# Patient Record
Sex: MALE | Race: WHITE | Employment: UNEMPLOYED | URBAN - METROPOLITAN AREA
[De-identification: names, ages, dates, MRNs, and addresses within clinical notes are randomized per-mention and may not be internally consistent; named-entity substitution may affect disease eponyms.]

---

## 2017-01-10 RX ORDER — LOSARTAN POTASSIUM 100 MG/1
TABLET ORAL
Qty: 30 TAB | Refills: 11 | Status: SHIPPED | OUTPATIENT
Start: 2017-01-10 | End: 2018-01-02 | Stop reason: SDUPTHER

## 2017-01-18 ENCOUNTER — OFFICE VISIT (OUTPATIENT)
Dept: FAMILY MEDICINE CLINIC | Age: 62
End: 2017-01-18

## 2017-01-18 VITALS
OXYGEN SATURATION: 98 % | HEART RATE: 60 BPM | BODY MASS INDEX: 22.5 KG/M2 | HEIGHT: 66 IN | SYSTOLIC BLOOD PRESSURE: 147 MMHG | WEIGHT: 140 LBS | DIASTOLIC BLOOD PRESSURE: 74 MMHG | TEMPERATURE: 47.1 F

## 2017-01-18 DIAGNOSIS — G44.219 EPISODIC TENSION-TYPE HEADACHE, NOT INTRACTABLE: ICD-10-CM

## 2017-01-18 DIAGNOSIS — M17.0 OSTEOARTHRITIS OF BOTH KNEES, UNSPECIFIED OSTEOARTHRITIS TYPE: Primary | ICD-10-CM

## 2017-01-18 RX ORDER — IBUPROFEN 800 MG/1
800 TABLET ORAL
Qty: 120 TAB | Refills: 0 | Status: SHIPPED | OUTPATIENT
Start: 2017-01-18 | End: 2017-06-28 | Stop reason: SDUPTHER

## 2017-01-18 NOTE — PROGRESS NOTES
I saw and evaluated the patient, performing the key elements of the service. I discussed the findings, assessment and plan with the resident and agree with the resident's findings and plan as documented in the resident's note.

## 2017-01-18 NOTE — PROGRESS NOTES
Yuri Hernandez is an 62 y.o. male  presents for the headache and osteoarthritis. Headache  The pain started 2 weeks ago, comes and goes, mostly on the right side of the head. No photosensitivity, no sensitivity to loud noises, nausea, vomiting, fever. Tried OTC ibuprofen 200mg  with very slight relief. The patient thinks that the pain can be attributed to the loud noises from the neighbors. The young neighbor is lestening to the music very loudly. Chronic osteoarthritis of both knees  Has episodes of the pain in the knees bilaterally, mostly during the cold weather period. This is a chronic problem. He tried Ibuprofen 800 mg a long time ago which significantly helped him. Wants to try it again. Allergies - reviewed: Allergies   Allergen Reactions    Bupropion Other (comments)     Urinary frequency and flatulence.  Chantix [Varenicline] Other (comments)     Trouble sleeping, soreness of mouth    Codeine Itching    Fluticasone Shortness of Breath and Swelling     possibly due to    Nasonex [Mometasone] Other (comments)     swelling of throat and hands possibly due to         Medications - reviewed:   Current Outpatient Prescriptions   Medication Sig    ibuprofen (MOTRIN) 800 mg tablet Take 1 Tab by mouth every six (6) hours as needed for Pain. Indications: HEADACHE DISORDER, PAIN    levothyroxine (SYNTHROID) 100 mcg tablet Take 1 Tab by mouth Daily (before breakfast).  losartan (COZAAR) 100 mg tablet TAKE ONE TABLET BY MOUTH DAILY    PULMICORT FLEXHALER 180 mcg/actuation aepb inhaler INHALE TWO PUFFS BY MOUTH TWICE A DAY    carvedilol (COREG) 3.125 mg tablet TAKE ONE TABLET BY MOUTH TWO TIMES A DAY WITH MEALS    oxyCODONE-acetaminophen (PERCOCET) 5-325 mg per tablet Take 1 Tab by mouth every four (4) hours as needed for Pain. Max Daily Amount: 6 Tabs.     PROAIR HFA 90 mcg/actuation inhaler INHALE TWO PUFFS BY MOUTH EVERY 4 HOURS AS NEEDED FOR WHEEZING    TUDORZA PRESSAIR 400 mcg/actuation inhaler INHALE ONE DOSE BY MOUTH TWICE A DAY    traZODone (DESYREL) 50 mg tablet TAKE ONE TABLET BY MOUTH EVERY NIGHT AT BEDTIME AS NEEDED FOR SLEEP    pravastatin (PRAVACHOL) 40 mg tablet TAKE ONE TABLET BY MOUTH DAILY    cimetidine (TAGAMET) 400 mg tablet Take 200 mg by mouth two (2) times a day.  loratadine (CLARITIN) 10 mg tablet Take 1 tablet by mouth daily.  aspirin delayed-release 81 mg tablet Take 1 Tab by mouth daily.  nitroglycerin (NITROSTAT) 0.4 mg SL tablet 1 Tab by SubLINGual route every five (5) minutes as needed for Chest Pain. No current facility-administered medications for this visit. Past Medical History - reviewed:  Past Medical History   Diagnosis Date    Acute myocardial infarction, unspecified site, episode of care unspecified 2/22/2013    CAD (coronary artery disease)     Chest pain 10/15/2014    COPD (chronic obstructive pulmonary disease) (HCC)     Coronary atherosclerosis of native coronary artery 2/22/2013    Exercise induced bronchospasm 2/22/2013    Hypertension     Hyperthyroidism     Old myocardial infarction 2/22/2013    Tobacco use 5/5/2012         Past Surgical History - reviewed:   Past Surgical History   Procedure Laterality Date    Pr cardiac surg procedure unlist       stents may 2012     Hx cataract removal Left 02/02/2016         Social History - reviewed:  Social History     Social History    Marital status:      Spouse name: N/A    Number of children: N/A    Years of education: N/A     Occupational History    Not on file. Social History Main Topics    Smoking status: Current Every Day Smoker     Packs/day: 1.00     Years: 38.00     Types: Cigarettes    Smokeless tobacco: Never Used      Comment: Tried Chantix and had side effects    Alcohol use No    Drug use: Yes     Special: Marijuana      Comment: OCC.     Sexual activity: Not Currently     Other Topics Concern    Not on file     Social History Narrative         Family History - reviewed:  Family History   Problem Relation Age of Onset    Cancer Mother      lung cancer    Heart Disease Father     Thyroid Disease Brother     Thyroid Disease Maternal Aunt          Immunizations - reviewed:   Immunization History   Administered Date(s) Administered    Influenza Vaccine (Quad) PF 11/16/2015, 10/19/2016    Influenza Vaccine PF 10/28/2014    Pneumococcal Polysaccharide (PPSV-23) 10/12/2015         ROS  Review of Systems : negative unless highlighted  CONSTITUTIONAL: Headaches. fevers. Chills. Anorexia. Weight loss. Weight gain. EYES: diplopia. Blurry vision. Visual changes  ENT: sinus congestion. Rhinorrhea. CARDIOVASCULAR: chest pain. palpitations  RESPIRATORY: dyspnea. Cough. Wheeze. NEURO: numbness. Tingling. Weakness. MUSCULOSKELETAL: arthralgias. Myalgias. Edema. SKIN: rash. Itching. Dryness. Flushing. Physical Exam  Visit Vitals    /74 (BP 1 Location: Left arm, BP Patient Position: Sitting)    Pulse 60    Temp (!) 47.1 °F (8.4 °C) (Oral)    Ht 5' 5.75\" (1.67 m)    Wt 140 lb (63.5 kg)    SpO2 98%    BMI 22.77 kg/m2       General appearance - NAD. Appropriately conversational  Neurological - No focal deficits. Speech normal.   Musculoskeletal - Decreased ROM in both knees, Gait normal.    Skin - normal coloration and normal turgor. No cyanosis, no rash. Assessment/Plan  Headache. Most likely tension like. -Will give the prescription for Ibuprofen 800 mg. If this one is not working will try another NSAID    Osteoarthritis  -Ibuprofen as above        Follow-up Disposition:  Return if symptoms worsen or fail to improve, for follow up . I discussed the aforementioned diagnoses with the patient as well as the plan of care.      Senait Thakkar MD  Family Medicine Resident  PGY 1

## 2017-01-18 NOTE — PATIENT INSTRUCTIONS
Headache: Care Instructions  Your Care Instructions    Headaches have many possible causes. Most headaches aren't a sign of a more serious problem, and they will get better on their own. Home treatment may help you feel better faster. The doctor has checked you carefully, but problems can develop later. If you notice any problems or new symptoms, get medical treatment right away. Follow-up care is a key part of your treatment and safety. Be sure to make and go to all appointments, and call your doctor if you are having problems. It's also a good idea to know your test results and keep a list of the medicines you take. How can you care for yourself at home? · Do not drive if you have taken a prescription pain medicine. · Rest in a quiet, dark room until your headache is gone. Close your eyes and try to relax or go to sleep. Don't watch TV or read. · Put a cold, moist cloth or cold pack on the painful area for 10 to 20 minutes at a time. Put a thin cloth between the cold pack and your skin. · Use a warm, moist towel or a heating pad set on low to relax tight shoulder and neck muscles. · Have someone gently massage your neck and shoulders. · Take pain medicines exactly as directed. ¨ If the doctor gave you a prescription medicine for pain, take it as prescribed. ¨ If you are not taking a prescription pain medicine, ask your doctor if you can take an over-the-counter medicine. · Be careful not to take pain medicine more often than the instructions allow, because you may get worse or more frequent headaches when the medicine wears off. · Do not ignore new symptoms that occur with a headache, such as a fever, weakness or numbness, vision changes, or confusion. These may be signs of a more serious problem. To prevent headaches  · Keep a headache diary so you can figure out what triggers your headaches. Avoiding triggers may help you prevent headaches.  Record when each headache began, how long it lasted, and what the pain was like (throbbing, aching, stabbing, or dull). Write down any other symptoms you had with the headache, such as nausea, flashing lights or dark spots, or sensitivity to bright light or loud noise. Note if the headache occurred near your period. List anything that might have triggered the headache, such as certain foods (chocolate, cheese, wine) or odors, smoke, bright light, stress, or lack of sleep. · Find healthy ways to deal with stress. Headaches are most common during or right after stressful times. Take time to relax before and after you do something that has caused a headache in the past.  · Try to keep your muscles relaxed by keeping good posture. Check your jaw, face, neck, and shoulder muscles for tension, and try relaxing them. When sitting at a desk, change positions often, and stretch for 30 seconds each hour. · Get plenty of sleep and exercise. · Eat regularly and well. Long periods without food can trigger a headache. · Treat yourself to a massage. Some people find that regular massages are very helpful in relieving tension. · Limit caffeine by not drinking too much coffee, tea, or soda. But don't quit caffeine suddenly, because that can also give you headaches. · Reduce eyestrain from computers by blinking frequently and looking away from the computer screen every so often. Make sure you have proper eyewear and that your monitor is set up properly, about an arm's length away. · Seek help if you have depression or anxiety. Your headaches may be linked to these conditions. Treatment can both prevent headaches and help with symptoms of anxiety or depression. When should you call for help? Call 911 anytime you think you may need emergency care. For example, call if:  · You have signs of a stroke. These may include:  ¨ Sudden numbness, paralysis, or weakness in your face, arm, or leg, especially on only one side of your body. ¨ Sudden vision changes.   ¨ Sudden trouble speaking. ¨ Sudden confusion or trouble understanding simple statements. ¨ Sudden problems with walking or balance. ¨ A sudden, severe headache that is different from past headaches. Call your doctor now or seek immediate medical care if:  · You have a new or worse headache. · Your headache gets much worse. Where can you learn more? Go to http://benjamin-irma.info/. Enter M271 in the search box to learn more about \"Headache: Care Instructions. \"  Current as of: February 19, 2016  Content Version: 11.1  © 1537-0535 [employer]. Care instructions adapted under license by [employer] (which disclaims liability or warranty for this information). If you have questions about a medical condition or this instruction, always ask your healthcare professional. [address] any warranty or liability for your use of this information. Knee Arthritis: Care Instructions  Your Care Instructions  Knee arthritis is a breakdown of the cartilage that cushions your knee joint. When the cartilage wears down, your bones rub against each other. This causes pain and stiffness. Knee arthritis tends to get worse with time. Treatment for knee arthritis involves reducing pain, making the leg muscles stronger, and staying at a healthy body weight. The treatment usually does not improve the health of the cartilage, but it can reduce pain and improve how well your knee works. You can take simple measures to protect your knee joints, ease your pain, and help you stay active. Follow-up care is a key part of your treatment and safety. Be sure to make and go to all appointments, and call your doctor if you are having problems. It's also a good idea to know your test results and keep a list of the medicines you take. How can you care for yourself at home? · Know that knee arthritis will cause more pain on some days than on others. · Stay at a healthy weight.  Lose weight if you are overweight. When you stand up, the pressure on your knees from every pound of body weight is multiplied four times. So if you lose 10 pounds, you will reduce the pressure on your knees by 40 pounds. · Talk to your doctor or physical therapist about exercises that will help ease joint pain. ¨ Stretch to help prevent stiffness and to prevent injury before you exercise. You may enjoy gentle forms of yoga to help keep your knee joints and muscles flexible. ¨ Walk instead of jog. ¨ Ride a bike. This makes your thigh muscles stronger and takes pressure off your knee. ¨ Wear well-fitting and comfortable shoes. ¨ Exercise in chest-deep water. This can help you exercise longer with less pain. ¨ Avoid exercises that include squatting or kneeling. They can put a lot of strain on your knees. ¨ Talk to your doctor to make sure that the exercise you do is not making the arthritis worse. · Do not sit for long periods of time. Try to walk once in a while to keep your knee from getting stiff. · Ask your doctor or physical therapist whether shoe inserts may reduce your arthritis pain. · If you can afford it, get new athletic shoes at least every year. This can help reduce the strain on your knees. · Use a device to help you do everyday activities. ¨ A cane or walking stick can help you keep your balance when you walk. Hold the cane or walking stick in the hand opposite the painful knee. ¨ If you feel like you may fall when you walk, try using crutches or a front-wheeled walker. These can prevent falls that could cause more damage to your knee. ¨ A knee brace may help keep your knee stable and prevent pain. ¨ You also can use other things to make life easier, such as a higher toilet seat and handrails in the bathtub or shower. · Take pain medicines exactly as directed. ¨ Do not wait until you are in severe pain. You will get better results if you take it sooner.   ¨ If you are not taking a prescription pain medicine, take an over-the-counter medicine such as acetaminophen (Tylenol), ibuprofen (Advil, Motrin), or naproxen (Aleve). Read and follow all instructions on the label. ¨ Do not take two or more pain medicines at the same time unless the doctor told you to. Many pain medicines have acetaminophen, which is Tylenol. Too much acetaminophen (Tylenol) can be harmful. ¨ Tell your doctor if you take a blood thinner, have diabetes, or have allergies to shellfish. · Ask your doctor if you might benefit from a shot of steroid medicine into your knee. This may provide pain relief for several months. · Many people take the supplements glucosamine and chondroitin for osteoarthritis. Some people feel they help, but the medical research does not show that they work. Talk to your doctor before you take these supplements. When should you call for help? Call your doctor now or seek immediate medical care if:  · You have sudden swelling, warmth, or pain in your knee. · You have knee pain and a fever or rash. · You have such bad pain that you cannot use your knee. Watch closely for changes in your health, and be sure to contact your doctor if you have any problems. Where can you learn more? Go to http://benjamin-irma.info/. Enter H619 in the search box to learn more about \"Knee Arthritis: Care Instructions. \"  Current as of: February 24, 2016  Content Version: 11.1  © 6941-6238 [employer]. Care instructions adapted under license by [employer] (which disclaims liability or warranty for this information). If you have questions about a medical condition or this instruction, always ask your healthcare professional. [place] [address] any warranty or liability for your use of this information.

## 2017-01-18 NOTE — MR AVS SNAPSHOT
Visit Information Date & Time Provider Department Dept. Phone Encounter #  
 1/18/2017 10:10 AM Kyle Bartholomew MD [facility] [phone] [id number] Follow-up Instructions Return if symptoms worsen or fail to improve, for follow up . Upcoming Health Maintenance Date Due Hepatitis C Screening 1955 DTaP/Tdap/Td series (1 - Tdap) 9/16/1977 FOBT Q 1 YEAR AGE 50-75 9/16/2006 ZOSTER VACCINE AGE 60> 9/16/2016 Allergies as of 1/18/2017  Review Complete On: 1/18/2017 By: Leita Fleischer, MD  
  
 Severity Noted Reaction Type Reactions Bupropion  12/28/2015    Other (comments) Urinary frequency and flatulence. Chantix [Varenicline]  03/03/2014    Other (comments) Trouble sleeping, soreness of mouth Codeine  05/05/2012    Itching Fluticasone  10/28/2014    Shortness of Breath, Swelling  
 possibly due to Nasonex [Mometasone]  12/29/2014   Side Effect Other (comments)  
 swelling of throat and hands possibly due to Current Immunizations  Reviewed on 1/18/2017 Name Date Influenza Vaccine (Quad) PF 10/19/2016, 11/16/2015 Influenza Vaccine PF 10/28/2014 Influenza Vaccine Split  Deferred (Patient Refused) Pneumococcal Polysaccharide (PPSV-23) 10/12/2015 Reviewed by Leita Fleischer, MD on 1/18/2017 at 10:14 AM  
You Were Diagnosed With   
  
 Codes Comments Osteoarthritis of both knees, unspecified osteoarthritis type    -  Primary ICD-10-CM: M17.0 ICD-9-CM: 715.96 Episodic tension-type headache, not intractable     ICD-10-CM: E65.935 ICD-9-CM: 339.11 Vitals BP Pulse Temp Height(growth percentile) Weight(growth percentile) SpO2  
 147/74 (BP 1 Location: Left arm, BP Patient Position: Sitting) 60 (!) 47.1 °F (8.4 °C) (Oral) 5' 5.75\" (1.67 m) 140 lb (63.5 kg) 98% BMI Smoking Status 22.77 kg/m2 Current Every Day Smoker Vitals History BMI and BSA Data Body Mass Index Body Surface Area  
 22.77 kg/m 2 1.72 m 2 Preferred Pharmacy Pharmacy Name Phone [facility] [address], [address] [facility] [phone] Your Updated Medication List  
  
   
This list is accurate as of: 1/18/17 10:47 AM.  Always use your most recent med list.  
  
  
  
  
 aspirin delayed-release 81 mg tablet Take 1 Tab by mouth daily. carvedilol 3.125 mg tablet Commonly known as:  COREG  
TAKE ONE TABLET BY MOUTH TWO TIMES A DAY WITH MEALS  
  
 cimetidine 400 mg tablet Commonly known as:  TAGAMET Take 200 mg by mouth two (2) times a day. ibuprofen 800 mg tablet Commonly known as:  MOTRIN Take 1 Tab by mouth every six (6) hours as needed for Pain. Indications: HEADACHE DISORDER, PAIN  
  
 levothyroxine 100 mcg tablet Commonly known as:  SYNTHROID Take 1 Tab by mouth Daily (before breakfast). loratadine 10 mg tablet Commonly known as:  Nav Amy Take 1 tablet by mouth daily. losartan 100 mg tablet Commonly known as:  COZAAR  
TAKE ONE TABLET BY MOUTH DAILY  
  
 nitroglycerin 0.4 mg SL tablet Commonly known as:  NITROSTAT  
1 Tab by SubLINGual route every five (5) minutes as needed for Chest Pain. oxyCODONE-acetaminophen 5-325 mg per tablet Commonly known as:  PERCOCET Take 1 Tab by mouth every four (4) hours as needed for Pain. Max Daily Amount: 6 Tabs. pravastatin 40 mg tablet Commonly known as:  PRAVACHOL  
TAKE ONE TABLET BY MOUTH DAILY PROAIR HFA 90 mcg/actuation inhaler Generic drug:  albuterol INHALE TWO PUFFS BY MOUTH EVERY 4 HOURS AS NEEDED FOR WHEEZING PULMICORT FLEXHALER 180 mcg/actuation Aepb inhaler Generic drug:  budesonide INHALE TWO PUFFS BY MOUTH TWICE A DAY  
  
 traZODone 50 mg tablet Commonly known as:  DESYREL  
TAKE ONE TABLET BY MOUTH EVERY NIGHT AT BEDTIME AS NEEDED FOR SLEEP  
  
 TUDORZA PRESSAIR 400 mcg/actuation inhaler Generic drug:  aclidinium bromide INHALE ONE DOSE BY MOUTH TWICE A DAY Prescriptions Sent to Pharmacy Refills  
 ibuprofen (MOTRIN) 800 mg tablet 0 Sig: Take 1 Tab by mouth every six (6) hours as needed for Pain. Indications: HEADACHE DISORDER, PAIN Class: Normal  
 Pharmacy: [address] [facility] #: [phone] Route: Oral  
  
Follow-up Instructions Return if symptoms worsen or fail to improve, for follow up . Patient Instructions Headache: Care Instructions Your Care Instructions Headaches have many possible causes. Most headaches aren't a sign of a more serious problem, and they will get better on their own. Home treatment may help you feel better faster. The doctor has checked you carefully, but problems can develop later. If you notice any problems or new symptoms, get medical treatment right away. Follow-up care is a key part of your treatment and safety. Be sure to make and go to all appointments, and call your doctor if you are having problems. It's also a good idea to know your test results and keep a list of the medicines you take. How can you care for yourself at home? · Do not drive if you have taken a prescription pain medicine. · Rest in a quiet, dark room until your headache is gone. Close your eyes and try to relax or go to sleep. Don't watch TV or read. · Put a cold, moist cloth or cold pack on the painful area for 10 to 20 minutes at a time. Put a thin cloth between the cold pack and your skin. · Use a warm, moist towel or a heating pad set on low to relax tight shoulder and neck muscles. · Have someone gently massage your neck and shoulders. · Take pain medicines exactly as directed. ¨ If the doctor gave you a prescription medicine for pain, take it as prescribed. ¨ If you are not taking a prescription pain medicine, ask your doctor if you can take an over-the-counter medicine. · Be careful not to take pain medicine more often than the instructions allow, because you may get worse or more frequent headaches when the medicine wears off. · Do not ignore new symptoms that occur with a headache, such as a fever, weakness or numbness, vision changes, or confusion. These may be signs of a more serious problem. To prevent headaches · Keep a headache diary so you can figure out what triggers your headaches. Avoiding triggers may help you prevent headaches. Record when each headache began, how long it lasted, and what the pain was like (throbbing, aching, stabbing, or dull). Write down any other symptoms you had with the headache, such as nausea, flashing lights or dark spots, or sensitivity to bright light or loud noise. Note if the headache occurred near your period. List anything that might have triggered the headache, such as certain foods (chocolate, cheese, wine) or odors, smoke, bright light, stress, or lack of sleep. · Find healthy ways to deal with stress. Headaches are most common during or right after stressful times. Take time to relax before and after you do something that has caused a headache in the past. 
· Try to keep your muscles relaxed by keeping good posture. Check your jaw, face, neck, and shoulder muscles for tension, and try relaxing them. When sitting at a desk, change positions often, and stretch for 30 seconds each hour. · Get plenty of sleep and exercise. · Eat regularly and well. Long periods without food can trigger a headache. · Treat yourself to a massage. Some people find that regular massages are very helpful in relieving tension. · Limit caffeine by not drinking too much coffee, tea, or soda. But don't quit caffeine suddenly, because that can also give you headaches. · Reduce eyestrain from computers by blinking frequently and looking away from the computer screen every so often.  Make sure you have proper eyewear and that your monitor is set up properly, about an arm's length away. · Seek help if you have depression or anxiety. Your headaches may be linked to these conditions. Treatment can both prevent headaches and help with symptoms of anxiety or depression. When should you call for help? Call 911 anytime you think you may need emergency care. For example, call if: 
· You have signs of a stroke. These may include: 
¨ Sudden numbness, paralysis, or weakness in your face, arm, or leg, especially on only one side of your body. ¨ Sudden vision changes. ¨ Sudden trouble speaking. ¨ Sudden confusion or trouble understanding simple statements. ¨ Sudden problems with walking or balance. ¨ A sudden, severe headache that is different from past headaches. Call your doctor now or seek immediate medical care if: 
· You have a new or worse headache. · Your headache gets much worse. Where can you learn more? Go to http://benjaminClauseMatchirma.info/. Enter M271 in the search box to learn more about \"Headache: Care Instructions. \" Current as of: February 19, 2016 Content Version: 11.1 © 7002-5276 [employer]. Care instructions adapted under license by [employer] (which disclaims liability or warranty for this information). If you have questions about a medical condition or this instruction, always ask your healthcare professional. [place] [address] any warranty or liability for your use of this information. Knee Arthritis: Care Instructions Your Care Instructions Knee arthritis is a breakdown of the cartilage that cushions your knee joint. When the cartilage wears down, your bones rub against each other. This causes pain and stiffness. Knee arthritis tends to get worse with time. Treatment for knee arthritis involves reducing pain, making the leg muscles stronger, and staying at a healthy body weight.  The treatment usually does not improve the health of the cartilage, but it can reduce pain and improve how well your knee works. You can take simple measures to protect your knee joints, ease your pain, and help you stay active. Follow-up care is a key part of your treatment and safety. Be sure to make and go to all appointments, and call your doctor if you are having problems. It's also a good idea to know your test results and keep a list of the medicines you take. How can you care for yourself at home? · Know that knee arthritis will cause more pain on some days than on others. · Stay at a healthy weight. Lose weight if you are overweight. When you stand up, the pressure on your knees from every pound of body weight is multiplied four times. So if you lose 10 pounds, you will reduce the pressure on your knees by 40 pounds. · Talk to your doctor or physical therapist about exercises that will help ease joint pain. ¨ Stretch to help prevent stiffness and to prevent injury before you exercise. You may enjoy gentle forms of yoga to help keep your knee joints and muscles flexible. ¨ Walk instead of jog. ¨ Ride a bike. This makes your thigh muscles stronger and takes pressure off your knee. ¨ Wear well-fitting and comfortable shoes. ¨ Exercise in chest-deep water. This can help you exercise longer with less pain. ¨ Avoid exercises that include squatting or kneeling. They can put a lot of strain on your knees. ¨ Talk to your doctor to make sure that the exercise you do is not making the arthritis worse. · Do not sit for long periods of time. Try to walk once in a while to keep your knee from getting stiff. · Ask your doctor or physical therapist whether shoe inserts may reduce your arthritis pain. · If you can afford it, get new athletic shoes at least every year. This can help reduce the strain on your knees. · Use a device to help you do everyday activities. ¨ A cane or walking stick can help you keep your balance when you walk. Hold the cane or walking stick in the hand opposite the painful knee. ¨ If you feel like you may fall when you walk, try using crutches or a front-wheeled walker. These can prevent falls that could cause more damage to your knee. ¨ A knee brace may help keep your knee stable and prevent pain. ¨ You also can use other things to make life easier, such as a higher toilet seat and handrails in the bathtub or shower. · Take pain medicines exactly as directed. ¨ Do not wait until you are in severe pain. You will get better results if you take it sooner. ¨ If you are not taking a prescription pain medicine, take an over-the-counter medicine such as acetaminophen (Tylenol), ibuprofen (Advil, Motrin), or naproxen (Aleve). Read and follow all instructions on the label. ¨ Do not take two or more pain medicines at the same time unless the doctor told you to. Many pain medicines have acetaminophen, which is Tylenol. Too much acetaminophen (Tylenol) can be harmful. ¨ Tell your doctor if you take a blood thinner, have diabetes, or have allergies to shellfish. · Ask your doctor if you might benefit from a shot of steroid medicine into your knee. This may provide pain relief for several months. · Many people take the supplements glucosamine and chondroitin for osteoarthritis. Some people feel they help, but the medical research does not show that they work. Talk to your doctor before you take these supplements. When should you call for help? Call your doctor now or seek immediate medical care if: 
· You have sudden swelling, warmth, or pain in your knee. · You have knee pain and a fever or rash. · You have such bad pain that you cannot use your knee. Watch closely for changes in your health, and be sure to contact your doctor if you have any problems. Where can you learn more? Go to http://benjamin-irma.info/. Enter E168 in the search box to learn more about \"Knee Arthritis: Care Instructions. \" Current as of: February 24, 2016 Content Version: 11.1 © 7026-8580 [employer]. Care instructions adapted under license by [employer] (which disclaims liability or warranty for this information). If you have questions about a medical condition or this instruction, always ask your healthcare professional. [address] any warranty or liability for your use of this information. Introducing [facility]! [employer] introduces Gotcha Ninjas patient portal. Now you can access parts of your medical record, email your doctor's office, and request medication refills online. 1. In your internet browser, go to [email or web address] 2. Click on the First Time User? Click Here link in the Sign In box. You will see the New Member Sign Up page. 3. Enter your Gotcha Ninjas Access Code exactly as it appears below. You will not need to use this code after youve completed the sign-up process. If you do not sign up before the expiration date, you must request a new code. · Got[employer] Ninjas Access Code: [email or web address] Expires: 4/18/2017 10:43 AM 
 
4. Enter the last four digits of your Social Security Number (xxxx) and Date of Birth (mm/dd/yyyy) as indicated and click Submit. You will be taken to the next sign-up page. 5. Create a Gotcha Ninjas ID. This will be your Gotcha Ninjas login ID and cannot be changed, so think of one that is secure and easy to remember. 6. Create a Gotcha Ninjas password. You can change your password at any time. 7. Enter your Password Reset Question and Answer. This can be used at a later time if you forget your password. 8. Enter your e-mail address. You will receive e-mail notification when new information is available in [address] 9. Click Sign Up. You can now view and download portions of your medical record. 10. Click the Download Summary menu link to download a portable copy of your medical information. If you have questions, please visit the Frequently Asked Questions section of the [employer] website. Remember, [employer] is NOT to be used for urgent needs. For medical emergencies, dial 911. Now available from your iPhone and Android! Please provide this summary of care documentation to your next provider. Your primary care clinician is listed as Soledad Brenner. If you have any questions after today's visit, please call [phone].

## 2017-01-18 NOTE — PROGRESS NOTES
Coordination of Care  1. Have you been to the ER, urgent care clinic since your last visit? Hospitalized since your last visit? No    2. Have you seen or consulted any other health care providers outside of the [address] [place] [address] since your last visit? Include any pap smears or colon screening. No    Medications  Medication Reconciliation Performed: no  Patient does not need refills     Learning Assessment Complete?  yes

## 2017-03-08 RX ORDER — PRAVASTATIN SODIUM 40 MG/1
TABLET ORAL
Qty: 30 TAB | Refills: 10 | Status: SHIPPED | OUTPATIENT
Start: 2017-03-08 | End: 2018-01-29 | Stop reason: SDUPTHER

## 2017-03-19 ENCOUNTER — HOSPITAL ENCOUNTER (EMERGENCY)
Age: 62
Discharge: HOME | End: 2017-03-20
Attending: EMERGENCY MEDICINE
Payer: MEDICAID

## 2017-03-19 ENCOUNTER — APPOINTMENT (OUTPATIENT)
Dept: GENERAL RADIOLOGY | Age: 62
End: 2017-03-19
Attending: EMERGENCY MEDICINE
Payer: MEDICAID

## 2017-03-19 DIAGNOSIS — J44.9 CHRONIC OBSTRUCTIVE PULMONARY DISEASE, UNSPECIFIED COPD TYPE (HCC): Primary | ICD-10-CM

## 2017-03-19 LAB
ALBUMIN SERPL BCP-MCNC: 3.6 G/DL (ref 3.5–5)
ALBUMIN/GLOB SERPL: 0.9 {RATIO} (ref 1.1–2.2)
ALP SERPL-CCNC: 83 U/L (ref 45–117)
ALT SERPL-CCNC: 14 U/L (ref 12–78)
ANION GAP BLD CALC-SCNC: 7 MMOL/L (ref 5–15)
AST SERPL W P-5'-P-CCNC: 14 U/L (ref 15–37)
BASOPHILS # BLD AUTO: 0 K/UL (ref 0–0.1)
BASOPHILS # BLD: 0 % (ref 0–1)
BILIRUB SERPL-MCNC: 0.4 MG/DL (ref 0.2–1)
BUN SERPL-MCNC: 7 MG/DL (ref 6–20)
BUN/CREAT SERPL: 8 (ref 12–20)
CALCIUM SERPL-MCNC: 8.9 MG/DL (ref 8.5–10.1)
CHLORIDE SERPL-SCNC: 98 MMOL/L (ref 97–108)
CK SERPL-CCNC: 84 U/L (ref 39–308)
CO2 SERPL-SCNC: 30 MMOL/L (ref 21–32)
CREAT SERPL-MCNC: 0.88 MG/DL (ref 0.7–1.3)
EOSINOPHIL # BLD: 0.2 K/UL (ref 0–0.4)
EOSINOPHIL NFR BLD: 2 % (ref 0–7)
ERYTHROCYTE [DISTWIDTH] IN BLOOD BY AUTOMATED COUNT: 13.3 % (ref 11.5–14.5)
FLUAV AG NPH QL IA: NEGATIVE
FLUBV AG NOSE QL IA: NEGATIVE
GLOBULIN SER CALC-MCNC: 3.8 G/DL (ref 2–4)
GLUCOSE SERPL-MCNC: 95 MG/DL (ref 65–100)
HCT VFR BLD AUTO: 46.9 % (ref 36.6–50.3)
HGB BLD-MCNC: 15.6 G/DL (ref 12.1–17)
LYMPHOCYTES # BLD AUTO: 28 % (ref 12–49)
LYMPHOCYTES # BLD: 2 K/UL (ref 0.8–3.5)
MCH RBC QN AUTO: 31.7 PG (ref 26–34)
MCHC RBC AUTO-ENTMCNC: 33.3 G/DL (ref 30–36.5)
MCV RBC AUTO: 95.3 FL (ref 80–99)
MONOCYTES # BLD: 1.4 K/UL (ref 0–1)
MONOCYTES NFR BLD AUTO: 19 % (ref 5–13)
NEUTS SEG # BLD: 3.7 K/UL (ref 1.8–8)
NEUTS SEG NFR BLD AUTO: 51 % (ref 32–75)
PLATELET # BLD AUTO: 164 K/UL (ref 150–400)
POTASSIUM SERPL-SCNC: 4.5 MMOL/L (ref 3.5–5.1)
PROT SERPL-MCNC: 7.4 G/DL (ref 6.4–8.2)
RBC # BLD AUTO: 4.92 M/UL (ref 4.1–5.7)
SODIUM SERPL-SCNC: 135 MMOL/L (ref 136–145)
TROPONIN I SERPL-MCNC: <0.04 NG/ML
WBC # BLD AUTO: 7.3 K/UL (ref 4.1–11.1)

## 2017-03-19 PROCEDURE — 74011636637 HC RX REV CODE- 636/637: Performed by: EMERGENCY MEDICINE

## 2017-03-19 PROCEDURE — 84484 ASSAY OF TROPONIN QUANT: CPT | Performed by: EMERGENCY MEDICINE

## 2017-03-19 PROCEDURE — 80053 COMPREHEN METABOLIC PANEL: CPT | Performed by: EMERGENCY MEDICINE

## 2017-03-19 PROCEDURE — 36415 COLL VENOUS BLD VENIPUNCTURE: CPT | Performed by: EMERGENCY MEDICINE

## 2017-03-19 PROCEDURE — 85025 COMPLETE CBC W/AUTO DIFF WBC: CPT | Performed by: EMERGENCY MEDICINE

## 2017-03-19 PROCEDURE — 94640 AIRWAY INHALATION TREATMENT: CPT

## 2017-03-19 PROCEDURE — 82550 ASSAY OF CK (CPK): CPT | Performed by: EMERGENCY MEDICINE

## 2017-03-19 PROCEDURE — 71020 XR CHEST PA LAT: CPT

## 2017-03-19 PROCEDURE — 74011250637 HC RX REV CODE- 250/637: Performed by: EMERGENCY MEDICINE

## 2017-03-19 PROCEDURE — 96360 HYDRATION IV INFUSION INIT: CPT

## 2017-03-19 PROCEDURE — 74011250636 HC RX REV CODE- 250/636: Performed by: EMERGENCY MEDICINE

## 2017-03-19 PROCEDURE — 74011000250 HC RX REV CODE- 250: Performed by: EMERGENCY MEDICINE

## 2017-03-19 PROCEDURE — 93005 ELECTROCARDIOGRAM TRACING: CPT

## 2017-03-19 PROCEDURE — 87804 INFLUENZA ASSAY W/OPTIC: CPT

## 2017-03-19 PROCEDURE — 99285 EMERGENCY DEPT VISIT HI MDM: CPT

## 2017-03-19 PROCEDURE — 77030013140 HC MSK NEB VYRM -A

## 2017-03-19 RX ORDER — PREDNISONE 20 MG/1
60 TABLET ORAL
Status: COMPLETED | OUTPATIENT
Start: 2017-03-19 | End: 2017-03-19

## 2017-03-19 RX ORDER — LEVOFLOXACIN 750 MG/1
750 TABLET ORAL ONCE
Status: COMPLETED | OUTPATIENT
Start: 2017-03-19 | End: 2017-03-19

## 2017-03-19 RX ORDER — IPRATROPIUM BROMIDE AND ALBUTEROL SULFATE 2.5; .5 MG/3ML; MG/3ML
3 SOLUTION RESPIRATORY (INHALATION)
Status: COMPLETED | OUTPATIENT
Start: 2017-03-19 | End: 2017-03-19

## 2017-03-19 RX ORDER — ASPIRIN 325 MG
325 TABLET ORAL
Status: COMPLETED | OUTPATIENT
Start: 2017-03-19 | End: 2017-03-19

## 2017-03-19 RX ADMIN — PREDNISONE 60 MG: 20 TABLET ORAL at 23:12

## 2017-03-19 RX ADMIN — IPRATROPIUM BROMIDE AND ALBUTEROL SULFATE 3 ML: .5; 3 SOLUTION RESPIRATORY (INHALATION) at 23:12

## 2017-03-19 RX ADMIN — IPRATROPIUM BROMIDE AND ALBUTEROL SULFATE 3 ML: .5; 3 SOLUTION RESPIRATORY (INHALATION) at 23:50

## 2017-03-19 RX ADMIN — LEVOFLOXACIN 750 MG: 750 TABLET, FILM COATED ORAL at 23:12

## 2017-03-19 RX ADMIN — ASPIRIN 325 MG ORAL TABLET 325 MG: 325 PILL ORAL at 23:12

## 2017-03-19 RX ADMIN — SODIUM CHLORIDE 500 ML: 900 INJECTION, SOLUTION INTRAVENOUS at 23:12

## 2017-03-19 RX ADMIN — IPRATROPIUM BROMIDE AND ALBUTEROL SULFATE 3 ML: .5; 3 SOLUTION RESPIRATORY (INHALATION) at 23:29

## 2017-03-20 VITALS
TEMPERATURE: 98.8 F | OXYGEN SATURATION: 90 % | HEART RATE: 87 BPM | WEIGHT: 139.77 LBS | HEIGHT: 67 IN | SYSTOLIC BLOOD PRESSURE: 112 MMHG | BODY MASS INDEX: 21.94 KG/M2 | DIASTOLIC BLOOD PRESSURE: 67 MMHG | RESPIRATION RATE: 20 BRPM

## 2017-03-20 LAB
ATRIAL RATE: 82 BPM
ATRIAL RATE: 94 BPM
CALCULATED P AXIS, ECG09: 53 DEGREES
CALCULATED P AXIS, ECG09: 61 DEGREES
CALCULATED R AXIS, ECG10: 39 DEGREES
CALCULATED R AXIS, ECG10: 76 DEGREES
CALCULATED T AXIS, ECG11: 37 DEGREES
CALCULATED T AXIS, ECG11: 63 DEGREES
CK MB CFR SERPL CALC: 1.7 % (ref 0–2.5)
CK MB SERPL-MCNC: 1.4 NG/ML (ref 5–25)
CK SERPL-CCNC: 82 U/L (ref 39–308)
DIAGNOSIS, 93000: NORMAL
DIAGNOSIS, 93000: NORMAL
P-R INTERVAL, ECG05: 116 MS
P-R INTERVAL, ECG05: 136 MS
Q-T INTERVAL, ECG07: 358 MS
Q-T INTERVAL, ECG07: 384 MS
QRS DURATION, ECG06: 88 MS
QRS DURATION, ECG06: 94 MS
QTC CALCULATION (BEZET), ECG08: 447 MS
QTC CALCULATION (BEZET), ECG08: 448 MS
TROPONIN I SERPL-MCNC: <0.04 NG/ML
VENTRICULAR RATE, ECG03: 82 BPM
VENTRICULAR RATE, ECG03: 94 BPM

## 2017-03-20 PROCEDURE — 82550 ASSAY OF CK (CPK): CPT | Performed by: EMERGENCY MEDICINE

## 2017-03-20 PROCEDURE — 84484 ASSAY OF TROPONIN QUANT: CPT | Performed by: EMERGENCY MEDICINE

## 2017-03-20 RX ORDER — NEBULIZER AND COMPRESSOR
1 EACH MISCELLANEOUS EVERY 4 HOURS
Qty: 1 EACH | Refills: 0 | Status: ON HOLD | OUTPATIENT
Start: 2017-03-20 | End: 2018-12-26

## 2017-03-20 RX ORDER — ALBUTEROL SULFATE 1.25 MG/3ML
1.25 SOLUTION RESPIRATORY (INHALATION)
Qty: 25 EACH | Refills: 0 | Status: SHIPPED | OUTPATIENT
Start: 2017-03-20 | End: 2018-05-31 | Stop reason: SDUPTHER

## 2017-03-20 RX ORDER — DOXYCYCLINE HYCLATE 100 MG
100 TABLET ORAL 2 TIMES DAILY
Qty: 20 TAB | Refills: 0 | Status: SHIPPED | OUTPATIENT
Start: 2017-03-20 | End: 2017-03-30

## 2017-03-20 RX ORDER — PREDNISONE 50 MG/1
50 TABLET ORAL DAILY
Qty: 5 TAB | Refills: 0 | Status: SHIPPED | OUTPATIENT
Start: 2017-03-20 | End: 2017-03-25

## 2017-03-20 NOTE — ED PROVIDER NOTES
HPI Comments: 63 yo M w CAD (5 stents last cath 2015), asthma presents with productive cough, wheeze and dyspnea for 2 days as well as muscle aches, vomiting and diarrhea without fever. He has had intermittent chest pain, atypical, a bilateral soreness lasting for several seconds at a time while or after coughing. He is very short of breath, more so than ever before, enough that he cannot use inhaler effectively. He has never been admitted to the hospital for COPD exacerbation in the past.    PMHx: Significant for CAD, COPD, HTN, MI, hyperthyroidism  PSHx: Significant for cardiac cath / stent  Social Hx: +tobacco (1 ppd), -EtOH, +Illicit Drugs (marijuana)    There are no other complaints, changes, or physical findings at this time. The history is provided by the patient. Past Medical History:   Diagnosis Date    Acute myocardial infarction, unspecified site, episode of care unspecified 2/22/2013    CAD (coronary artery disease)     Chest pain 10/15/2014    COPD (chronic obstructive pulmonary disease) (HCC)     Coronary atherosclerosis of native coronary artery 2/22/2013    Exercise induced bronchospasm 2/22/2013    Hypertension     Hyperthyroidism     Old myocardial infarction 2/22/2013    Tobacco use 5/5/2012       Past Surgical History:   Procedure Laterality Date    CARDIAC SURG PROCEDURE UNLIST      stents may 2012     HX CATARACT REMOVAL Left 02/02/2016         Family History:   Problem Relation Age of Onset    Cancer Mother      lung cancer    Heart Disease Father     Thyroid Disease Brother     Thyroid Disease Maternal Aunt        Social History     Social History    Marital status:      Spouse name: N/A    Number of children: N/A    Years of education: N/A     Occupational History    Not on file.      Social History Main Topics    Smoking status: Current Every Day Smoker     Packs/day: 1.00     Years: 38.00     Types: Cigarettes    Smokeless tobacco: Never Used Comment: Tried Chantix and had side effects    Alcohol use No    Drug use: Yes     Special: Marijuana      Comment: OCC.  Sexual activity: Not Currently     Other Topics Concern    Not on file     Social History Narrative         ALLERGIES: Bupropion; Chantix [varenicline]; Codeine; Fluticasone; and Nasonex [mometasone]    Review of Systems   Constitutional: Negative for chills and fever. Eyes: Negative for visual disturbance. Respiratory: Positive for cough, shortness of breath and wheezing. Cardiovascular: Positive for chest pain. Endocrine: Negative for polyuria. Genitourinary: Negative for dysuria. Skin: Negative for wound. Neurological: Negative for syncope. Psychiatric/Behavioral: Negative for suicidal ideas. All other systems reviewed and are negative. Vitals:    03/19/17 2330 03/20/17 0001 03/20/17 0030 03/20/17 0100   BP: 134/80 124/76 132/81 112/67   Pulse:       Resp:       Temp:       SpO2: 98% 96% 94% 90%   Weight:       Height:                Physical Exam   Constitutional: He is oriented to person, place, and time. He appears well-developed and well-nourished. HENT:   Head: Normocephalic and atraumatic. Cardiovascular: Normal rate, regular rhythm, normal heart sounds and intact distal pulses. Exam reveals no gallop. No murmur heard. Pulmonary/Chest: No respiratory distress. He has wheezes. He has no rales. Mild IWOB, tachypnea   Abdominal: Soft. He exhibits no distension. There is no tenderness. Musculoskeletal: He exhibits no edema. Neurological: He is alert and oriented to person, place, and time. Skin: Skin is warm and dry. Psychiatric: He has a normal mood and affect. Nursing note and vitals reviewed. MDM  Number of Diagnoses or Management Options  Chronic obstructive pulmonary disease, unspecified COPD type [facility]):   Diagnosis management comments: 63 yo M w s/s suggesting COPD exacerbation, w productive cough and wheeze.   Xray neg for pneumonia. Stable on room air. Will treat with steroids, nebs, abx per gold crit and reassess. Regarding CP - atypical, ekg no ischemic changes, trop neg x1. Presumed HEART 3 pending delta trop. Will load aspriin    Wells 0. Amount and/or Complexity of Data Reviewed  Clinical lab tests: ordered and reviewed  Tests in the radiology section of CPT®: ordered and reviewed  Review and summarize past medical records: yes  Independent visualization of images, tracings, or specimens: yes    Patient Progress  Patient progress: stable      Procedures   I reviewed our electronic medical record system for any past medical records that were available that may contribute to the patients current condition, the nursing notes and and vital signs from today's visit    Nursing notes will be reviewed as they become available in realtime while the pt is in the ED. April Tan MD    I discussed with the patient the medical risks of prolonged smoking habits and advised the patient of the benefits of the cessation of smoking. April Tan MD      EKG interpretation 1906 NSR, nl Axis, rate 82; , QRS 94, QTc 448; no acute ischemia; April Tan MD      PROGRESS NOTE:  Pt reevaluated. Pt states his sxs are improved at this time. Updated on all final lab and imaging findings. Pt agrees to follow up with cardiology as scheduled on 4/03/2017. Return precautions given. Vital signs stable for discharge. EKG interpretation 2355: NSR, nl Axis, rate 94; , QRS 88, QTc 447; no acute ischemia; April Tan MD    I personally reviewed pt's imaging. Official read by radiology listed below. April Tan MD    5:51 AM  Progress note:  Pt noted to be feeling better, ready for discharge. Discussed lab and imaging findings with pt and/or family. Will follow up as instructed. All questions have been answered, pt voiced understanding and agreement with plan.  If narcotics were prescribed, pt was advised not to drive or operate heavy machinery. If abx were prescribed, pt advised that diarrhea and rash are possible side effects of the medications. Specific return precautions provided as well as instructions to return to the ED should sx worsen at any time. LABORATORY TESTS:  Recent Results (from the past 12 hour(s))   CBC WITH AUTOMATED DIFF    Collection Time: 03/19/17  9:02 PM   Result Value Ref Range    WBC 7.3 4.1 - 11.1 K/uL    RBC 4.92 4.10 - 5.70 M/uL    HGB 15.6 12.1 - 17.0 g/dL    HCT 46.9 36.6 - 50.3 %    MCV 95.3 80.0 - 99.0 FL    MCH 31.7 26.0 - 34.0 PG    MCHC 33.3 30.0 - 36.5 g/dL    RDW 13.3 11.5 - 14.5 %    PLATELET 699 804 - 487 K/uL    NEUTROPHILS 51 32 - 75 %    LYMPHOCYTES 28 12 - 49 %    MONOCYTES 19 (H) 5 - 13 %    EOSINOPHILS 2 0 - 7 %    BASOPHILS 0 0 - 1 %    ABS. NEUTROPHILS 3.7 1.8 - 8.0 K/UL    ABS. LYMPHOCYTES 2.0 0.8 - 3.5 K/UL    ABS. MONOCYTES 1.4 (H) 0.0 - 1.0 K/UL    ABS. EOSINOPHILS 0.2 0.0 - 0.4 K/UL    ABS. BASOPHILS 0.0 0.0 - 0.1 K/UL   METABOLIC PANEL, COMPREHENSIVE    Collection Time: 03/19/17  9:02 PM   Result Value Ref Range    Sodium 135 (L) 136 - 145 mmol/L    Potassium 4.5 3.5 - 5.1 mmol/L    Chloride 98 97 - 108 mmol/L    CO2 30 21 - 32 mmol/L    Anion gap 7 5 - 15 mmol/L    Glucose 95 65 - 100 mg/dL    BUN 7 6 - 20 MG/DL    Creatinine 0.88 0.70 - 1.30 MG/DL    BUN/Creatinine ratio 8 (L) 12 - 20      GFR est AA >60 >60 ml/min/1.73m2    GFR est non-AA >60 >60 ml/min/1.73m2    Calcium 8.9 8.5 - 10.1 MG/DL    Bilirubin, total 0.4 0.2 - 1.0 MG/DL    ALT (SGPT) 14 12 - 78 U/L    AST (SGOT) 14 (L) 15 - 37 U/L    Alk.  phosphatase 83 45 - 117 U/L    Protein, total 7.4 6.4 - 8.2 g/dL    Albumin 3.6 3.5 - 5.0 g/dL    Globulin 3.8 2.0 - 4.0 g/dL    A-G Ratio 0.9 (L) 1.1 - 2.2     CK W/ REFLX CKMB    Collection Time: 03/19/17  9:02 PM   Result Value Ref Range    CK 84 39 - 308 U/L   TROPONIN I    Collection Time: 03/19/17  9:02 PM   Result Value Ref Range    Troponin-I, Qt. <0.04 <0.05 ng/mL   INFLUENZA A & B AG (RAPID TEST)    Collection Time: 03/19/17 11:10 PM   Result Value Ref Range    Influenza A Antigen NEGATIVE  NEG      Influenza B Antigen NEGATIVE  NEG     EKG, 12 LEAD, SUBSEQUENT    Collection Time: 03/19/17 11:55 PM   Result Value Ref Range    Ventricular Rate 94 BPM    Atrial Rate 94 BPM    P-R Interval 116 ms    QRS Duration 88 ms    Q-T Interval 358 ms    QTC Calculation (Bezet) 447 ms    Calculated P Axis 53 degrees    Calculated R Axis 39 degrees    Calculated T Axis 37 degrees    Diagnosis       Normal sinus rhythm  Normal ECG  When compared with ECG of 19-MAR-2017 19:06,  MANUAL COMPARISON REQUIRED, DATA IS UNCONFIRMED     CK W/ CKMB & INDEX    Collection Time: 03/20/17 12:00 AM   Result Value Ref Range    CK 82 39 - 308 U/L    CK - MB 1.4 <3.6 NG/ML    CK-MB Index 1.7 0 - 2.5     TROPONIN I    Collection Time: 03/20/17 12:00 AM   Result Value Ref Range    Troponin-I, Qt. <0.04 <0.05 ng/mL       IMAGING RESULTS:     CXR Results  (Last 48 hours)               03/19/17 2111  XR CHEST PA LAT Final result    Impression:  IMPRESSION: No acute abnormality identified                       Narrative:  EXAM:  XR CHEST PA LAT       INDICATION:   cp       COMPARISON: 2015. FINDINGS: PA and lateral radiographs of the chest demonstrate lungs clear of an   acute process process. There is scarring at the right base. Right coronary stent   is noted in position. . The cardiac and mediastinal contours and pulmonary   vascularity are normal.  The bones and soft tissues are within normal limits.                     MEDICATIONS GIVEN:  Medications   predniSONE (DELTASONE) tablet 60 mg (60 mg Oral Given 3/19/17 2312)   albuterol-ipratropium (DUO-NEB) 2.5 MG-0.5 MG/3 ML (3 mL Nebulization Given 3/19/17 2350)   levoFLOXacin (LEVAQUIN) tablet 750 mg (750 mg Oral Given 3/19/17 2312)   sodium chloride 0.9 % bolus infusion 500 mL (0 mL IntraVENous IV Completed 3/20/17 0012)   aspirin (ASPIRIN) tablet 325 mg (325 mg Oral Given 3/19/17 9745)       IMPRESSION:  1. Chronic obstructive pulmonary disease, unspecified [facility] type [facility]        PLAN:  1. Discharge Medication List as of 3/20/2017  1:01 AM      START taking these medications    Details   predniSONE (DELTASONE) 50 mg tablet Take 1 Tab by mouth daily for 5 days. , Print, Disp-5 Tab, R-0      doxycycline (VIBRA-TABS) 100 mg tablet Take 1 Tab by mouth two (2) times a day for 10 days. , Print, Disp-20 Tab, R-0      inhalational spacing device 1 Each by Does Not Apply route as needed. , Print, Disp-1 Device, R-0      Nebulizer & Compressor machine 1 Each by Does Not Apply route every four (4) hours. , Print, Disp-1 Each, R-0      albuterol (ACCUNEB) 1.25 mg/3 mL nebu Take 3 mL by inhalation every four (4) hours as needed (wheezing). , Print, Disp-25 Each, R-0         CONTINUE these medications which have NOT CHANGED    Details   pravastatin (PRAVACHOL) 40 mg tablet TAKE ONE TABLET BY MOUTH DAILY, Normal, Disp-30 Tab, R-10      ibuprofen (MOTRIN) 800 mg tablet Take 1 Tab by mouth every six (6) hours as needed for Pain. Indications: HEADACHE DISORDER, PAIN, Normal, Disp-120 Tab, R-0      losartan (COZAAR) 100 mg tablet TAKE ONE TABLET BY MOUTH DAILY, Normal, Disp-30 Tab, R-11      PULMICORT FLEXHALER 180 mcg/actuation aepb inhaler INHALE TWO PUFFS BY MOUTH TWICE A DAY, Normal, Disp-1 Inhaler, R-11      carvedilol (COREG) 3.125 mg tablet TAKE ONE TABLET BY MOUTH TWO TIMES A DAY WITH MEALS, Normal, Disp-60 Tab, R-7      levothyroxine (SYNTHROID) 100 mcg tablet Take 1 Tab by mouth Daily (before breakfast). , Normal, Disp-30 Tab, R-11      PROAIR HFA 90 mcg/actuation inhaler INHALE TWO PUFFS BY MOUTH EVERY 4 HOURS AS NEEDED FOR WHEEZING, Normal, Disp-1 Inhaler, R-5      TUDORZA PRESSAIR 400 mcg/actuation inhaler INHALE ONE DOSE BY MOUTH TWICE A DAY, Normal, Disp-1 Inhaler, R-10      traZODone (DESYREL) 50 mg tablet TAKE ONE TABLET BY MOUTH EVERY NIGHT AT BEDTIME AS NEEDED FOR SLEEP, Normal, Disp-30 Tab, R-5      cimetidine (TAGAMET) 400 mg tablet Take 200 mg by mouth two (2) times a day., Historical Med      aspirin delayed-release 81 mg tablet Take 1 Tab by mouth daily. , Print, Disp-30 Tab, R-11      nitroglycerin (NITROSTAT) 0.4 mg SL tablet 1 Tab by SubLINGual route every five (5) minutes as needed for Chest Pain., Print, Disp-1 Bottle, R-11           2. Follow-up Information     Follow up With Details Comments Contact Info    Samreen Obregon MD In 1 week  [address]  [phone]          Return to ED if worse     DISCHARGE NOTE:  1:06 AM  The patient's results have been reviewed with family and/or caregiver. They verbally convey their understanding and agreement of the patient's signs, symptoms, diagnosis, treatment, and prognosis. They additionally agree to follow up as recommended in the discharge instructions or to return to the Emergency Room should the patient's condition change prior to their follow-up appointment. The family and/or caregiver verbally agrees with the care-plan and all of their questions have been answered. The discharge instructions have also been provided to the them along with educational information regarding the patient's diagnosis and a list of reasons why the patient would want to return to the ER prior to their follow-up appointment should their condition change. I personally saw and examined the patient. Pt in NAD, able to speak in full sentences. I have reviewed and agree with the residents findings, including all diagnostic interpretations, and plans as written. I was present during the key portions of separately billed procedures. Aldair Juares MD          This note will not be viewable in [address]

## 2017-03-20 NOTE — DISCHARGE INSTRUCTIONS
COPD Exacerbation Plan: Care Instructions  Your Care Instructions  If you have chronic obstructive pulmonary disease (COPD), your usual shortness of breath could suddenly get worse. You may start coughing more and have more mucus. This flare-up is called a COPD exacerbation (say \"[id number]-[id number]-BAY-[id number]\"). A lung infection or air pollution could set off an exacerbation. Sometimes it can happen after a quick change in temperature or being around chemicals. Work with your doctor to make a plan for dealing with an exacerbation. You can better manage it if you plan ahead. Follow-up care is a key part of your treatment and safety. Be sure to make and go to all appointments, and call your doctor if you are having problems. It's also a good idea to know your test results and keep a list of the medicines you take. How can you care for yourself at home? During an exacerbation  · Do not panic if you start to have one. Quick treatment at home may help you prevent serious breathing problems. If you have a COPD exacerbation plan that you developed with your doctor, follow it. · Take your medicines exactly as your doctor tells you. ¨ Use your inhaler as directed by your doctor. If your symptoms do not get better after you use your medicine, have someone take you to the emergency room. Call an ambulance if necessary. ¨ With inhaled medicines, a spacer or a nebulizer may help you get more medicine to your lungs. Ask your doctor or pharmacist how to use them properly. Practice using the spacer in front of a mirror before you have an exacerbation. This may help you get the medicine into your lungs quickly. ¨ If your doctor has given you steroid pills, take them as directed. ¨ Your doctor may have given you a prescription for antibiotics, which you can fill if you need it. ¨ Talk to your doctor if you have any problems with your medicine. And call your doctor if you have to use your antibiotic or steroid pills.   Preventing an exacerbation  · Do not smoke. This is the most important step you can take to prevent more damage to your lungs and prevent problems. If you already smoke, it is never too late to stop. If you need help quitting, talk to your doctor about stop-smoking programs and medicines. These can increase your chances of quitting for good. · Take your daily medicines as prescribed. · Avoid colds and flu. ¨ Get a pneumococcal vaccine. ¨ Get a flu vaccine each year, as soon as it is available. Ask those you live or work with to do the same, so they will not get the flu and infect you. ¨ Try to stay away from people with colds or the flu. ¨ Wash your hands often. · Avoid secondhand smoke; air pollution; cold, dry air; hot, humid air; and high altitudes. Stay at home with your windows closed when air pollution is bad. · Learn breathing techniques for COPD, such as breathing through pursed lips. These techniques can help you breathe easier during an exacerbation. When should you call for help? Call 911 anytime you think you may need emergency care. For example, call if:  · You have severe trouble breathing. · You have severe chest pain. Call your doctor now or seek immediate medical care if:  · You have new or worse shortness of breath. · You develop new chest pain. · You are coughing more deeply or more often, especially if you notice more mucus or a change in the color of your mucus. · You cough up blood. · You have new or increased swelling in your legs or belly. · You have a fever. Watch closely for changes in your health, and be sure to contact your doctor if:  · You need to use your antibiotic or steroid pills. · Your symptoms are getting worse. Where can you learn more? Go to http://benjamin-irma.info/. Enter S040 in the search box to learn more about \"COPD Exacerbation Plan: Care Instructions. \"  Current as of: July 21, 2016  Content Version: 11.1  © 3806-8990 [employer]. Care instructions adapted under license by [employer] (which disclaims liability or warranty for this information). If you have questions about a medical condition or this instruction, always ask your healthcare professional. [address] any warranty or liability for your use of this information.

## 2017-03-20 NOTE — ED NOTES
Pt discharged with written instructions and prescriptions at this time. Pt verbalizes understanding and all questions were answered. Discharged by Jeannie Maxwell, in stable condition, with sister.

## 2017-03-20 NOTE — ED NOTES
Assumed care of pt at this time. Pt states that Saturday morning he started having difficulty breathing, coughing and NVD. Pt complains of no pain at this time. Assessment done at this time. . Call bell in reach. Sister at bedside. Pt placed on monitor x 2.

## 2017-03-21 ENCOUNTER — PATIENT OUTREACH (OUTPATIENT)
Dept: FAMILY MEDICINE CLINIC | Age: 62
End: 2017-03-21

## 2017-03-21 NOTE — PROGRESS NOTES
NN RINA   Left message. Haroon Marshall returned NN. RRAT score: Low Risk            9       Total Score        3 Relationship with PCP    6 Charlson Comorbidity Score        Criteria that do not apply:    Patient Living Status    Patient Length of Stay > 5    More than 1 Admission in calendar year    Patient Insurance is Medicare, Medicaid or Self Pay      Recent Labs      03/19/17   2102   NA  135*   K  4.5   CL  98   CO2  30   GLU  95   BUN  7   CREA  0.88   CA  8.9   ALB  3.6   SGOT  14*   ALT  14     No components found for: GLPOC  No results for input(s): PH, PCO2, PO2, HCO3, FIO2 in the last 72 hours. No results for input(s): INR in the last 72 hours. No lab exists for component: INREXT    Language spoken:    Allenmaria isabelameliaclementine 46:   Patient was offered the opportunity to discuss advance care planning:  no     Does patient have an Advance Directive:  no   If no, did you provide information on Caring Connections?  no     PCP/Specialist follow up: Patient scheduled to follow up with Fahad Melton MD on 4/19/17and first has an appointment with cardiologist on April 19, 2017. NN reviewed benefits of seeing provider sooner and Eddie refused. .  Reviewed red flags with patient, and patient verbalizes understanding. Patient given an opportunity to ask questions. No other clinical/social/functional needs noted. The patient agrees to contact the PCP office for questions related to their healthcare. The patient expressed thanks, offered no additional questions and ended the call. Medication:   Performed medication reconciliation with patient, and patient verbalizes understanding of administration of home medications. There were no barriers to obtaining medications identified at this time. EDUCATION:  Smoking cessation, second hand smoke, heart disease. Support system:  patient    Discharge Instructions :  Reviewed discharge instructions with patient.   Patient verbalizes understanding of discharge instructions and follow-up care. Red Flags:  Smoking, SOB, bronchitis. Labs Reviewed:  Recent Labs      17   2102   WBC  7.3   HGB  15.6   HCT  46.9   PLT  164         Medical History:     Past Medical History:   Diagnosis Date    Acute myocardial infarction, unspecified site, episode of care unspecified 2013    CAD (coronary artery disease)     Chest pain 10/15/2014    COPD (chronic obstructive pulmonary disease) (HCC)     Coronary atherosclerosis of native coronary artery 2013    Exercise induced bronchospasm 2013    Hypertension     Hyperthyroidism     Old myocardial infarction 2013    Tobacco use 2012           Nurse Navigator(NN) contacted the patient by telephone to perform post ED discharge assessment. Verified  and address with patient as identifiers. Provided introduction to self, and explanation of the Nurses Navigator role.       Diet:   Patient reports: Regular Diet    Activity:    Patient reports: mostly moving around the house

## 2017-04-03 ENCOUNTER — OFFICE VISIT (OUTPATIENT)
Dept: CARDIOLOGY CLINIC | Age: 62
End: 2017-04-03

## 2017-04-03 VITALS
OXYGEN SATURATION: 95 % | SYSTOLIC BLOOD PRESSURE: 118 MMHG | HEART RATE: 70 BPM | RESPIRATION RATE: 16 BRPM | DIASTOLIC BLOOD PRESSURE: 70 MMHG | HEIGHT: 67 IN | BODY MASS INDEX: 22.79 KG/M2 | WEIGHT: 145.19 LBS

## 2017-04-03 DIAGNOSIS — I25.2 OLD MYOCARDIAL INFARCTION: ICD-10-CM

## 2017-04-03 DIAGNOSIS — Z95.5 S/P CORONARY ARTERY STENT PLACEMENT: ICD-10-CM

## 2017-04-03 DIAGNOSIS — J44.9 CHRONIC OBSTRUCTIVE PULMONARY DISEASE, UNSPECIFIED COPD TYPE (HCC): ICD-10-CM

## 2017-04-03 DIAGNOSIS — I10 ESSENTIAL HYPERTENSION: ICD-10-CM

## 2017-04-03 DIAGNOSIS — E03.9 ACQUIRED HYPOTHYROIDISM: ICD-10-CM

## 2017-04-03 DIAGNOSIS — I25.10 ATHEROSCLEROSIS OF NATIVE CORONARY ARTERY OF NATIVE HEART WITHOUT ANGINA PECTORIS: ICD-10-CM

## 2017-04-03 DIAGNOSIS — I25.10 ASHD (ARTERIOSCLEROTIC HEART DISEASE): Primary | ICD-10-CM

## 2017-04-03 DIAGNOSIS — E78.5 DYSLIPIDEMIA: ICD-10-CM

## 2017-04-03 NOTE — LETTER
4/3/2017 2:15 PM 
 
Patient:  Skye Morgan YOB: 1955 Date of Visit: 4/3/2017 Dear Olivia Pena MD 
[address] [facility] [address] [id number] VIA In Basket 
 : Thank you for referring Mr. Grace Humphreys to me for evaluation/treatment. Below are the relevant portions of my assessment and plan of care. If you have questions, please do not hesitate to call me. I look forward to following Mr. Andrei Smith along with you.  
 
 
 
Sincerely, 
 
 
Carli Ramirez MD

## 2017-04-03 NOTE — PROGRESS NOTES
6 month follow up. Patient stares since stop smoking sob has improved. Also states has swelling in ankles.

## 2017-04-03 NOTE — MR AVS SNAPSHOT
Visit Information Date & Time Provider Department Dept. Phone Encounter #  
 4/3/2017  2:15 PM Bud Nguyen, [address] [place] [address] [facility] [id number] Your Appointments 4/3/2017  2:15 PM  
6 MONTH with Bud Nguyen MD  
[facility] [address] [facility]) Appt Note: 4/3/17 - ok to see NP  
 [address] [facility]  
[phone] [address] [facility]  
  
    
 4/19/2017  9:30 AM  
ROUTINE CARE with Solange Fernandez MD  
[facility] [address] [facility]) Appt Note: fu  2  mos [address] [facility] [address] [facility] [address]  
  
   
 [address] [facility] Upcoming Health Maintenance Date Due Hepatitis C Screening 1955 DTaP/Tdap/Td series (1 - Tdap) 9/16/1977 FOBT Q 1 YEAR AGE 50-75 9/16/2006 ZOSTER VACCINE AGE 60> 9/16/2016 Allergies as of 4/3/2017  Review Complete On: 4/3/2017 By: Alejo Webb NP Severity Noted Reaction Type Reactions Bupropion  12/28/2015    Other (comments) Urinary frequency and flatulence. Chantix [Varenicline]  03/03/2014    Other (comments) Trouble sleeping, soreness of mouth Codeine  05/05/2012    Itching Fluticasone  10/28/2014    Shortness of Breath, Swelling  
 possibly due to Nasonex [Mometasone]  12/29/2014   Side Effect Other (comments)  
 swelling of throat and hands possibly due to Current Immunizations  Reviewed on 1/18/2017 Name Date Influenza Vaccine (Quad) PF 10/19/2016, 11/16/2015 Influenza Vaccine PF 10/28/2014 Influenza Vaccine Split  Deferred (Patient Refused) Pneumococcal Polysaccharide (PPSV-23) 10/12/2015 Not reviewed this visit You Were Diagnosed With   
  
 Codes Comments ASHD (arteriosclerotic heart disease)    -  Primary ICD-10-CM: I25.10 ICD-9-CM: 414.00   
 Atherosclerosis of native coronary artery of native heart without angina pectoris     ICD-10-CM: I25.10 ICD-9-CM: 414.01 Essential hypertension     ICD-10-CM: I10 
ICD-9-CM: 401.9 Dyslipidemia     ICD-10-CM: E78.5 ICD-9-CM: 272.4 Old myocardial infarction     ICD-10-CM: I25.2 ICD-9-CM: 521 Acquired hypothyroidism     ICD-10-CM: E03.9 ICD-9-CM: 238. 9 Chronic obstructive pulmonary disease, unspecified COPD type ([facility] [place] [facility]     ICD-10-CM: J44.9 ICD-9-CM: 254 S/P coronary artery stent placement     ICD-10-CM: Z95.5 ICD-9-CM: V45.82 Vitals BP Pulse Resp Height(growth percentile) Weight(growth percentile) SpO2  
 118/70 (BP 1 Location: Right arm, BP Patient Position: Sitting) 70 16 5' 7\" (1.702 m) 145 lb 3 oz (65.9 kg) 95% BMI Smoking Status 22.74 kg/m2 Former Smoker Vitals History BMI and BSA Data Body Mass Index Body Surface Area 22.74 kg/m 2 1.77 m 2 Preferred Pharmacy Pharmacy Name Phone [facility] [address] [place] [address], [address] [place] [address] [phone] Your Updated Medication List  
  
   
This list is accurate as of: 4/3/17  2:11 PM.  Always use your most recent med list.  
  
  
  
  
 aspirin delayed-release 81 mg tablet Take 1 Tab by mouth daily. carvedilol 3.125 mg tablet Commonly known as:  COREG  
TAKE ONE TABLET BY MOUTH TWO TIMES A DAY WITH MEALS  
  
 cimetidine 400 mg tablet Commonly known as:  TAGAMET Take 200 mg by mouth two (2) times a day. ibuprofen 800 mg tablet Commonly known as:  MOTRIN Take 1 Tab by mouth every six (6) hours as needed for Pain. Indications: HEADACHE DISORDER, PAIN  
  
 inhalational spacing device 1 Each by Does Not Apply route as needed. levothyroxine 100 mcg tablet Commonly known as:  SYNTHROID Take 1 Tab by mouth Daily (before breakfast). losartan 100 mg tablet Commonly known as:  COZAAR  
TAKE ONE TABLET BY MOUTH DAILY Nebulizer & Compressor machine 1 Each by Does Not Apply route every four (4) hours. nitroglycerin 0.4 mg SL tablet Commonly known as:  NITROSTAT  
1 Tab by SubLINGual route every five (5) minutes as needed for Chest Pain.  
  
 pravastatin 40 mg tablet Commonly known as:  PRAVACHOL  
TAKE ONE TABLET BY MOUTH DAILY  
  
 * PROAIR HFA 90 mcg/actuation inhaler Generic drug:  albuterol INHALE TWO PUFFS BY MOUTH EVERY 4 HOURS AS NEEDED FOR WHEEZING  
  
 * albuterol 1.25 mg/3 mL Nebu Commonly known as:  Alyse Tappan Take 3 mL by inhalation every four (4) hours as needed (wheezing). PULMICORT FLEXHALER 180 mcg/actuation Aepb inhaler Generic drug:  budesonide INHALE TWO PUFFS BY MOUTH TWICE A DAY  
  
 traZODone 50 mg tablet Commonly known as:  DESYREL  
TAKE ONE TABLET BY MOUTH EVERY NIGHT AT BEDTIME AS NEEDED FOR SLEEP  
  
 TUDORZA PRESSAIR 400 mcg/actuation inhaler Generic drug:  aclidinium bromide INHALE ONE DOSE BY MOUTH TWICE A DAY * Notice: This list has 2 medication(s) that are the same as other medications prescribed for you. Read the directions carefully, and ask your doctor or other care provider to review them with you. We Performed the Following AMB POC EKG ROUTINE W/ 12 LEADS, INTER & REP [41695 CPT(R)] Introducing [facility] SERVICES! [facility] introduces [employer] patient portal. Now you can access parts of your medical record, email your doctor's office, and request medication refills online. 1. In your internet browser, go to [email or web address] 2. Click on the First Time User? Click Here link in the Sign In box. You will see the New Member Sign Up page. 3. Enter your [employer] Access Code exactly as it appears below. You will not need to use this code after youve completed the sign-up process. If you do not sign up before the expiration date, you must request a new code.  
 
· [employer] Access Code: [email or web address] 
 Expires: 4/18/2017 11:43 AM 
 
4. Enter the last four digits of your Social Security Number (xxxx) and Date of Birth (mm/dd/yyyy) as indicated and click Submit. You will be taken to the next sign-up page. 5. Create a [employer] ID. This will be your [employer] login ID and cannot be changed, so think of one that is secure and easy to remember. 6. Create a [employer] password. You can change your password at any time. 7. Enter your Password Reset Question and Answer. This can be used at a later time if you forget your password. 8. Enter your e-mail address. You will receive e-mail notification when new information is available in [address] 9. Click Sign Up. You can now view and download portions of your medical record. 10. Click the Download Summary menu link to download a portable copy of your medical information. If you have questions, please visit the Frequently Asked Questions section of the [employer] website. Remember, [employer] is NOT to be used for urgent needs. For medical emergencies, dial 911. Now available from your iPhone and Android! Please provide this summary of care documentation to your next provider. Your primary care clinician is listed as Marla Lazo. If you have any questions after today's visit, please call [phone].

## 2017-04-03 NOTE — PROGRESS NOTES
Subjective/HPI:     Leeanne Eubanks is a 62 y.o. male is here for f/u appt. The patient denies chest pain/worsening shortness of breath, orthopnea, PND, LE edema, palpitations, syncope, presyncope or fatigue. Recent COPD exacerbation, quit smoking. PCP Provider  Ivett Villar MD  Past Medical History:   Diagnosis Date    Acute myocardial infarction, unspecified site, episode of care unspecified 2/22/2013    CAD (coronary artery disease)     Chest pain 10/15/2014    COPD (chronic obstructive pulmonary disease) ([facility]     Coronary atherosclerosis of native coronary artery 2/22/2013    Exercise induced bronchospasm 2/22/2013    Hypertension     Hyperthyroidism     Old myocardial infarction 2/22/2013    Tobacco use 5/5/2012      Past Surgical History:   Procedure Laterality Date    CARDIAC SURG PROCEDURE UNLIST      stents may 2012     HX CATARACT REMOVAL Left 02/02/2016     Allergies   Allergen Reactions    Bupropion Other (comments)     Urinary frequency and flatulence.  Chantix [Varenicline] Other (comments)     Trouble sleeping, soreness of mouth    Codeine Itching    Fluticasone Shortness of Breath and Swelling     possibly due to    Nasonex [Mometasone] Other (comments)     swelling of throat and hands possibly due to      Family History   Problem Relation Age of Onset    Cancer Mother      lung cancer    Heart Disease Father     Thyroid Disease Brother     Thyroid Disease Maternal Aunt       Current Outpatient Prescriptions   Medication Sig    inhalational spacing device 1 Each by Does Not Apply route as needed.  Nebulizer & Compressor machine 1 Each by Does Not Apply route every four (4) hours.  albuterol (ACCUNEB) 1.25 mg/3 mL nebu Take 3 mL by inhalation every four (4) hours as needed (wheezing).     pravastatin (PRAVACHOL) 40 mg tablet TAKE ONE TABLET BY MOUTH DAILY    ibuprofen (MOTRIN) 800 mg tablet Take 1 Tab by mouth every six (6) hours as needed for Pain. Indications: HEADACHE DISORDER, PAIN    losartan (COZAAR) 100 mg tablet TAKE ONE TABLET BY MOUTH DAILY    PULMICORT FLEXHALER 180 mcg/actuation aepb inhaler INHALE TWO PUFFS BY MOUTH TWICE A DAY    carvedilol (COREG) 3.125 mg tablet TAKE ONE TABLET BY MOUTH TWO TIMES A DAY WITH MEALS    levothyroxine (SYNTHROID) 100 mcg tablet Take 1 Tab by mouth Daily (before breakfast).  PROAIR HFA 90 mcg/actuation inhaler INHALE TWO PUFFS BY MOUTH EVERY 4 HOURS AS NEEDED FOR WHEEZING    TUDORZA PRESSAIR 400 mcg/actuation inhaler INHALE ONE DOSE BY MOUTH TWICE A DAY    traZODone (DESYREL) 50 mg tablet TAKE ONE TABLET BY MOUTH EVERY NIGHT AT BEDTIME AS NEEDED FOR SLEEP    cimetidine (TAGAMET) 400 mg tablet Take 200 mg by mouth two (2) times a day.  aspirin delayed-release 81 mg tablet Take 1 Tab by mouth daily.  nitroglycerin (NITROSTAT) 0.4 mg SL tablet 1 Tab by SubLINGual route every five (5) minutes as needed for Chest Pain. No current facility-administered medications for this visit. Vitals:    04/03/17 1341   Height: 5' 7\" (1.702 m)     Social History     Social History    Marital status:      Spouse name: N/A    Number of children: N/A    Years of education: N/A     Occupational History    Not on file. Social History Main Topics    Smoking status: Current Every Day Smoker     Packs/day: 1.00     Years: 38.00     Types: Cigarettes    Smokeless tobacco: Never Used      Comment: Tried Chantix and had side effects    Alcohol use No    Drug use: Yes     Special: Marijuana      Comment: OCC.  Sexual activity: Not Currently     Other Topics Concern    Not on file     Social History Narrative       I have reviewed the nurses notes, vitals, problem list, allergy list, medical history, family, social history and medications. Review of Symptoms:    General: Pt denies excessive weight gain or loss.  Pt is able to conduct ADL's  HEENT: Denies blurred vision, headaches, epistaxis and difficulty swallowing. Respiratory: Denies worsening shortness of breath, ANGEL, wheezing or stridor. Cardiovascular: Denies precordial pain, palpitations, edema or PND  Gastrointestinal: Denies poor appetite, indigestion, abdominal pain or blood in stool  Urinary: Denies dysuria, pyuria  Musculoskeletal: Denies pain or swelling from muscles or joints  Neurologic: Denies tremor, paresthesias, or sensory motor disturbance  Skin: Denies rash, itching or texture change. Psych: Denies depression        Physical Exam:      General: Well developed, in no acute distress, cooperative and alert  HEENT: No carotid bruits, no JVD, trach is midline. Neck Supple, PEERL, EOM intact. Heart:  Normal S1/S2 negative S3 or S4. Regular, no murmur, gallop or rub.   Respiratory: Restrictive air sounds, Clear bilaterally x 4, no wheezing or rales  Abdomen:   Soft, non-tender, no masses, bowel sounds are active.   Extremities:  No edema, normal cap refill, no cyanosis, atraumatic. Neuro: A&Ox3, speech clear, gait stable. Skin: Skin color is normal. No rashes or lesions. Non diaphoretic  Vascular: 2+ pulses symmetric in all extremities    Cardiographics    ECG: Sinus  Rhythm HR 74  -Old inferior-apical infarct.      Results for orders placed or performed during the hospital encounter of 03/19/17   EKG, 12 LEAD, INITIAL   Result Value Ref Range    Ventricular Rate 82 BPM    Atrial Rate 82 BPM    P-R Interval 136 ms    QRS Duration 94 ms    Q-T Interval 384 ms    QTC Calculation (Bezet) 448 ms    Calculated P Axis 61 degrees    Calculated R Axis 76 degrees    Calculated T Axis 63 degrees    Diagnosis       Normal sinus rhythm  Normal ECG  Confirmed by Marycarmen Ruiz ([id number]) on 3/20/2017 8:10:32 AM           Cardiology Labs:  Lab Results   Component Value Date/Time    Cholesterol, total 102 06/02/2015 04:16 AM    HDL Cholesterol 38 06/02/2015 04:16 AM    LDL, calculated 33.6 06/02/2015 04:16 AM    Triglyceride 152 06/02/2015 04:16 AM    CHOL/HDL Ratio 2.7 06/02/2015 04:16 AM       Lab Results   Component Value Date/Time    Sodium 135 03/19/2017 09:02 PM    Potassium 4.5 03/19/2017 09:02 PM    Chloride 98 03/19/2017 09:02 PM    CO2 30 03/19/2017 09:02 PM    Anion gap 7 03/19/2017 09:02 PM    Glucose 95 03/19/2017 09:02 PM    BUN 7 03/19/2017 09:02 PM    Creatinine 0.88 03/19/2017 09:02 PM    BUN/Creatinine ratio 8 03/19/2017 09:02 PM    GFR est AA >60 03/19/2017 09:02 PM    GFR est non-AA >60 03/19/2017 09:02 PM    Calcium 8.9 03/19/2017 09:02 PM    AST (SGOT) 14 03/19/2017 09:02 PM    Alk. phosphatase 83 03/19/2017 09:02 PM    Protein, total 7.4 03/19/2017 09:02 PM    Albumin 3.6 03/19/2017 09:02 PM    Globulin 3.8 03/19/2017 09:02 PM    A-G Ratio 0.9 03/19/2017 09:02 PM    ALT (SGPT) 14 03/19/2017 09:02 PM           Assessment:     Assessment:     Eddie was seen today for coronary artery disease. Diagnoses and all orders for this visit:    Essential hypertension  -     AMB POC EKG ROUTINE W/ 12 LEADS, INTER & REP        ICD-10-CM ICD-9-CM    1. Essential hypertension I10 401.9 AMB POC EKG ROUTINE W/ 12 LEADS, INTER & REP     Orders Placed This Encounter    AMB POC EKG ROUTINE W/ 12 LEADS, INTER & REP     Order Specific Question:   Reason for Exam:     Answer:   routine        Plan:     Patient presents today for his 6 months f/u appt and denies new/worsening cardiac complaints. He remains in SR and has a normotensive BP. Congratulated on quitting smoking on the 17th of this month. His labs are being drawn by his PCP, we are filling his statin med. Asked that he speak with PCP about further refills going through his office as we are not receiving copies of labs to review. He reiterated understanding and agreed with plan. Continue current care and f/u in 6 months.     Jeannie Cortez NP

## 2017-04-19 ENCOUNTER — OFFICE VISIT (OUTPATIENT)
Dept: FAMILY MEDICINE CLINIC | Age: 62
End: 2017-04-19

## 2017-04-19 VITALS
TEMPERATURE: 97.9 F | OXYGEN SATURATION: 96 % | HEART RATE: 50 BPM | DIASTOLIC BLOOD PRESSURE: 76 MMHG | BODY MASS INDEX: 23.18 KG/M2 | WEIGHT: 148 LBS | SYSTOLIC BLOOD PRESSURE: 157 MMHG

## 2017-04-19 DIAGNOSIS — I10 ESSENTIAL HYPERTENSION: ICD-10-CM

## 2017-04-19 DIAGNOSIS — J44.9 CHRONIC OBSTRUCTIVE PULMONARY DISEASE, UNSPECIFIED COPD TYPE (HCC): Primary | ICD-10-CM

## 2017-04-19 DIAGNOSIS — G47.00 INSOMNIA, UNSPECIFIED TYPE: ICD-10-CM

## 2017-04-19 NOTE — MR AVS SNAPSHOT
Visit Information Date & Time Provider Department Dept. Phone Encounter #  
 4/19/2017  9:30 AM Kristal oCpeland, [address] [facility] [phone] [id number] Follow-up Instructions Return in about 6 weeks (around 5/31/2017). Your Appointments 10/18/2017  9:00 AM  
6 MONTH with David Christine MD  
[facility]) Appt Note: Per Papito Ford. $0CP REM  
 [address] [facility]  
[phone] [address] Upcoming Health Maintenance Date Due Hepatitis C Screening 1955 DTaP/Tdap/Td series (1 - Tdap) 9/16/1977 FOBT Q 1 YEAR AGE 50-75 9/16/2006 ZOSTER VACCINE AGE 60> 9/16/2016 Allergies as of 4/19/2017  Review Complete On: 4/19/2017 By: Kristal Copeland MD  
  
 Severity Noted Reaction Type Reactions Bupropion  12/28/2015    Other (comments) Urinary frequency and flatulence. Chantix [Varenicline]  03/03/2014    Other (comments) Trouble sleeping, soreness of mouth Codeine  05/05/2012    Itching Fluticasone  10/28/2014    Shortness of Breath, Swelling  
 possibly due to Nasonex [Mometasone]  12/29/2014   Side Effect Other (comments)  
 swelling of throat and hands possibly due to Current Immunizations  Reviewed on 1/18/2017 Name Date Influenza Vaccine (Quad) PF 10/19/2016, 11/16/2015 Influenza Vaccine PF 10/28/2014 Influenza Vaccine Split  Deferred (Patient Refused) Pneumococcal Polysaccharide (PPSV-23) 10/12/2015 Not reviewed this visit You Were Diagnosed With   
  
 Codes Comments Chronic obstructive pulmonary disease, unspecified COPD type ([facility].)    -  Primary ICD-10-CM: J44.9 ICD-9-CM: 672 Essential hypertension     ICD-10-CM: I10 
ICD-9-CM: 401.9 Insomnia, unspecified type     ICD-10-CM: G47.00 ICD-9-CM: 780.52 Vitals BP Pulse Temp Weight(growth percentile) SpO2 BMI 157/76 (BP 1 Location: Right arm, BP Patient Position: Sitting) (!) 50 97.9 °F (36.6 °C) (Oral) 148 lb (67.1 kg) 96% 23.18 kg/m2 Smoking Status Former Smoker Vitals History BMI and BSA Data Body Mass Index Body Surface Area  
 23.18 kg/m 2 1.78 m 2 Preferred Pharmacy Pharmacy Name Phone [facility] [address] , [address] [place] [address] [phone] Your Updated Medication List  
  
   
This list is accurate as of: 4/19/17 10:39 AM.  Always use your most recent med list.  
  
  
  
  
 aspirin delayed-release 81 mg tablet Take 1 Tab by mouth daily. carvedilol 3.125 mg tablet Commonly known as:  COREG  
TAKE ONE TABLET BY MOUTH TWO TIMES A DAY WITH MEALS  
  
 cimetidine 400 mg tablet Commonly known as:  TAGAMET Take 200 mg by mouth two (2) times a day. ibuprofen 800 mg tablet Commonly known as:  MOTRIN Take 1 Tab by mouth every six (6) hours as needed for Pain. Indications: HEADACHE DISORDER, PAIN  
  
 inhalational spacing device 1 Each by Does Not Apply route as needed. levothyroxine 100 mcg tablet Commonly known as:  SYNTHROID Take 1 Tab by mouth Daily (before breakfast). losartan 100 mg tablet Commonly known as:  COZAAR  
TAKE ONE TABLET BY MOUTH DAILY Nebulizer & Compressor machine 1 Each by Does Not Apply route every four (4) hours. nitroglycerin 0.4 mg SL tablet Commonly known as:  NITROSTAT  
1 Tab by SubLINGual route every five (5) minutes as needed for Chest Pain.  
  
 pravastatin 40 mg tablet Commonly known as:  PRAVACHOL  
TAKE ONE TABLET BY MOUTH DAILY  
  
 * PROAIR HFA 90 mcg/actuation inhaler Generic drug:  albuterol INHALE TWO PUFFS BY MOUTH EVERY 4 HOURS AS NEEDED FOR WHEEZING  
  
 * albuterol 1.25 mg/3 mL Nebu Commonly known as:  Susi Jeferson Take 3 mL by inhalation every four (4) hours as needed (wheezing). PULMICORT FLEXHALER 180 mcg/actuation Aepb inhaler Generic drug:  budesonide INHALE TWO PUFFS BY MOUTH TWICE A DAY  
  
 traZODone 50 mg tablet Commonly known as:  DESYREL  
TAKE ONE TABLET BY MOUTH EVERY NIGHT AT BEDTIME AS NEEDED FOR SLEEP  
  
 TUDORZA PRESSAIR 400 mcg/actuation inhaler Generic drug:  aclidinium bromide INHALE ONE DOSE BY MOUTH TWICE A DAY * Notice: This list has 2 medication(s) that are the same as other medications prescribed for you. Read the directions carefully, and ask your doctor or other care provider to review them with you. Follow-up Instructions Return in about 6 weeks (around 5/31/2017). Patient Instructions Insomnia: Care Instructions Your Care Instructions Insomnia is the inability to sleep well. It is a common problem for most people at some time. Insomnia may make it hard for you to get to sleep, stay asleep, or sleep as long as you need to. This can make you tired and grouchy during the day. It can also make you forgetful, less effective at work, and unhappy. Insomnia can be caused by conditions such as depression or anxiety. Pain can also affect your ability to sleep. When these problems are solved, the insomnia usually clears up. But sometimes bad sleep habits can cause insomnia. If insomnia is affecting your work or your enjoyment of life, you can take steps to improve your sleep. Follow-up care is a key part of your treatment and safety. Be sure to make and go to all appointments, and call your doctor if you are having problems. It's also a good idea to know your test results and keep a list of the medicines you take. How can you care for yourself at home? What to avoid · Do not have drinks with caffeine, such as coffee or black tea, for 8 hours before bed. · Do not smoke or use other types of tobacco near bedtime. Nicotine is a stimulant and can keep you awake. · Avoid drinking alcohol late in the evening, because it can cause you to wake in the middle of the night. · Do not eat a big meal close to bedtime. If you are hungry, eat a light snack. · Do not drink a lot of water close to bedtime, because the need to urinate may wake you up during the night. · Do not read or watch TV in bed. Use the bed only for sleeping and sexual activity. What to try · Go to bed at the same time every night, and wake up at the same time every morning. Do not take naps during the day. · Keep your bedroom quiet, dark, and cool. · Sleep on a comfortable pillow and mattress. · If watching the clock makes you anxious, turn it facing away from you so you cannot see the time. · If you worry when you lie down, start a worry book. Well before bedtime, write down your worries, and then set the book and your concerns aside. · Try meditation or other relaxation techniques before you go to bed. · If you cannot fall asleep, get up and go to another room until you feel sleepy. Do something relaxing. Repeat your bedtime routine before you go to bed again. · Make your house quiet and calm about an hour before bedtime. Turn down the lights, turn off the TV, log off the computer, and turn down the volume on music. This can help you relax after a busy day. When should you call for help? Watch closely for changes in your health, and be sure to contact your doctor if: 
· Your efforts to improve your sleep do not work. · Your insomnia gets worse. · You have been feeling down, depressed, or hopeless or have lost interest in things that you usually enjoy. Where can you learn more? Go to http://benjamin-irma.info/. Enter P513 in the search box to learn more about \"Insomnia: Care Instructions. \" Current as of: July 26, 2016 Content Version: 11.2 © 1931-6116 [employer].  Care instructions adapted under license by [address] (which disclaims liability or warranty for this information). If you have questions about a medical condition or this instruction, always ask your healthcare professional. [address] any warranty or liability for your use of this information. Introducing [facility]! Kelin Ricci introduces CromoUp patient portal. Now you can access parts of your medical record, email your doctor's office, and request medication refills online. 1. In your internet browser, go to [email or web address] 2. Click on the First Time User? Click Here link in the Sign In box. You will see the New Member Sign Up page. 3. Enter your CromoUp Access Code exactly as it appears below. You will not need to use this code after youve completed the sign-up process. If you do not sign up before the expiration date, you must request a new code. · [employer]oUp Access Code: [email or web address] Expires: 7/18/2017 10:39 AM 
 
4. Enter the last four digits of your Social Security Number (xxxx) and Date of Birth (mm/dd/yyyy) as indicated and click Submit. You will be taken to the next sign-up page. 5. Create a CromoUp ID. This will be your [employer]oUp login ID and cannot be changed, so think of one that is secure and easy to remember. 6. Create a CromoUp password. You can change your password at any time. 7. Enter your Password Reset Question and Answer. This can be used at a later time if you forget your password. 8. Enter your e-mail address. You will receive e-mail notification when new information is available in [address] 9. Click Sign Up. You can now view and download portions of your medical record. 10. Click the Download Summary menu link to download a portable copy of your medical information. If you have questions, please visit the Frequently Asked Questions section of the [employer]oUp website.  Remember, CromoUp is NOT to be used for urgent needs. For medical emergencies, dial 911. Now available from your iPhone and Android! Please provide this summary of care documentation to your next provider. Your primary care clinician is listed as Jacklyn Jaquez. If you have any questions after today's visit, please call [phone].

## 2017-04-19 NOTE — PATIENT INSTRUCTIONS

## 2017-04-19 NOTE — PROGRESS NOTES
Subjective:     Fito Auguste is a 62 y.o. male who presents for follow up of hypertension, COPD, and CAD. He was seen  at the ED for exacerbation of COPD on 3/19. After this he decided to stop smoking. Has been smoke free since 3/19! Patient Active Problem List   Diagnosis Code    STEMI (ST elevation myocardial infarction) ([facility] I21.3    Tobacco use Z72.0    S/P coronary artery stent placement Z95.5    Dyslipidemia E78.5    Coronary atherosclerosis of native coronary artery I25.10    Exercise induced bronchospasm J45.990    Old myocardial infarction I25.2    [facility] obstructive [facility]) [facility] J44.9    Insomnia G47.00    Unstable angina pectoris (HCC) I20.0    Essential hypertension I10    ASHD (arteriosclerotic heart disease) I25.10    Cigarette smoker F17.210    Acquired hypothyroidism E03.9       Current Outpatient Prescriptions   Medication Sig    inhalational spacing device 1 Each by Does Not Apply route as needed.  Nebulizer & Compressor machine 1 Each by Does Not Apply route every four (4) hours.  albuterol (ACCUNEB) 1.25 mg/3 mL nebu Take 3 mL by inhalation every four (4) hours as needed (wheezing).  pravastatin (PRAVACHOL) 40 mg tablet TAKE ONE TABLET BY MOUTH DAILY    ibuprofen (MOTRIN) 800 mg tablet Take 1 Tab by mouth every six (6) hours as needed for Pain. Indications: HEADACHE DISORDER, PAIN    losartan (COZAAR) 100 mg tablet TAKE ONE TABLET BY MOUTH DAILY    PULMICORT FLEXHALER 180 mcg/actuation aepb inhaler INHALE TWO PUFFS BY MOUTH TWICE A DAY    carvedilol (COREG) 3.125 mg tablet TAKE ONE TABLET BY MOUTH TWO TIMES A DAY WITH MEALS    levothyroxine (SYNTHROID) 100 mcg tablet Take 1 Tab by mouth Daily (before breakfast).     TUDORZA PRESSAIR 400 mcg/actuation inhaler INHALE ONE DOSE BY MOUTH TWICE A DAY    traZODone (DESYREL) 50 mg tablet TAKE ONE TABLET BY MOUTH EVERY NIGHT AT BEDTIME AS NEEDED FOR SLEEP    aspirin delayed-release 81 mg tablet Take 1 Tab by mouth daily.  PROAIR HFA 90 mcg/actuation inhaler INHALE TWO PUFFS BY MOUTH EVERY 4 HOURS AS NEEDED FOR WHEEZING    cimetidine (TAGAMET) 400 mg tablet Take 200 mg by mouth two (2) times a day.  nitroglycerin (NITROSTAT) 0.4 mg SL tablet 1 Tab by SubLINGual route every five (5) minutes as needed for Chest Pain. No current facility-administered medications for this visit. Allergies   Allergen Reactions    Bupropion Other (comments)     Urinary frequency and flatulence.  Chantix [Varenicline] Other (comments)     Trouble sleeping, soreness of mouth    Codeine Itching    Fluticasone Shortness of Breath and Swelling     possibly due to    Nasonex [Mometasone] Other (comments)     swelling of throat and hands possibly due to       Diet and Lifestyle: not attempting to follow a low sodium diet      BP readings outside office:  Not checking but has BP cuff at home. Cardiovascular ROS: taking medications as instructed, no medication side effects noted, no TIA's, noting some chest pains described as brief and without pattern, less dyspnea on exertion, no swelling of ankles. Using less rescue inhaler. Review of Systems, additional:  Pertinent items are noted in HPI.       Past Medical History:   Diagnosis Date    Acute myocardial infarction, unspecified site, episode of care unspecified 2/22/2013    CAD (coronary artery disease)     Chest pain 10/15/2014    COPD (chronic obstructive pulmonary disease) (HCC)     Coronary atherosclerosis of native coronary artery 2/22/2013    Exercise induced bronchospasm 2/22/2013    Hypertension     Hyperthyroidism     Old myocardial infarction 2/22/2013    Tobacco use 5/5/2012     Past Surgical History:   Procedure Laterality Date    CARDIAC SURG PROCEDURE UNLIST      stents may 2012     HX CATARACT REMOVAL Left 02/02/2016     Family History   Problem Relation Age of Onset    Cancer Mother      lung cancer    Heart Disease Father     Thyroid Disease Brother     Thyroid Disease Maternal Aunt      Social History   Substance Use Topics    Smoking status: Former Smoker     Packs/day: 1.00     Years: 38.00     Types: Cigarettes     Quit date: 3/17/2017    Smokeless tobacco: Never Used      Comment: Tried Chantix and had side effects. Quit 3/2017.  Alcohol use No          Objective:     Vitals:    04/19/17 0920 04/19/17 0929 04/19/17 1033 04/19/17 1034   BP: 155/82 160/89 150/86 157/76   Pulse: (!) 46 (!) 50 62 (!) 50   Temp: 97.9 °F (36.6 °C)      TempSrc: Oral      SpO2: 96%      Weight: 148 lb (67.1 kg)          Physical Exam  General appearance: alert, cooperative, no distress, appears stated age    Lungs: clear to auscultation bilaterally, no wheezes, no increased work of breathing  Heart: regular rate and rhythm, S1, S2 normal, no murmur, click, rub or gallop  Extremities: extremities normal, no cyanosis or edema  Skin: color, texture, turgor normal. No rashes or lesions  Neurologic: Alert and oriented, grossly normal exam. Normal coordination and gait      Lab results below reviewed at this visit:  Results for orders placed or performed during the hospital encounter of 03/19/17   INFLUENZA A & B AG (RAPID TEST)   Result Value Ref Range    Influenza A Antigen NEGATIVE  NEG      Influenza B Antigen NEGATIVE  NEG     CBC WITH AUTOMATED DIFF   Result Value Ref Range    WBC 7.3 4.1 - 11.1 K/uL    RBC 4.92 4.10 - 5.70 M/uL    HGB 15.6 12.1 - 17.0 g/dL    HCT 46.9 36.6 - 50.3 %    MCV 95.3 80.0 - 99.0 FL    MCH 31.7 26.0 - 34.0 PG    MCHC 33.3 30.0 - 36.5 g/dL    RDW 13.3 11.5 - 14.5 %    PLATELET 924 901 - 311 K/uL    NEUTROPHILS 51 32 - 75 %    LYMPHOCYTES 28 12 - 49 %    MONOCYTES 19 (H) 5 - 13 %    EOSINOPHILS 2 0 - 7 %    BASOPHILS 0 0 - 1 %    ABS. NEUTROPHILS 3.7 1.8 - 8.0 K/UL    ABS. LYMPHOCYTES 2.0 0.8 - 3.5 K/UL    ABS. MONOCYTES 1.4 (H) 0.0 - 1.0 K/UL    ABS. EOSINOPHILS 0.2 0.0 - 0.4 K/UL    ABS.  BASOPHILS 0.0 0.0 - 0.1 K/UL   METABOLIC PANEL, COMPREHENSIVE   Result Value Ref Range    Sodium 135 (L) 136 - 145 mmol/L    Potassium 4.5 3.5 - 5.1 mmol/L    Chloride 98 97 - 108 mmol/L    CO2 30 21 - 32 mmol/L    Anion gap 7 5 - 15 mmol/L    Glucose 95 65 - 100 mg/dL    BUN 7 6 - 20 MG/DL    Creatinine 0.88 0.70 - 1.30 MG/DL    BUN/Creatinine ratio 8 (L) 12 - 20      GFR est AA >60 >60 ml/min/1.73m2    GFR est non-AA >60 >60 ml/min/1.73m2    Calcium 8.9 8.5 - 10.1 MG/DL    Bilirubin, total 0.4 0.2 - 1.0 MG/DL    ALT (SGPT) 14 12 - 78 U/L    AST (SGOT) 14 (L) 15 - 37 U/L    Alk.  phosphatase 83 45 - 117 U/L    Protein, total 7.4 6.4 - 8.2 g/dL    Albumin 3.6 3.5 - 5.0 g/dL    Globulin 3.8 2.0 - 4.0 g/dL    A-G Ratio 0.9 (L) 1.1 - 2.2     CK W/ REFLX CKMB   Result Value Ref Range    CK 84 39 - 308 U/L   TROPONIN I   Result Value Ref Range    Troponin-I, Qt. <0.04 <0.05 ng/mL   CK W/ CKMB & INDEX   Result Value Ref Range    CK 82 39 - 308 U/L    CK - MB 1.4 <3.6 NG/ML    CK-MB Index 1.7 0 - 2.5     TROPONIN I   Result Value Ref Range    Troponin-I, Qt. <0.04 <0.05 ng/mL   EKG, 12 LEAD, INITIAL   Result Value Ref Range    Ventricular Rate 82 BPM    Atrial Rate 82 BPM    P-R Interval 136 ms    QRS Duration 94 ms    Q-T Interval 384 ms    QTC Calculation (Bezet) 448 ms    Calculated P Axis 61 degrees    Calculated R Axis 76 degrees    Calculated T Axis 63 degrees    Diagnosis       Normal sinus rhythm  Normal ECG  Confirmed by Gregg Pickens ([id number]) on 3/20/2017 8:10:32 AM     EKG, 12 LEAD, SUBSEQUENT   Result Value Ref Range    Ventricular Rate 94 BPM    Atrial Rate 94 BPM    P-R Interval 116 ms    QRS Duration 88 ms    Q-T Interval 358 ms    QTC Calculation (Bezet) 447 ms    Calculated P Axis 53 degrees    Calculated R Axis 39 degrees    Calculated T Axis 37 degrees    Diagnosis       Normal sinus rhythm  Normal ECG    Confirmed by Gregg Pickens ([id number]) on 3/20/2017 8:10:35 AM           Lab Results   Component Value Date/Time Cholesterol, total 102 06/02/2015 04:16 AM    HDL Cholesterol 38 06/02/2015 04:16 AM    LDL, calculated 33.6 06/02/2015 04:16 AM    VLDL, calculated 30.4 06/02/2015 04:16 AM    Triglyceride 152 06/02/2015 04:16 AM    CHOL/HDL Ratio 2.7 06/02/2015 04:16 AM         Assessment/Plan:       ICD-10-CM ICD-9-CM    1. Chronic obstructive pulmonary disease, unspecified COPD type ([facility] J44.9 496    2. Essential hypertension I10 401.9    3. Insomnia, unspecified type G47.00 780.52        Try to check BP at home with new BP cuff and bring readings in next visit as BP is elevated today. Praised about stopping smoking! Follow-up Disposition:  Return in about 6 weeks (around 5/31/2017).

## 2017-04-19 NOTE — PROGRESS NOTES
Coordination of Care  1. Have you been to the ER, urgent care clinic since your last visit? Hospitalized since your last visit? Yes When: March/19/2017   [facility]    2. Have you seen or consulted any other health care providers outside of the [address] [place] [address] since your last visit? Include any pap smears or colon screening. No    Medications  Medication Reconciliation Performed: yes  Patient does not know need refills     Learning Assessment Complete?  yes

## 2017-05-08 RX ORDER — TRAZODONE HYDROCHLORIDE 50 MG/1
TABLET ORAL
Qty: 30 TAB | Refills: 4 | Status: SHIPPED | OUTPATIENT
Start: 2017-05-08 | End: 2017-10-02 | Stop reason: SDUPTHER

## 2017-05-15 RX ORDER — ACLIDINIUM BROMIDE 400 UG/1
POWDER, METERED RESPIRATORY (INHALATION)
Qty: 1 INHALER | Refills: 5 | Status: SHIPPED | OUTPATIENT
Start: 2017-05-15 | End: 2017-11-08 | Stop reason: SDUPTHER

## 2017-05-31 ENCOUNTER — OFFICE VISIT (OUTPATIENT)
Dept: FAMILY MEDICINE CLINIC | Age: 62
End: 2017-05-31

## 2017-05-31 VITALS
WEIGHT: 155 LBS | HEART RATE: 54 BPM | SYSTOLIC BLOOD PRESSURE: 115 MMHG | BODY MASS INDEX: 24.28 KG/M2 | DIASTOLIC BLOOD PRESSURE: 67 MMHG | TEMPERATURE: 98.1 F

## 2017-05-31 DIAGNOSIS — E03.9 ACQUIRED HYPOTHYROIDISM: ICD-10-CM

## 2017-05-31 DIAGNOSIS — Z11.59 NEED FOR HEPATITIS C SCREENING TEST: ICD-10-CM

## 2017-05-31 DIAGNOSIS — R53.83 FATIGUE, UNSPECIFIED TYPE: ICD-10-CM

## 2017-05-31 DIAGNOSIS — J44.9 CHRONIC OBSTRUCTIVE PULMONARY DISEASE, UNSPECIFIED COPD TYPE (HCC): Primary | ICD-10-CM

## 2017-05-31 DIAGNOSIS — R25.2 MUSCLE CRAMPS: ICD-10-CM

## 2017-05-31 DIAGNOSIS — I10 ESSENTIAL HYPERTENSION: ICD-10-CM

## 2017-05-31 NOTE — PROGRESS NOTES
Subjective:     Gabriela Cleveland is a 62 y.o. male who presents for follow up of hypertension, COPD, and CAD    He continues to not smoke. Gets cravings some but resists. He feels that he will not restart smoking again. Has been noting cramps in hands and feet lately. Has had in hands for a long time. Not daily. Day or night does not seem to matter. Feeling tired. Napping helps. Has also noted some intermittent sharp lower sub sternal chest pain without any pattern. Not exertional.     He feels he is sleeping well. Awakens somewhat refreshed. Not tired in early AM.      Patient Active Problem List   Diagnosis Code    STEMI (ST elevation myocardial infarction) ([facility] I21.3    Tobacco use Z72.0    S/P coronary artery stent placement Z95.5    Dyslipidemia E78.5    Coronary atherosclerosis of native coronary artery I25.10    Exercise induced bronchospasm J45.990    Old myocardial infarction I25.2    COPD (chronic obstructive pulmonary disease) ([facility]) J44.9    Insomnia G47.00    Unstable angina pectoris ([facility]) I20.0    Essential hypertension I10    ASHD (arteriosclerotic heart disease) I25.10    Cigarette smoker F17.210    Acquired hypothyroidism E03.9       Current Outpatient Prescriptions   Medication Sig    carvedilol (COREG) 3.125 mg tablet TAKE ONE TABLET BY MOUTH TWICE A DAY WITH MEALS    TUDORZA PRESSAIR 400 mcg/actuation inhaler INHALE ONE DOSE BY MOUTH TWICE A DAY    traZODone (DESYREL) 50 mg tablet TAKE ONE TABLET BY MOUTH EVERY NIGHT AT BEDTIME AS NEEDED FOR SLEEP    inhalational spacing device 1 Each by Does Not Apply route as needed.  Nebulizer & Compressor machine 1 Each by Does Not Apply route every four (4) hours.  albuterol (ACCUNEB) 1.25 mg/3 mL nebu Take 3 mL by inhalation every four (4) hours as needed (wheezing).     pravastatin (PRAVACHOL) 40 mg tablet TAKE ONE TABLET BY MOUTH DAILY    ibuprofen (MOTRIN) 800 mg tablet Take 1 Tab by mouth every six (6) hours as needed for Pain. Indications: HEADACHE DISORDER, PAIN    losartan (COZAAR) 100 mg tablet TAKE ONE TABLET BY MOUTH DAILY    PULMICORT FLEXHALER 180 mcg/actuation aepb inhaler INHALE TWO PUFFS BY MOUTH TWICE A DAY    levothyroxine (SYNTHROID) 100 mcg tablet Take 1 Tab by mouth Daily (before breakfast).  PROAIR HFA 90 mcg/actuation inhaler INHALE TWO PUFFS BY MOUTH EVERY 4 HOURS AS NEEDED FOR WHEEZING    cimetidine (TAGAMET) 400 mg tablet Take 200 mg by mouth two (2) times a day.  aspirin delayed-release 81 mg tablet Take 1 Tab by mouth daily.  nitroglycerin (NITROSTAT) 0.4 mg SL tablet 1 Tab by SubLINGual route every five (5) minutes as needed for Chest Pain. No current facility-administered medications for this visit. Allergies   Allergen Reactions    Bupropion Other (comments)     Urinary frequency and flatulence.  Chantix [Varenicline] Other (comments)     Trouble sleeping, soreness of mouth    Codeine Itching    Fluticasone Shortness of Breath and Swelling     possibly due to    Nasonex [Mometasone] Other (comments)     swelling of throat and hands possibly due to       Diet and Lifestyle: generally follows a low sodium diet. Has decreased his salt intake from previously. BP readings outside office:  118 - 126/ 62 - 83    Cardiovascular ROS: taking medications as instructed, no medication side effects noted, no TIA's, no chest pain on exertion, notes stable dyspnea on exertion, no change, no swelling of ankles. Review of Systems, additional:  Pertinent items are noted in HPI.       Past Surgical History:   Procedure Laterality Date    CARDIAC SURG PROCEDURE UNLIST      stents may 2012     HX CATARACT REMOVAL Left 02/02/2016     Family History   Problem Relation Age of Onset    Cancer Mother      lung cancer    Heart Disease Father     Thyroid Disease Brother     Thyroid Disease Maternal Aunt      Social History   Substance Use Topics    Smoking status: Former Smoker Packs/day: 1.00     Years: 38.00     Types: Cigarettes     Quit date: 3/17/2017    Smokeless tobacco: Never Used      Comment: Tried Chantix and had side effects. Quit 3/2017.  Alcohol use No          Objective:     Visit Vitals    /67 (BP 1 Location: Right arm, BP Patient Position: Sitting)    Pulse (!) 54    Temp 98.1 °F (36.7 °C) (Oral)    Wt 155 lb (70.3 kg)    BMI 24.28 kg/m2     Body mass index is 24.28 kg/(m^2). Physical Exam  General appearance: alert, cooperative, no distress, appears stated age    Lungs: clear to auscultation bilaterally, no wheezes, no increased work of breathing  Heart: regular rate and rhythm, S1, S2 normal, no murmur, click, rub or gallop  Extremities: extremities normal, no cyanosis or edema  Skin: color, texture, turgor normal. No rashes or lesions  Neurologic: Alert and oriented, grossly normal exam. Normal coordination and gait      Lab results below reviewed at this visit:    Lab Results   Component Value Date/Time    Sodium 135 03/19/2017 09:02 PM    Potassium 4.5 03/19/2017 09:02 PM    Chloride 98 03/19/2017 09:02 PM    CO2 30 03/19/2017 09:02 PM    Anion gap 7 03/19/2017 09:02 PM    Glucose 95 03/19/2017 09:02 PM    BUN 7 03/19/2017 09:02 PM    Creatinine 0.88 03/19/2017 09:02 PM    BUN/Creatinine ratio 8 03/19/2017 09:02 PM    GFR est AA >60 03/19/2017 09:02 PM    GFR est non-AA >60 03/19/2017 09:02 PM    Calcium 8.9 03/19/2017 09:02 PM       Lab Results   Component Value Date/Time    Cholesterol, total 102 06/02/2015 04:16 AM    HDL Cholesterol 38 06/02/2015 04:16 AM    LDL, calculated 33.6 06/02/2015 04:16 AM    VLDL, calculated 30.4 06/02/2015 04:16 AM    Triglyceride 152 06/02/2015 04:16 AM    CHOL/HDL Ratio 2.7 06/02/2015 04:16 AM       CXR in 3/17 was WNL. Assessment/Plan:       ICD-10-CM ICD-9-CM    1. Chronic obstructive pulmonary disease, unspecified COPD type ([facility] J44.9 496 CBC W/O DIFF   2.  Essential hypertension F63 678.6 METABOLIC PANEL, BASIC      CBC W/O DIFF      MAGNESIUM      CANCELED: MAGNESIUM   3. Acquired hypothyroidism E03.9 244.9 TSH 3RD GENERATION   4. Fatigue, unspecified type R53.83 780.79    5. Muscle cramps R25.2 729.82 MAGNESIUM      CANCELED: MAGNESIUM   6. Need for hepatitis C screening test Z11.59 V73.89 HEPATITIS C AB       Praised again for stopping smoking. His chest symptoms do not sound like typical cardiac symptoms. Is not exertion related. He feels it is not GERD. His COPD seems to be stable currently. His blood pressure is controlled. Will check lab work to evaluate for his fatigue and for possible electrolyte disorder causing cramps. Screen for hepatitis C as recommended for his age group. This might cause fatigue also. Says his brother got this from surgery in past.      Follow-up Disposition:  Return in about 1 month (around 6/30/2017). Discuss lab results then.

## 2017-05-31 NOTE — MR AVS SNAPSHOT
Visit Information Date & Time Provider Department Dept. Phone Encounter #  
 5/31/2017 10:10 AM Carlos Swift MD [facility] [phone] [id number] Follow-up Instructions Return in about 1 month (around 6/30/2017). Your Appointments 10/18/2017  9:00 AM  
6 MONTH with Terri Carvalho MD  
[facility]) Appt Note: Per Vamsi Prakash. $0CP REM  
 [facility]  
[phone] [address] [facility] Upcoming Health Maintenance Date Due Hepatitis C Screening 1955 DTaP/Tdap/Td series (1 - Tdap) 9/16/1977 FOBT Q 1 YEAR AGE 50-75 9/16/2006 ZOSTER VACCINE AGE 60> 9/16/2016 INFLUENZA AGE 9 TO ADULT 8/1/2017 Allergies as of 5/31/2017  Review Complete On: 5/31/2017 By: Carlos Swift MD  
  
 Severity Noted Reaction Type Reactions Bupropion  12/28/2015    Other (comments) Urinary frequency and flatulence. Chantix [Varenicline]  03/03/2014    Other (comments) Trouble sleeping, soreness of mouth Codeine  05/05/2012    Itching Fluticasone  10/28/2014    Shortness of Breath, Swelling  
 possibly due to Nasonex [Mometasone]  12/29/2014   Side Effect Other (comments)  
 swelling of throat and hands possibly due to Current Immunizations  Reviewed on 1/18/2017 Name Date Influenza Vaccine (Quad) PF 10/19/2016, 11/16/2015 Influenza Vaccine PF 10/28/2014 Influenza Vaccine Split  Deferred (Patient Refused) Pneumococcal Polysaccharide (PPSV-23) 10/12/2015 Not reviewed this visit You Were Diagnosed With   
  
 Codes Comments Chronic obstructive pulmonary disease, unspecified COPD type ([facility]    -  Primary ICD-10-CM: J44.9 ICD-9-CM: 057 Essential hypertension     ICD-10-CM: I10 
ICD-9-CM: 401.9 Acquired hypothyroidism     ICD-10-CM: E03.9 ICD-9-CM: 244.9 Fatigue, unspecified type     ICD-10-CM: R53.83 ICD-9-CM: 780.79 Muscle cramps     ICD-10-CM: R25.2 ICD-9-CM: 729.82 Vitals BP Pulse Temp Weight(growth percentile) BMI Smoking Status 115/67 (BP 1 Location: Right arm, BP Patient Position: Sitting) (!) 54 98.1 °F (36.7 °C) (Oral) 155 lb (70.3 kg) 24.28 kg/m2 Former Smoker Vitals History BMI and BSA Data Body Mass Index Body Surface Area  
 24.28 kg/m 2 1.82 m 2 Preferred Pharmacy Pharmacy Name Phone [facility] [address] , [address] [facility] [phone] Your Updated Medication List  
  
   
This list is accurate as of: 5/31/17 10:40 AM.  Always use your most recent med list.  
  
  
  
  
 aspirin delayed-release 81 mg tablet Take 1 Tab by mouth daily. carvedilol 3.125 mg tablet Commonly known as:  COREG  
TAKE ONE TABLET BY MOUTH TWICE A DAY WITH MEALS  
  
 ibuprofen 800 mg tablet Commonly known as:  MOTRIN Take 1 Tab by mouth every six (6) hours as needed for Pain. Indications: HEADACHE DISORDER, PAIN  
  
 inhalational spacing device 1 Each by Does Not Apply route as needed. levothyroxine 100 mcg tablet Commonly known as:  SYNTHROID Take 1 Tab by mouth Daily (before breakfast). losartan 100 mg tablet Commonly known as:  COZAAR  
TAKE ONE TABLET BY MOUTH DAILY Nebulizer & Compressor machine 1 Each by Does Not Apply route every four (4) hours. nitroglycerin 0.4 mg SL tablet Commonly known as:  NITROSTAT  
1 Tab by SubLINGual route every five (5) minutes as needed for Chest Pain.  
  
 pravastatin 40 mg tablet Commonly known as:  PRAVACHOL  
TAKE ONE TABLET BY MOUTH DAILY  
  
 * PROAIR HFA 90 mcg/actuation inhaler Generic drug:  albuterol INHALE TWO PUFFS BY MOUTH EVERY 4 HOURS AS NEEDED FOR WHEEZING  
  
 * albuterol 1.25 mg/3 mL Nebu Commonly known as:  Junaid Form Take 3 mL by inhalation every four (4) hours as needed (wheezing). PULMICORT FLEXHALER 180 mcg/actuation Aepb inhaler Generic drug:  budesonide INHALE TWO PUFFS BY MOUTH TWICE A DAY  
  
 traZODone 50 mg tablet Commonly known as:  DESYREL  
TAKE ONE TABLET BY MOUTH EVERY NIGHT AT BEDTIME AS NEEDED FOR SLEEP  
  
 TUDORZA PRESSAIR 400 mcg/actuation inhaler Generic drug:  aclidinium bromide INHALE ONE DOSE BY MOUTH TWICE A DAY * Notice: This list has 2 medication(s) that are the same as other medications prescribed for you. Read the directions carefully, and ask your doctor or other care provider to review them with you. We Performed the Following CBC W/O DIFF [25758 CPT(R)] MAGNESIUM [id number] CPT(R)] METABOLIC PANEL, BASIC [92171 CPT(R)] TSH 3RD GENERATION [57837 CPT(R)] Follow-up Instructions Return in about 1 month (around 6/30/2017). Introducing [facility]! Renate Winslow introduces [employer] patient portal. Now you can access parts of your medical record, email your doctor's office, and request medication refills online. 1. In your internet browser, go to [email or web address] 2. Click on the First Time User? Click Here link in the Sign In box. You will see the New Member Sign Up page. 3. Enter your [employer] Access Code exactly as it appears below. You will not need to use this code after youve completed the sign-up process. If you do not sign up before the expiration date, you must request a new code. · [employer] Access Code: [email or web address] Expires: 7/18/2017 10:39 AM 
 
4. Enter the last four digits of your Social Security Number (xxxx) and Date of Birth (mm/dd/yyyy) as indicated and click Submit. You will be taken to the next sign-up page. 5. Create a Next Level Security Systemst ID. This will be your Ever[employer] login ID and cannot be changed, so think of one that is secure and easy to remember. 6. Create a Next [employer] System[employer] password. You can change your password at any time. 7. Enter your Password Reset Question and Answer. This can be used at a later time if you forget your password. 8. Enter your e-mail address. You will receive e-mail notification when new information is available in [address] 9. Click Sign Up. You can now view and download portions of your medical record. 10. Click the Download Summary menu link to download a portable copy of your medical information. If you have questions, please visit the Frequently Asked Questions section of the [employer] website. Remember, [employer] is NOT to be used for urgent needs. For medical emergencies, dial 911. Now available from your iPhone and Android! Please provide this summary of care documentation to your next provider. Your primary care clinician is listed as Jalen Irwin. If you have any questions after today's visit, please call [phone].

## 2017-06-01 LAB
BUN SERPL-MCNC: 12 MG/DL (ref 8–27)
BUN/CREAT SERPL: 15 (ref 10–24)
CALCIUM SERPL-MCNC: 9.3 MG/DL (ref 8.6–10.2)
CHLORIDE SERPL-SCNC: 99 MMOL/L (ref 96–106)
CO2 SERPL-SCNC: 25 MMOL/L (ref 18–29)
CREAT SERPL-MCNC: 0.82 MG/DL (ref 0.76–1.27)
ERYTHROCYTE [DISTWIDTH] IN BLOOD BY AUTOMATED COUNT: 13.7 % (ref 12.3–15.4)
GLUCOSE SERPL-MCNC: 79 MG/DL (ref 65–99)
HCT VFR BLD AUTO: 41.2 % (ref 37.5–51)
HCV AB S/CO SERPL IA: <0.1 S/CO RATIO (ref 0–0.9)
HGB BLD-MCNC: 13.9 G/DL (ref 12.6–17.7)
MAGNESIUM SERPL-MCNC: 2.1 MG/DL (ref 1.6–2.3)
MCH RBC QN AUTO: 31.5 PG (ref 26.6–33)
MCHC RBC AUTO-ENTMCNC: 33.7 G/DL (ref 31.5–35.7)
MCV RBC AUTO: 93 FL (ref 79–97)
PLATELET # BLD AUTO: 193 X10E3/UL (ref 150–379)
POTASSIUM SERPL-SCNC: 4.5 MMOL/L (ref 3.5–5.2)
RBC # BLD AUTO: 4.41 X10E6/UL (ref 4.14–5.8)
SODIUM SERPL-SCNC: 139 MMOL/L (ref 134–144)
TSH SERPL DL<=0.005 MIU/L-ACNC: 3.81 UIU/ML (ref 0.45–4.5)
WBC # BLD AUTO: 7 X10E3/UL (ref 3.4–10.8)

## 2017-06-28 ENCOUNTER — OFFICE VISIT (OUTPATIENT)
Dept: FAMILY MEDICINE CLINIC | Age: 62
End: 2017-06-28

## 2017-06-28 VITALS
HEART RATE: 53 BPM | SYSTOLIC BLOOD PRESSURE: 126 MMHG | DIASTOLIC BLOOD PRESSURE: 75 MMHG | BODY MASS INDEX: 24.28 KG/M2 | WEIGHT: 155 LBS | OXYGEN SATURATION: 93 % | TEMPERATURE: 98.2 F

## 2017-06-28 DIAGNOSIS — M25.562 CHRONIC PAIN OF BOTH KNEES: ICD-10-CM

## 2017-06-28 DIAGNOSIS — G47.00 INSOMNIA, UNSPECIFIED TYPE: ICD-10-CM

## 2017-06-28 DIAGNOSIS — I10 ESSENTIAL HYPERTENSION: ICD-10-CM

## 2017-06-28 DIAGNOSIS — M25.561 CHRONIC PAIN OF BOTH KNEES: ICD-10-CM

## 2017-06-28 DIAGNOSIS — E03.9 ACQUIRED HYPOTHYROIDISM: ICD-10-CM

## 2017-06-28 DIAGNOSIS — G89.29 CHRONIC PAIN OF BOTH KNEES: ICD-10-CM

## 2017-06-28 DIAGNOSIS — R53.83 FATIGUE, UNSPECIFIED TYPE: Primary | ICD-10-CM

## 2017-06-28 DIAGNOSIS — J44.9 CHRONIC OBSTRUCTIVE PULMONARY DISEASE, UNSPECIFIED COPD TYPE (HCC): ICD-10-CM

## 2017-06-28 RX ORDER — IBUPROFEN 800 MG/1
800 TABLET ORAL
Qty: 90 TAB | Refills: 1 | Status: SHIPPED | OUTPATIENT
Start: 2017-06-28 | End: 2018-02-05 | Stop reason: SDUPTHER

## 2017-06-28 NOTE — MR AVS SNAPSHOT
Visit Information Date & Time Provider Department Dept. Phone Encounter #  
 6/28/2017 11:10 AM Carlos Swift MD [facility] [phone] [id number] Follow-up Instructions Return in about 6 months (around 12/28/2017). Your Appointments 10/18/2017  9:00 AM  
6 MONTH with Terri Carvalho MD  
[place] [facility]) Appt Note: Per Agustin Prakash. $0CP REM  
 [facility] [facility]  
[phone] [address] [facility] Upcoming Health Maintenance Date Due DTaP/Tdap/Td series (1 - Tdap) 9/16/1977 FOBT Q 1 YEAR AGE 50-75 9/16/2006 ZOSTER VACCINE AGE 60> 9/16/2016 INFLUENZA AGE 9 TO ADULT 8/1/2017 Allergies as of 6/28/2017  Review Complete On: 6/28/2017 By: Carlos Swift MD  
  
 Severity Noted Reaction Type Reactions Bupropion  12/28/2015    Other (comments) Urinary frequency and flatulence. Chantix [Varenicline]  03/03/2014    Other (comments) Trouble sleeping, soreness of mouth Codeine  05/05/2012    Itching Fluticasone  10/28/2014    Shortness of Breath, Swelling  
 possibly due to Nasonex [Mometasone]  12/29/2014   Side Effect Other (comments)  
 swelling of throat and hands possibly due to Current Immunizations  Reviewed on 1/18/2017 Name Date Influenza Vaccine (Quad) PF 10/19/2016, 11/16/2015 Influenza Vaccine PF 10/28/2014 Influenza Vaccine Split  Deferred (Patient Refused) Pneumococcal Polysaccharide (PPSV-23) 10/12/2015 Not reviewed this visit You Were Diagnosed With   
  
 Codes Comments Fatigue, unspecified type    -  Primary ICD-10-CM: R53.83 ICD-9-CM: 780.79 Chronic pain of both knees     ICD-10-CM: M25.561, M25.562, G89.29 ICD-9-CM: 719.46, 338.29 Insomnia, unspecified type     ICD-10-CM: G47.00 ICD-9-CM: 780.52 Essential hypertension     ICD-10-CM: I10 
ICD-9-CM: 401.9 Acquired hypothyroidism     ICD-10-CM: E03.9 ICD-9-CM: 170. 9 Chronic obstructive pulmonary disease, unspecified COPD type ([facility]     ICD-10-CM: J44.9 ICD-9-CM: 876 Vitals BP Pulse Temp Weight(growth percentile) SpO2 BMI  
 126/75 (BP 1 Location: Right arm, BP Patient Position: Sitting) (!) 53 98.2 °F (36.8 °C) (Oral) 155 lb (70.3 kg) 93% 24.28 kg/m2 Smoking Status Former Smoker Vitals History BMI and BSA Data Body Mass Index Body Surface Area  
 24.28 kg/m 2 1.82 m 2 Preferred Pharmacy Pharmacy Name Phone [facility] [address] , [address] [facility] [phone] Your Updated Medication List  
  
   
This list is accurate as of: 6/28/17 12:17 PM.  Always use your most recent med list.  
  
  
  
  
 aspirin delayed-release 81 mg tablet Take 1 Tab by mouth daily. carvedilol 3.125 mg tablet Commonly known as:  COREG  
TAKE ONE TABLET BY MOUTH TWICE A DAY WITH MEALS  
  
 ibuprofen 800 mg tablet Commonly known as:  MOTRIN Take 1 Tab by mouth every eight (8) hours as needed for Pain. Indications: HEADACHE DISORDER, Pain  
  
 inhalational spacing device 1 Each by Does Not Apply route as needed. levothyroxine 100 mcg tablet Commonly known as:  SYNTHROID Take 1 Tab by mouth Daily (before breakfast). losartan 100 mg tablet Commonly known as:  COZAAR  
TAKE ONE TABLET BY MOUTH DAILY Nebulizer & Compressor machine 1 Each by Does Not Apply route every four (4) hours. nitroglycerin 0.4 mg SL tablet Commonly known as:  NITROSTAT  
1 Tab by SubLINGual route every five (5) minutes as needed for Chest Pain.  
  
 pravastatin 40 mg tablet Commonly known as:  PRAVACHOL  
TAKE ONE TABLET BY MOUTH DAILY  
  
 * PROAIR HFA 90 mcg/actuation inhaler Generic drug:  albuterol INHALE TWO PUFFS BY MOUTH EVERY 4 HOURS AS NEEDED FOR WHEEZING  
  
 * albuterol 1.25 mg/3 mL Nebu Commonly known as:  Mccarthy Netter Take 3 mL by inhalation every four (4) hours as needed (wheezing). PULMICORT FLEXHALER 180 mcg/actuation Aepb inhaler Generic drug:  budesonide INHALE TWO PUFFS BY MOUTH TWICE A DAY  
  
 traZODone 50 mg tablet Commonly known as:  DESYREL  
TAKE ONE TABLET BY MOUTH EVERY NIGHT AT BEDTIME AS NEEDED FOR SLEEP  
  
 TUDORZA PRESSAIR 400 mcg/actuation inhaler Generic drug:  aclidinium bromide INHALE ONE DOSE BY MOUTH TWICE A DAY * Notice: This list has 2 medication(s) that are the same as other medications prescribed for you. Read the directions carefully, and ask your doctor or other care provider to review them with you. Prescriptions Sent to Pharmacy Refills  
 ibuprofen (MOTRIN) 800 mg tablet 1 Sig: Take 1 Tab by mouth every eight (8) hours as needed for Pain. Indications: HEADACHE DISORDER, Pain Class: Normal  
 Pharmacy: Ricki Resendez [address] [place] [address] #: [phone] Route: Oral  
  
Follow-up Instructions Return in about 6 months (around 12/28/2017). Introducing [facility]! [employer] introduces [employer] patient portal. Now you can access parts of your medical record, email your doctor's office, and request medication refills online. 1. In your internet browser, go to [email or web address] ([email or web address] 2. Click on the First Time User? Click Here link in the Sign In box. You will see the New Member Sign Up page. 3. Enter your [employer] Access Code exactly as it appears below. You will not need to use this code after youve completed the sign-up process. If you do not sign up before the expiration date, you must request a new code. · [employer] Access Code: [email or web address] Expires: 7/18/2017 10:39 AM 
 
4. Enter the last four digits of your Social Security Number (xxxx) and Date of Birth (mm/dd/yyyy) as indicated and click Submit. You will be taken to the next sign-up page. 5. Create a [employer] ID. This will be your [employer] login ID and cannot be changed, so think of one that is secure and easy to remember. 6. Create a [employer] password. You can change your password at any time. 7. Enter your Password Reset Question and Answer. This can be used at a later time if you forget your password. 8. Enter your e-mail address. You will receive e-mail notification when new information is available in [address] 9. Click Sign Up. You can now view and download portions of your medical record. 10. Click the Download Summary menu link to download a portable copy of your medical information. If you have questions, please visit the Frequently Asked Questions section of the [employer] website. Remember, [employer] is NOT to be used for urgent needs. For medical emergencies, dial 911. Now available from your iPhone and Android! Please provide this summary of care documentation to your next provider. Your primary care clinician is listed as Edilma Pina. If you have any questions after today's visit, please call [phone].

## 2017-06-28 NOTE — PROGRESS NOTES
Coordination of Care  1. Have you been to the ER, urgent care clinic since your last visit? Hospitalized since your last visit? No    2. Have you seen or consulted any other health care providers outside of the [address] [place] [address] since your last visit? Include any pap smears or colon screening. No    Medications  Medication Reconciliation Performed: no  Patient does need refills     Learning Assessment Complete?  yes

## 2017-06-28 NOTE — PROGRESS NOTES
Subjective:     Tarun Coto is a 62 y.o. male who presents for follow up of hypertension, hyperlipidemia and coronary artery disease. He continues to have episodes of fatigue. These episodes seem to come intermittently. Some days he is fine but some days he will notice acute onset of fatigue and feel that he needs to rest.  He does not always feel sleepy but will lie down and rest without going to sleep he says and then feels better. Feels that he needs to rest for several hours sometimes. He admits that he often skips breakfast.  Drinks several cups of coffee during the day but denies any caffeine intake in the evening. States that he has to get up several times per night to urinate but goes back to sleep easily. Reports that he urinates a normal amount each time he goes. Denies any decrease in urinary stream or any need to strain. He says he has never been accused of snoring and is not aware of any sleep apnea symptoms but has not slept with anybody in recent years. He awakens in the morning fairly refreshed. Does not usually fall asleep during the day but does report that when he was driving long periods in the past that he would often feel very sleepy and have to stop. Would like a refill on ibuprofen today for his various aches and pains. He notes these in the knees and back. Taking occasional ibuprofen really helps. Since the last visit he has not had any chest pains noted. He continues to state that he is no longer smoking. Was praised once again for this.     Patient Active Problem List   Diagnosis Code    STEMI (ST elevation myocardial infarction) ([email or web address] I21.3    Tobacco use Z72.0    S/P coronary artery stent placement Z95.5    Dyslipidemia E78.5    Coronary atherosclerosis of native coronary artery I25.10    Exercise induced bronchospasm J45.990    Old myocardial infarction I25.2    COPD (chronic obstructive pulmonary disease) ([facility]) J44.9    Insomnia G47.00    Unstable angina pectoris (HCC) I20.0    Essential hypertension I10    ASHD (arteriosclerotic heart disease) I25.10    Acquired hypothyroidism E03.9       Current Outpatient Prescriptions   Medication Sig    carvedilol (COREG) 3.125 mg tablet TAKE ONE TABLET BY MOUTH TWICE A DAY WITH MEALS    TUDORZA PRESSAIR 400 mcg/actuation inhaler INHALE ONE DOSE BY MOUTH TWICE A DAY    traZODone (DESYREL) 50 mg tablet TAKE ONE TABLET BY MOUTH EVERY NIGHT AT BEDTIME AS NEEDED FOR SLEEP    inhalational spacing device 1 Each by Does Not Apply route as needed.  Nebulizer & Compressor machine 1 Each by Does Not Apply route every four (4) hours.  albuterol (ACCUNEB) 1.25 mg/3 mL nebu Take 3 mL by inhalation every four (4) hours as needed (wheezing).  pravastatin (PRAVACHOL) 40 mg tablet TAKE ONE TABLET BY MOUTH DAILY    ibuprofen (MOTRIN) 800 mg tablet Take 1 Tab by mouth every six (6) hours as needed for Pain. Indications: HEADACHE DISORDER, PAIN    losartan (COZAAR) 100 mg tablet TAKE ONE TABLET BY MOUTH DAILY    PULMICORT FLEXHALER 180 mcg/actuation aepb inhaler INHALE TWO PUFFS BY MOUTH TWICE A DAY    levothyroxine (SYNTHROID) 100 mcg tablet Take 1 Tab by mouth Daily (before breakfast).  PROAIR HFA 90 mcg/actuation inhaler INHALE TWO PUFFS BY MOUTH EVERY 4 HOURS AS NEEDED FOR WHEEZING    aspirin delayed-release 81 mg tablet Take 1 Tab by mouth daily.  nitroglycerin (NITROSTAT) 0.4 mg SL tablet 1 Tab by SubLINGual route every five (5) minutes as needed for Chest Pain. No current facility-administered medications for this visit. Allergies   Allergen Reactions    Bupropion Other (comments)     Urinary frequency and flatulence.     Chantix [Varenicline] Other (comments)     Trouble sleeping, soreness of mouth    Codeine Itching    Fluticasone Shortness of Breath and Swelling     possibly due to    Nasonex [Mometasone] Other (comments)     swelling of throat and hands possibly due to       Diet and Lifestyle: generally follows a low sodium diet      BP readings outside office:  Not checed. Cardiovascular ROS: taking medications as instructed, no medication side effects noted, no TIA's, no chest pain on exertion, no dyspnea on exertion, no swelling of ankles. Review of Systems, additional:  Pertinent items are noted in HPI. Past Surgical History:   Procedure Laterality Date    CARDIAC SURG PROCEDURE UNLIST      stents may 2012     HX CATARACT REMOVAL Left 02/02/2016     Family History   Problem Relation Age of Onset    Cancer Mother      lung cancer    Heart Disease Father     Thyroid Disease Brother     Thyroid Disease Maternal Aunt      Social History   Substance Use Topics    Smoking status: Former Smoker     Packs/day: 1.00     Years: 38.00     Types: Cigarettes     Quit date: 3/17/2017    Smokeless tobacco: Never Used      Comment: Tried Chantix and had side effects. Quit 3/2017.  Alcohol use No          Objective:     Visit Vitals    /75 (BP 1 Location: Right arm, BP Patient Position: Sitting)    Pulse (!) 53    Temp 98.2 °F (36.8 °C) (Oral)    Wt 155 lb (70.3 kg)    SpO2 93%    BMI 24.28 kg/m2     Body mass index is 24.28 kg/(m^2).     Physical Exam  General appearance: alert, cooperative, no distress, appears stated age    Lungs: clear to auscultation bilaterally, no wheezes, no increased work of breathing  Heart: regular rate and rhythm, S1, S2 normal, no murmur, click, rub or gallop  Extremities: extremities normal, no cyanosis or edema  Skin: color, texture, turgor normal. No rashes or lesions  Neurologic: Alert and oriented, grossly normal exam. Normal coordination and gait      Lab results below reviewed at this visit:  Results for orders placed or performed in visit on 17/33/74   METABOLIC PANEL, BASIC   Result Value Ref Range    Glucose 79 65 - 99 mg/dL    BUN 12 8 - 27 mg/dL    Creatinine 0.82 0.76 - 1.27 mg/dL    GFR est non-AA 96 >59 mL/min/1.73    GFR est  >59 mL/min/1.73    BUN/Creatinine ratio 15 10 - 24    Sodium 139 134 - 144 mmol/L    Potassium 4.5 3.5 - 5.2 mmol/L    Chloride 99 96 - 106 mmol/L    CO2 25 18 - 29 mmol/L    Calcium 9.3 8.6 - 10.2 mg/dL   TSH 3RD GENERATION   Result Value Ref Range    TSH 3.810 0.450 - 4.500 uIU/mL   CBC W/O DIFF   Result Value Ref Range    WBC 7.0 3.4 - 10.8 x10E3/uL    RBC 4.41 4.14 - 5.80 x10E6/uL    HGB 13.9 12.6 - 17.7 g/dL    HCT 41.2 37.5 - 51.0 %    MCV 93 79 - 97 fL    MCH 31.5 26.6 - 33.0 pg    MCHC 33.7 31.5 - 35.7 g/dL    RDW 13.7 12.3 - 15.4 %    PLATELET 625 967 - 510 x10E3/uL   MAGNESIUM   Result Value Ref Range    Magnesium 2.1 1.6 - 2.3 mg/dL   HEPATITIS C AB   Result Value Ref Range    Hep C Virus Ab <0.1 0.0 - 0.9 s/co ratio       Lab Results   Component Value Date/Time    Cholesterol, total 102 06/02/2015 04:16 AM    HDL Cholesterol 38 06/02/2015 04:16 AM    LDL, calculated 33.6 06/02/2015 04:16 AM    VLDL, calculated 30.4 06/02/2015 04:16 AM    Triglyceride 152 06/02/2015 04:16 AM    CHOL/HDL Ratio 2.7 06/02/2015 04:16 AM         Assessment/Plan:       ICD-10-CM ICD-9-CM    1. Fatigue, unspecified type R53.83 780.79    2. Chronic pain of both knees M25.561 719.46 ibuprofen (MOTRIN) 800 mg tablet    M25.562 338.29     G89.29     3. Insomnia, unspecified type G47.00 780.52    4. Essential hypertension I10 401.9    5. Acquired hypothyroidism E03.9 244.9    6. Chronic obstructive pulmonary disease, unspecified COPD type [facility] J44.9 496        I reviewed his previous lab work with him which was all normal.  He was somewhat surprised to hear that nothing was abnormal.    Recommend he try to eat breakfast and not skip meals, decrease caffeine intake somewhat during the day, and avoid caffeine and any excess liquids in the evening to try to prevent nocturia. If he continues to have episodes of significant fatigue during the day he should return.   Sleep apnea is a possibility that may need to be investigated. Follow-up Disposition:  Return in about 6 months (around 12/28/2017).

## 2017-08-06 DIAGNOSIS — E03.9 ACQUIRED HYPOTHYROIDISM: ICD-10-CM

## 2017-08-08 RX ORDER — LEVOTHYROXINE SODIUM 100 UG/1
TABLET ORAL
Qty: 30 TAB | Refills: 10 | Status: SHIPPED | OUTPATIENT
Start: 2017-08-08 | End: 2018-06-04 | Stop reason: SDUPTHER

## 2017-08-23 RX ORDER — ALBUTEROL SULFATE 90 UG/1
AEROSOL, METERED RESPIRATORY (INHALATION)
Refills: 4 | OUTPATIENT
Start: 2017-08-23

## 2017-08-23 RX ORDER — ALBUTEROL SULFATE 90 UG/1
2 AEROSOL, METERED RESPIRATORY (INHALATION)
Qty: 1 INHALER | Refills: 5 | Status: SHIPPED | OUTPATIENT
Start: 2017-08-23 | End: 2018-05-29 | Stop reason: SDUPTHER

## 2017-10-04 RX ORDER — TRAZODONE HYDROCHLORIDE 50 MG/1
TABLET ORAL
Qty: 30 TAB | Refills: 2 | Status: SHIPPED | OUTPATIENT
Start: 2017-10-04 | End: 2018-01-05 | Stop reason: SDUPTHER

## 2017-11-08 NOTE — TELEPHONE ENCOUNTER
Surescript received for refill request of Jose Sanders 400 MCG f/s [facility], LOV 6/28 with Dr Guerda Akhtar.

## 2017-11-29 ENCOUNTER — OFFICE VISIT (OUTPATIENT)
Dept: CARDIOLOGY CLINIC | Age: 62
End: 2017-11-29

## 2017-11-29 VITALS
RESPIRATION RATE: 16 BRPM | HEIGHT: 67 IN | DIASTOLIC BLOOD PRESSURE: 76 MMHG | BODY MASS INDEX: 23.62 KG/M2 | OXYGEN SATURATION: 97 % | WEIGHT: 150.5 LBS | SYSTOLIC BLOOD PRESSURE: 118 MMHG | HEART RATE: 67 BPM

## 2017-11-29 DIAGNOSIS — I25.10 ASHD (ARTERIOSCLEROTIC HEART DISEASE): Primary | ICD-10-CM

## 2017-11-29 DIAGNOSIS — E78.5 DYSLIPIDEMIA: ICD-10-CM

## 2017-11-29 DIAGNOSIS — I10 ESSENTIAL HYPERTENSION: ICD-10-CM

## 2017-11-29 DIAGNOSIS — Z95.5 S/P CORONARY ARTERY STENT PLACEMENT: ICD-10-CM

## 2017-11-29 RX ORDER — NITROGLYCERIN 0.4 MG/1
0.4 TABLET SUBLINGUAL
Qty: 1 BOTTLE | Refills: 11 | Status: SHIPPED | OUTPATIENT
Start: 2017-11-29

## 2017-11-29 NOTE — PROGRESS NOTES
1. Have you been to the ER, urgent care clinic since your last visit? Hospitalized since your last visit? No    2. Have you seen or consulted any other health care providers outside of the [address] [place] [address] since your last visit? Include any pap smears or colon screening.  No    Chief Complaint   Patient presents with    Cholesterol Problem     6 mo f/u    Hypertension     \"

## 2017-11-29 NOTE — PROGRESS NOTES
Imani Woodard MD          NAME:  Lizandro Lux   :   1955   MRN:   [id number]   PCP:  Francine Allen MD           Subjective: The patient is a 65y.o. year old male  who returns for a routine follow-up. Since the last visit, patient reports no change in exercise tolerance, chest pain, edema, medication intolerance, palpitations, shortness of breath, PND/orthopnea wheezing, sputum, syncope, dizziness or light headedness. Doing well. Past Medical History:   Diagnosis Date    Acute myocardial infarction, unspecified site, episode of care unspecified 2013    CAD (coronary artery disease)     Chest pain 10/15/2014    COPD (chronic obstructive pulmonary disease) (HCC)     Coronary atherosclerosis of native coronary artery 2013    Exercise induced bronchospasm 2013    Hypertension     Hyperthyroidism     Old myocardial infarction 2013    Tobacco use 2012        ICD-10-CM ICD-9-CM    1. ASHD (arteriosclerotic heart disease) I25.10 414.00    2. Dyslipidemia E78.5 272.4 AMB POC EKG ROUTINE W/ 12 LEADS, INTER & REP   3. Essential hypertension I10 401.9    4. S/P coronary artery stent placement Z95.5 V45.82       Social History   Substance Use Topics    Smoking status: Current Every Day Smoker     Packs/day: 1.00     Years: 38.00     Types: Cigarettes     Last attempt to quit: 3/17/2017    Smokeless tobacco: Never Used      Comment: Tried Chantix and had side effects. Quit 3/2017.     Alcohol use No      Family History   Problem Relation Age of Onset    Cancer Mother      lung cancer    Heart Disease Father     Thyroid Disease Brother     Thyroid Disease Maternal Aunt         Review of Systems  Cardiovascular: Negative except as noted in HPI      Objective:       Vitals:    17 1047 17 1100   BP: 122/72 118/76   Pulse: 67    Resp: 16    SpO2: 97%    Weight: 150 lb 8 oz (68.3 kg)    Height: 5' 7\" (1.702 m)     Body mass index is 23.57 kg/(m^2). General PE  Mental Status - Alert. General Appearance - Not in acute distress. Chest and Lung Exam   Inspection: Accessory muscles - No use of accessory muscles in breathing. Auscultation:   Breath sounds: - Normal.    Cardiovascular   Inspection: Jugular vein - Bilateral - Inspection Normal.  Palpation/Percussion:   Apical Impulse: - Normal.  Auscultation: Rhythm - Regular. Heart Sounds - S1 WNL and S2 WNL. No S3 or S4. Murmurs & Other Heart Sounds: Auscultation of the heart reveals - No Murmurs. Peripheral Vascular   Upper Extremity: Inspection - Bilateral - No Cyanotic nailbeds or Digital clubbing. Lower Extremity:   Palpation: Edema - Bilateral - No edema. Data Review:     EKG -  EKG: normal sinus rhythm, old inf MI. No change. LABS- @brieflabs@      Allergies reviewed  Allergies   Allergen Reactions    Bupropion Other (comments)     Urinary frequency and flatulence.  Chantix [Varenicline] Other (comments)     Trouble sleeping, soreness of mouth    Codeine Itching    Fluticasone Shortness of Breath and Swelling     possibly due to    Nasonex [Mometasone] Other (comments)     swelling of throat and hands possibly due to       Medications reviewed  Current Outpatient Prescriptions   Medication Sig    traZODone (DESYREL) 50 mg tablet TAKE ONE TABLET BY MOUTH EVERY NIGHT AT BEDTIME AS NEEDED FOR SLEEP    albuterol (PROAIR HFA) 90 mcg/actuation inhaler Take 2 Puffs by inhalation every six (6) hours as needed for Wheezing.  levothyroxine (SYNTHROID) 100 mcg tablet TAKE ONE TABLET BY MOUTH DAILY BEFORE BREAKFAST    ibuprofen (MOTRIN) 800 mg tablet Take 1 Tab by mouth every eight (8) hours as needed for Pain.  Indications: HEADACHE DISORDER, Pain    carvedilol (COREG) 3.125 mg tablet TAKE ONE TABLET BY MOUTH TWICE A DAY WITH MEALS    pravastatin (PRAVACHOL) 40 mg tablet TAKE ONE TABLET BY MOUTH DAILY    losartan (COZAAR) 100 mg tablet TAKE ONE TABLET BY MOUTH DAILY    PULMICORT FLEXHALER 180 mcg/actuation aepb inhaler INHALE TWO PUFFS BY MOUTH TWICE A DAY    aspirin delayed-release 81 mg tablet Take 1 Tab by mouth daily.  nitroglycerin (NITROSTAT) 0.4 mg SL tablet 1 Tab by SubLINGual route every five (5) minutes as needed for Chest Pain.  aclidinium bromide (TUDORZA PRESSAIR) 400 mcg/actuation inhaler INHALE ONE DOSE BY MOUTH TWICE A DAY    inhalational spacing device 1 Each by Does Not Apply route as needed.  Nebulizer & Compressor machine 1 Each by Does Not Apply route every four (4) hours.  albuterol (ACCUNEB) 1.25 mg/3 mL nebu Take 3 mL by inhalation every four (4) hours as needed (wheezing). No current facility-administered medications for this visit. Assessment:       ICD-10-CM ICD-9-CM    1. ASHD (arteriosclerotic heart disease) I25.10 414.00    2. Dyslipidemia E78.5 272.4 AMB POC EKG ROUTINE W/ 12 LEADS, INTER & REP   3. Essential hypertension I10 401.9    4. S/P coronary artery stent placement Z95.5 V45.82         Orders Placed This Encounter    AMB POC EKG ROUTINE W/ 12 LEADS, INTER & REP     Order Specific Question:   Reason for Exam:     Answer:   Routine       Plan:     Asymptomatic. BP at target. EKG OK. Lipids followed by PCP are were fine per pt.   F/U 1 year    César Hdz MD

## 2017-11-29 NOTE — MR AVS SNAPSHOT
Visit Information Date & Time Provider Department Dept. Phone Encounter #  
 11/29/2017 10:45 AM Ankit Miller, [address] [facility] [phone] Your Appointments 12/7/2017 10:30 AM  
ROUTINE CARE with Bridgette Leggett MD  
Merged with [facility]) Appt Note: fu  3 mos 651 [address]  
  
   
 [address] [facility] Upcoming Health Maintenance Date Due DTaP/Tdap/Td series (1 - Tdap) 9/16/1977 FOBT Q 1 YEAR AGE 50-75 9/16/2006 Allergies as of 11/29/2017  Review Complete On: 11/29/2017 By: Jennifer Thomas Severity Noted Reaction Type Reactions Bupropion  12/28/2015    Other (comments) Urinary frequency and flatulence. Chantix [Varenicline]  03/03/2014    Other (comments) Trouble sleeping, soreness of mouth Codeine  05/05/2012    Itching Fluticasone  10/28/2014    Shortness of Breath, Swelling  
 possibly due to Nasonex [Mometasone]  12/29/2014   Side Effect Other (comments)  
 swelling of throat and hands possibly due to Current Immunizations  Reviewed on 1/18/2017 Name Date Influenza Vaccine 10/21/2017 Influenza Vaccine (Quad) PF 10/19/2016, 11/16/2015 Influenza Vaccine PF 10/28/2014 Influenza Vaccine Split  Deferred (Patient Refused) Pneumococcal Conjugate (PCV-13) 10/21/2017 Pneumococcal Polysaccharide (PPSV-23) 10/12/2015 Varicella Virus Vaccine 10/21/2017 Not reviewed this visit You Were Diagnosed With   
  
 Codes Comments ASHD (arteriosclerotic heart disease)    -  Primary ICD-10-CM: I25.10 ICD-9-CM: 414.00 Dyslipidemia     ICD-10-CM: E78.5 ICD-9-CM: 272.4 Essential hypertension     ICD-10-CM: I10 
ICD-9-CM: 401.9 S/P coronary artery stent placement     ICD-10-CM: Z95.5 ICD-9-CM: V45.82 Vitals BP Pulse Resp Height(growth percentile) Weight(growth percentile) SpO2  
 118/76 (BP 1 Location: Left arm, BP Patient Position: Sitting) 67 16 5' 7\" (1.702 m) 150 lb 8 oz (68.3 kg) 97% BMI Smoking Status 23.57 kg/m2 Current Every Day Smoker Vitals History BMI and BSA Data Body Mass Index Body Surface Area  
 23.57 kg/m 2 1.8 m 2 Preferred Pharmacy Pharmacy Name Phone [facility]. [phone] [facility] [phone] Your Updated Medication List  
  
   
This list is accurate as of: 11/29/17 11:19 AM.  Always use your most recent med list.  
  
  
  
  
 aclidinium bromide 400 mcg/actuation inhaler Commonly known as:  TUDORZA PRESSAIR  
INHALE ONE DOSE BY MOUTH TWICE A DAY  
  
 * albuterol 1.25 mg/3 mL Nebu Commonly known as:  Lonni Monroy Take 3 mL by inhalation every four (4) hours as needed (wheezing). * albuterol 90 mcg/actuation inhaler Commonly known as:  PROAIR HFA Take 2 Puffs by inhalation every six (6) hours as needed for Wheezing. aspirin delayed-release 81 mg tablet Take 1 Tab by mouth daily. carvedilol 3.125 mg tablet Commonly known as:  COREG  
TAKE ONE TABLET BY MOUTH TWICE A DAY WITH MEALS  
  
 ibuprofen 800 mg tablet Commonly known as:  MOTRIN Take 1 Tab by mouth every eight (8) hours as needed for Pain. Indications: HEADACHE DISORDER, Pain  
  
 inhalational spacing device 1 Each by Does Not Apply route as needed. levothyroxine 100 mcg tablet Commonly known as:  SYNTHROID  
TAKE ONE TABLET BY MOUTH DAILY BEFORE BREAKFAST  
  
 losartan 100 mg tablet Commonly known as:  COZAAR  
TAKE ONE TABLET BY MOUTH DAILY Nebulizer & Compressor machine 1 Each by Does Not Apply route every four (4) hours. nitroglycerin 0.4 mg SL tablet Commonly known as:  NITROSTAT  
1 Tab by SubLINGual route every five (5) minutes as needed for Chest Pain.  
  
 pravastatin 40 mg tablet Commonly known as:  PRAVACHOL  
TAKE ONE TABLET BY MOUTH DAILY PULMICORT FLEXHALER 180 mcg/actuation Aepb inhaler Generic drug:  budesonide INHALE TWO PUFFS BY MOUTH TWICE A DAY  
  
 traZODone 50 mg tablet Commonly known as:  DESYREL  
TAKE ONE TABLET BY MOUTH EVERY NIGHT AT BEDTIME AS NEEDED FOR SLEEP * Notice: This list has 2 medication(s) that are the same as other medications prescribed for you. Read the directions carefully, and ask your doctor or other care provider to review them with you. We Performed the Following AMB POC EKG ROUTINE W/ 12 LEADS, INTER & REP [45603 CPT(R)] Introducing [facility]! Maricruz Morin introduces [facility] patient portal. Now you can access parts of your medical record, email your doctor's office, and request medication refills online. 1. In your internet browser, go to [email or web address] 2. Click on the First Time User? Click Here link in the Sign In box. You will see the New Member Sign Up page. 3. Enter your [facility] Access Code exactly as it appears below. You will not need to use this code after youve completed the sign-up process. If you do not sign up before the expiration date, you must request a new code. · [facility] Access Code: [email or web address] Expires: 2/27/2018 11:19 AM 
 
4. Enter the last four digits of your Social Security Number (xxxx) and Date of Birth (mm/dd/yyyy) as indicated and click Submit. You will be taken to the next sign-up page. 5. Create a I Do Now I Don'tt ID. This will be your [facility] login ID and cannot be changed, so think of one that is secure and easy to remember. 6. Create a [facility] password. You can change your password at any time. 7. Enter your Password Reset Question and Answer. This can be used at a later time if you forget your password. 8. Enter your e-mail address. You will receive e-mail notification when new information is available in [address] 9. Click Sign Up. You can now view and download portions of your medical record. 10. Click the Download Summary menu link to download a portable copy of your medical information. If you have questions, please visit the Frequently Asked Questions section of the [employer] website. Remember, [facility] is NOT to be used for urgent needs. For medical emergencies, dial 911. Now available from your iPhone and Android! Please provide this summary of care documentation to your next provider. Your primary care clinician is listed as Jaxon Washburn. If you have any questions after today's visit, please call [phone].

## 2017-12-07 ENCOUNTER — OFFICE VISIT (OUTPATIENT)
Dept: FAMILY MEDICINE CLINIC | Age: 62
End: 2017-12-07

## 2017-12-07 VITALS
BODY MASS INDEX: 23.34 KG/M2 | WEIGHT: 149 LBS | HEART RATE: 57 BPM | TEMPERATURE: 98.4 F | SYSTOLIC BLOOD PRESSURE: 152 MMHG | DIASTOLIC BLOOD PRESSURE: 83 MMHG

## 2017-12-07 DIAGNOSIS — I25.10 CORONARY ARTERY DISEASE INVOLVING NATIVE CORONARY ARTERY OF NATIVE HEART WITHOUT ANGINA PECTORIS: Primary | ICD-10-CM

## 2017-12-07 NOTE — PROGRESS NOTES
Coordination of Care  1. Have you been to the ER, urgent care clinic since your last visit? Hospitalized since your last visit? No    2. Have you seen or consulted any other health care providers outside of the [address] [place] [address] since your last visit? Include any pap smears or colon screening. No    Medications  Does the patient need refills? NO    Learning Assessment Complete?  yes

## 2017-12-07 NOTE — PROGRESS NOTES
HISTORY OF PRESENT ILLNESS  Evette Patterson is a 65 y.o. male. HPI Comments: 1. Rash for the last several months RLE, itchy, antifungal powder didn't help. No h/o tick bite. 2.  Insomnia-- reports trazedone plus 800mg ibuprofen help, but not as much as in the past.  Wonders if he should try a difft med. Reports 3 close friends have  in the last 3 months, 1 was his best friend for 26 years. Doesn't think this is contrib to the insomnia and doesn't feel depressed. 3.  CAD  Just saw cardiology. Started back tobacco use when his friends . Rare sscp, avoids ntg use because it gave him a bad h/a when nitropaste was used during a cath. Walks at least 20min at least 4x a week. 4.  Hypothyroidism-- tsh nl . 5.  COPD-- isn't using albuterol daily, is on pulmicort bid. Follow-up     Rash          Review of Systems   Skin: Positive for rash. Physical Exam   Constitutional: He appears well-developed and well-nourished. No distress. Neck: No thyromegaly present. Cardiovascular: Normal rate, regular rhythm and normal heart sounds. Exam reveals no gallop and no friction rub. No murmur heard. No carotid or abd bruits. Pulmonary/Chest: No respiratory distress. Scattered wheezes. Abdominal: Soft. He exhibits no mass. There is no tenderness. Musculoskeletal: He exhibits no edema. Skin:   Faint pinkish ring with central clearing and no scale, lower right calf.  2cm in size. Psychiatric: He has a normal mood and affect. First time I have met him       ASSESSMENT and PLAN  1.  CAD/htn-- check lipids, encouraged him to quit smoking. 2.  COPD-- as above and continue meds. 3.  Insomnia-- discussed that meds like ambien cause physical dependence and need to be avoided. Can try melatonin, gave ideas for lifestyle and could consider CBT through [facility], which he isn't interested in at this point. Ok to continue once daily ibuprofen, check bmp.   4.  Tinea jaciel. Vs granuloma annulare-- clotrimazole and if that doesn't work, 1%hydrocort. 5.  Grief--  Discussed that if sx are worsening instead of improving, needs f/u. Declined counseling today. 6.  Hypothyroidism-- labs in may, earlier if insomnia is worsening or other sx suggestive of over-replacement.

## 2017-12-08 LAB
BUN SERPL-MCNC: 12 MG/DL (ref 8–27)
BUN/CREAT SERPL: 14 (ref 10–24)
CALCIUM SERPL-MCNC: 9.4 MG/DL (ref 8.6–10.2)
CHLORIDE SERPL-SCNC: 97 MMOL/L (ref 96–106)
CHOLEST SERPL-MCNC: 154 MG/DL (ref 100–199)
CO2 SERPL-SCNC: 24 MMOL/L (ref 18–29)
CREAT SERPL-MCNC: 0.84 MG/DL (ref 0.76–1.27)
GFR SERPLBLD CREATININE-BSD FMLA CKD-EPI: 109 ML/MIN/1.73
GFR SERPLBLD CREATININE-BSD FMLA CKD-EPI: 95 ML/MIN/1.73
GLUCOSE SERPL-MCNC: 91 MG/DL (ref 65–99)
HDLC SERPL-MCNC: 42 MG/DL
LDLC SERPL CALC-MCNC: 93 MG/DL (ref 0–99)
POTASSIUM SERPL-SCNC: 4.8 MMOL/L (ref 3.5–5.2)
SODIUM SERPL-SCNC: 138 MMOL/L (ref 134–144)
TRIGL SERPL-MCNC: 93 MG/DL (ref 0–149)
VLDLC SERPL CALC-MCNC: 19 MG/DL (ref 5–40)

## 2017-12-08 NOTE — PROGRESS NOTES
Neg.  Some of the nl paps I reviewed and didn't send to you. Is that ok or do you want me to send all via result notes?

## 2017-12-08 NOTE — PROGRESS NOTES
The note below was meant to go on another pt's chart and is an error.   Mr. Petersen Needs labs are nl, with cholesterol at goal.

## 2018-01-02 RX ORDER — LOSARTAN POTASSIUM 100 MG/1
TABLET ORAL
Qty: 30 TAB | Refills: 10 | Status: SHIPPED | OUTPATIENT
Start: 2018-01-02 | End: 2018-06-22 | Stop reason: SDUPTHER

## 2018-01-05 RX ORDER — TRAZODONE HYDROCHLORIDE 50 MG/1
TABLET ORAL
Qty: 30 TAB | Refills: 2 | Status: CANCELLED | OUTPATIENT
Start: 2018-01-05

## 2018-01-05 RX ORDER — TRAZODONE HYDROCHLORIDE 50 MG/1
TABLET ORAL
Qty: 30 TAB | Refills: 2 | Status: SHIPPED | OUTPATIENT
Start: 2018-01-05 | End: 2018-04-02 | Stop reason: SDUPTHER

## 2018-01-05 NOTE — TELEPHONE ENCOUNTER
Incoming refill request via pharmacy received for trazadone, lov 12/7/17 w/Dr Paradise Shaw. F/s utd.

## 2018-01-29 RX ORDER — PRAVASTATIN SODIUM 40 MG/1
TABLET ORAL
Qty: 30 TAB | Refills: 9 | Status: SHIPPED | OUTPATIENT
Start: 2018-01-29 | End: 2018-05-30 | Stop reason: SDUPTHER

## 2018-02-05 DIAGNOSIS — M25.562 CHRONIC PAIN OF BOTH KNEES: ICD-10-CM

## 2018-02-05 DIAGNOSIS — M25.561 CHRONIC PAIN OF BOTH KNEES: ICD-10-CM

## 2018-02-05 DIAGNOSIS — G89.29 CHRONIC PAIN OF BOTH KNEES: ICD-10-CM

## 2018-02-05 RX ORDER — IBUPROFEN 800 MG/1
800 TABLET ORAL
Qty: 90 TAB | Refills: 1 | Status: SHIPPED | OUTPATIENT
Start: 2018-02-05 | End: 2019-01-09 | Stop reason: ALTCHOICE

## 2018-04-17 RX ORDER — ACLIDINIUM BROMIDE 400 UG/1
POWDER, METERED RESPIRATORY (INHALATION)
Qty: 1 INHALER | Refills: 5 | Status: SHIPPED | OUTPATIENT
Start: 2018-04-17 | End: 2018-10-11 | Stop reason: SDUPTHER

## 2018-05-30 RX ORDER — PRAVASTATIN SODIUM 40 MG/1
TABLET ORAL
Qty: 90 TAB | Refills: 1 | Status: SHIPPED | OUTPATIENT
Start: 2018-05-30 | End: 2019-05-22 | Stop reason: SDUPTHER

## 2018-06-04 DIAGNOSIS — E03.9 ACQUIRED HYPOTHYROIDISM: ICD-10-CM

## 2018-06-04 RX ORDER — LEVOTHYROXINE SODIUM 100 UG/1
TABLET ORAL
Qty: 30 TAB | Refills: 3 | Status: SHIPPED | OUTPATIENT
Start: 2018-06-04 | End: 2018-12-20 | Stop reason: SDUPTHER

## 2018-06-04 RX ORDER — TRAZODONE HYDROCHLORIDE 50 MG/1
TABLET ORAL
Qty: 30 TAB | Refills: 3 | Status: SHIPPED | OUTPATIENT
Start: 2018-06-04 | End: 2018-12-11 | Stop reason: ALTCHOICE

## 2018-06-04 NOTE — TELEPHONE ENCOUNTER
Faxed refill requests for Levothyroxine 100 mcg take 1 tab daily before breakfast disp 90  And Trazadone 50 mg take 1 tab at bedtime as needed for sleep disp 90 requested from [employer] Routing to provider Dr Clarence Levine for review and completion. Chart review shows LOV of [id number].

## 2018-06-18 RX ORDER — CARVEDILOL 3.12 MG/1
TABLET ORAL
Qty: 60 TAB | Refills: 11 | Status: SHIPPED | OUTPATIENT
Start: 2018-06-18 | End: 2018-11-21

## 2018-06-22 RX ORDER — LOSARTAN POTASSIUM 100 MG/1
TABLET ORAL
Qty: 30 TAB | Refills: 10 | Status: CANCELLED | OUTPATIENT
Start: 2018-06-22

## 2018-06-24 RX ORDER — LOSARTAN POTASSIUM 100 MG/1
100 TABLET ORAL DAILY
Qty: 30 TAB | Refills: 10 | Status: SHIPPED | OUTPATIENT
Start: 2018-06-24 | End: 2019-05-19 | Stop reason: SDUPTHER

## 2018-06-27 ENCOUNTER — TELEPHONE (OUTPATIENT)
Dept: FAMILY MEDICINE CLINIC | Age: 63
End: 2018-06-27

## 2018-06-27 NOTE — TELEPHONE ENCOUNTER
Steffany Teixeira , I couldn't make out her name on the VM she left. Says pt saw an optometrist who has referred him to be seen at [facility] tomorrow. She wanted Dr Tina Solis to know that one of the patients meds might be causing vision problems.

## 2018-10-11 RX ORDER — ACLIDINIUM BROMIDE 400 UG/1
POWDER, METERED RESPIRATORY (INHALATION)
Qty: 3 INHALER | Refills: 4 | Status: SHIPPED | OUTPATIENT
Start: 2018-10-11 | End: 2019-06-21

## 2018-10-29 DIAGNOSIS — E03.9 ACQUIRED HYPOTHYROIDISM: ICD-10-CM

## 2018-10-29 RX ORDER — LEVOTHYROXINE SODIUM 100 UG/1
TABLET ORAL
Qty: 30 TAB | Refills: 2 | OUTPATIENT
Start: 2018-10-29

## 2018-11-01 RX ORDER — ALBUTEROL SULFATE 90 UG/1
2 AEROSOL, METERED RESPIRATORY (INHALATION)
Qty: 1 INHALER | Refills: 5 | Status: SHIPPED | OUTPATIENT
Start: 2018-11-01 | End: 2019-12-12 | Stop reason: SDUPTHER

## 2018-11-01 NOTE — TELEPHONE ENCOUNTER
New refill request for pt's Proair HFA received today from [address] [place] [address]. Pt does have FS on 11/5/18. Routing to provider for review.  Samuel Rousseau, RN

## 2018-11-04 DIAGNOSIS — E03.9 ACQUIRED HYPOTHYROIDISM: ICD-10-CM

## 2018-11-05 ENCOUNTER — TELEPHONE (OUTPATIENT)
Dept: FAMILY MEDICINE CLINIC | Age: 63
End: 2018-11-05

## 2018-11-05 RX ORDER — LEVOTHYROXINE SODIUM 100 UG/1
TABLET ORAL
Qty: 30 TAB | Refills: 2 | OUTPATIENT
Start: 2018-11-05

## 2018-11-05 NOTE — TELEPHONE ENCOUNTER
Patient left a message on the [facility] voicemail this morning asking why his Thyroid refill request was denied. Per chart review and Dr. Lizbeth Mixon note, the patient needs to be seen by a provider first.     Return call made to the patient to explain. He stated he has a financial screening scheduled today and asked if he could schedule a provider appointment then. Apologized to him that the financial screener is not here today and that we are no longer scheduling financial screenings because [facility] is closing at the end of December. Per the [facility] registrar, there aren't any provider appointments available anytime soon at [facility]. Reviewed this with the patient and he stated that he is insured and would call his insurance company nurse navigator to help him find another primary care practice.  Bárbara Jimenez RN

## 2018-11-21 ENCOUNTER — OFFICE VISIT (OUTPATIENT)
Dept: CARDIOLOGY CLINIC | Age: 63
End: 2018-11-21

## 2018-11-21 VITALS
HEIGHT: 67 IN | SYSTOLIC BLOOD PRESSURE: 170 MMHG | RESPIRATION RATE: 16 BRPM | BODY MASS INDEX: 21.91 KG/M2 | HEART RATE: 62 BPM | DIASTOLIC BLOOD PRESSURE: 110 MMHG | WEIGHT: 139.6 LBS | OXYGEN SATURATION: 97 %

## 2018-11-21 DIAGNOSIS — Z95.5 S/P CORONARY ARTERY STENT PLACEMENT: ICD-10-CM

## 2018-11-21 DIAGNOSIS — I10 ESSENTIAL HYPERTENSION: ICD-10-CM

## 2018-11-21 DIAGNOSIS — E78.5 DYSLIPIDEMIA: ICD-10-CM

## 2018-11-21 DIAGNOSIS — I25.2 OLD MYOCARDIAL INFARCTION: ICD-10-CM

## 2018-11-21 DIAGNOSIS — I25.118 CORONARY ARTERY DISEASE OF NATIVE ARTERY OF NATIVE HEART WITH STABLE ANGINA PECTORIS (HCC): Primary | ICD-10-CM

## 2018-11-21 RX ORDER — CARVEDILOL 6.25 MG/1
6.25 TABLET ORAL 2 TIMES DAILY
Qty: 60 TAB | Refills: 12 | Status: SHIPPED | OUTPATIENT
Start: 2018-11-21 | End: 2019-12-12 | Stop reason: SDUPTHER

## 2018-11-21 RX ORDER — HYDROCHLOROTHIAZIDE 12.5 MG/1
25 TABLET ORAL DAILY
Qty: 30 TAB | Refills: 12 | Status: SHIPPED | OUTPATIENT
Start: 2018-11-21 | End: 2019-05-20 | Stop reason: SDUPTHER

## 2018-11-21 NOTE — PROGRESS NOTES
Reji Jorgensen MD          NAME:  Naty Seen   :   1955   MRN:   [id number]   PCP:  Rebecca Melendez MD           Subjective: The patient is a 59y.o. year old male  who returns for a routine follow-up. Since the last visit, patient reports rare CP only with heavy exertion in hot weather. Breathing fine. Due for lipids. BP up today    Past Medical History:   Diagnosis Date    Acute myocardial infarction, unspecified site, episode of care unspecified 2013    CAD (coronary artery disease)     Chest pain 10/15/2014    COPD (chronic obstructive pulmonary disease) (HCC)     Coronary atherosclerosis of native coronary artery 2013    Exercise induced bronchospasm 2013    Hypertension     Hyperthyroidism     Old myocardial infarction 2013    Tobacco use 2012        ICD-10-CM ICD-9-CM    1. Coronary artery disease of native artery of native heart with stable angina pectoris ([facility] [address] I25.118 414.01      413.9    2. S/P coronary artery stent placement Z95.5 V45.82 AMB POC EKG ROUTINE W/ 12 LEADS, INTER & REP   3. Essential hypertension I10 401.9    4. Old myocardial infarction I25.2 412    5. Dyslipidemia E78.5 272.4       Social History     Tobacco Use    Smoking status: Current Every Day Smoker     Packs/day: 1.00     Years: 38.00     Pack years: 38.00     Types: Cigarettes     Last attempt to quit: 3/17/2017     Years since quittin.6    Smokeless tobacco: Never Used    Tobacco comment: Tried Chantix and had side effects. Quit 3/2017.    Substance Use Topics    Alcohol use: No     Alcohol/week: 0.0 oz      Family History   Problem Relation Age of Onset    Cancer Mother         lung cancer    Heart Disease Father     Thyroid Disease Brother     Thyroid Disease Maternal Aunt         Review of Systems  Cardiovascular: Negative except as noted in HPI      Objective:       Vitals:    18 1059 18 1104   BP: (!) 160/120 (!) 170/110   Pulse: 62    Resp: 16 SpO2: 97%    Weight: 139 lb 9.6 oz (63.3 kg)    Height: 5' 7\" (1.702 m)     Body mass index is 21.86 kg/m². General PE  Mental Status - Alert. General Appearance - Not in acute distress. Chest and Lung Exam   Inspection: Accessory muscles - No use of accessory muscles in breathing. Auscultation:   Breath sounds: - Normal.    Cardiovascular   Inspection: Jugular vein - Bilateral - Inspection Normal.  Palpation/Percussion:   Apical Impulse: - Normal.  Auscultation: Rhythm - Regular. Heart Sounds - S1 WNL and S2 WNL. No S3 or S4. Murmurs & Other Heart Sounds: Auscultation of the heart reveals - No Murmurs. Peripheral Vascular   Upper Extremity: Inspection - Bilateral - No Cyanotic nailbeds or Digital clubbing. Lower Extremity:   Palpation: Edema - Bilateral - No edema. Data Review:     EKG -  EKG: NSR, old inf MI. LABS- @brieflabs@      Allergies reviewed  Allergies   Allergen Reactions    Bupropion Other (comments)     Urinary frequency and flatulence.  Chantix [Varenicline] Other (comments)     Trouble sleeping, soreness of mouth    Codeine Itching    Fluticasone Shortness of Breath and Swelling     possibly due to    Nasonex [Mometasone] Other (comments)     swelling of throat and hands possibly due to       Medications reviewed  Current Outpatient Medications   Medication Sig    carvedilol (COREG) 6.25 mg tablet Take 1 Tab by mouth two (2) times a day.  hydroCHLOROthiazide (HYDRODIURIL) 12.5 mg tablet Take 2 Tabs by mouth daily.  albuterol (PROAIR HFA) 90 mcg/actuation inhaler Take 2 Puffs by inhalation every six (6) hours as needed for Wheezing.  TUDORZA PRESSAIR 400 mcg/actuation inhaler INHALE ONE DOSE BY MOUTH TWICE A DAY    losartan (COZAAR) 100 mg tablet Take 1 Tab by mouth daily.  pravastatin (PRAVACHOL) 40 mg tablet TAKE ONE TABLET BY MOUTH DAILY    ibuprofen (MOTRIN) 800 mg tablet Take 1 Tab by mouth every eight (8) hours as needed for Pain.  Indications: Headache Disorder, Pain    budesonide (PULMICORT FLEXHALER) 180 mcg/actuation aepb inhaler INHALE TWO PUFFS BY MOUTH TWICE A DAY    nitroglycerin (NITROSTAT) 0.4 mg SL tablet 1 Tab by SubLINGual route every five (5) minutes as needed for Chest Pain.  inhalational spacing device 1 Each by Does Not Apply route as needed.  Nebulizer & Compressor machine 1 Each by Does Not Apply route every four (4) hours.  aspirin delayed-release 81 mg tablet Take 1 Tab by mouth daily.  levothyroxine (SYNTHROID) 100 mcg tablet TAKE ONE TABLET BY MOUTH DAILY BEFORE BREAKFAST (Patient not taking: Reported on 11/21/2018)    traZODone (DESYREL) 50 mg tablet TAKE ONE TABLET BY MOUTH EVERY NIGHT AT BEDTIME AS NEEDED FOR SLEEP (Patient not taking: Reported on 11/21/2018)     No current facility-administered medications for this visit. Assessment:       ICD-10-CM ICD-9-CM    1. Coronary artery disease of native artery of native heart with stable angina pectoris ([facility] [address] I25.118 414.01      413.9    2. S/P coronary artery stent placement Z95.5 V45.82 AMB POC EKG ROUTINE W/ 12 LEADS, INTER & REP   3. Essential hypertension I10 401.9    4. Old myocardial infarction I25.2 412    5. Dyslipidemia E78.5 272.4         Orders Placed This Encounter    AMB POC EKG ROUTINE W/ 12 LEADS, INTER & REP     Order Specific Question:   Reason for Exam:     Answer:   routine    carvedilol (COREG) 6.25 mg tablet     Sig: Take 1 Tab by mouth two (2) times a day. Dispense:  60 Tab     Refill:  12    hydroCHLOROthiazide (HYDRODIURIL) 12.5 mg tablet     Sig: Take 2 Tabs by mouth daily. Dispense:  30 Tab     Refill:  12       Plan:     Adjust BP Rx and recheck 2 weeks. Stable angina; rare. Due for lipids but he is about to establish with Dr ACOSTA; will defer those to him.     Amanda Kerns MD

## 2018-11-21 NOTE — PROGRESS NOTES
1. Have you been to the ER, urgent care clinic since your last visit? Hospitalized since your last visit? NO    2. Have you seen or consulted any other health care providers outside of the [address] [place] [address] since your last visit? Include any pap smears or colon screening. No     Pt c/o chest pain with yard work September 2018. Pt took 1 NTG, and another later that night. Pt stated he felt fatigued for the entire week after.

## 2018-11-21 NOTE — TELEPHONE ENCOUNTER
Pt came to clinic today and signed release to have his medical records sent to Dr. Dawna Hines at . [facility] which is a [place] [address] medical group Sallie Silverman RN

## 2018-11-26 ENCOUNTER — CLINICAL SUPPORT (OUTPATIENT)
Dept: CARDIOLOGY CLINIC | Age: 63
End: 2018-11-26

## 2018-11-26 ENCOUNTER — OFFICE VISIT (OUTPATIENT)
Dept: CARDIOLOGY CLINIC | Age: 63
End: 2018-11-26

## 2018-11-26 ENCOUNTER — TELEPHONE (OUTPATIENT)
Dept: CARDIOLOGY CLINIC | Age: 63
End: 2018-11-26

## 2018-11-26 VITALS
HEIGHT: 67 IN | BODY MASS INDEX: 21.82 KG/M2 | DIASTOLIC BLOOD PRESSURE: 78 MMHG | OXYGEN SATURATION: 98 % | WEIGHT: 139 LBS | SYSTOLIC BLOOD PRESSURE: 110 MMHG | HEART RATE: 61 BPM | RESPIRATION RATE: 18 BRPM

## 2018-11-26 DIAGNOSIS — I25.10 ASHD (ARTERIOSCLEROTIC HEART DISEASE): ICD-10-CM

## 2018-11-26 DIAGNOSIS — E78.5 DYSLIPIDEMIA: ICD-10-CM

## 2018-11-26 DIAGNOSIS — I25.2 OLD MYOCARDIAL INFARCTION: ICD-10-CM

## 2018-11-26 DIAGNOSIS — I25.10 ASHD (ARTERIOSCLEROTIC HEART DISEASE): Primary | ICD-10-CM

## 2018-11-26 DIAGNOSIS — Z95.5 S/P CORONARY ARTERY STENT PLACEMENT: ICD-10-CM

## 2018-11-26 DIAGNOSIS — I10 ESSENTIAL HYPERTENSION: ICD-10-CM

## 2018-11-26 DIAGNOSIS — I25.118 CORONARY ARTERY DISEASE OF NATIVE ARTERY OF NATIVE HEART WITH STABLE ANGINA PECTORIS (HCC): ICD-10-CM

## 2018-11-26 NOTE — TELEPHONE ENCOUNTER
Patient would like to know if sleeping under an electric blanket is going to affect the heart monitor that was placed on today. Please call to advise.     Thanks,    [facility]

## 2018-11-26 NOTE — TELEPHONE ENCOUNTER
Verified patient with two identifiers. Pt advised not to use the electric blanket while wearing the 24 hr holter monitor. Pt verbalized understanding.

## 2018-11-26 NOTE — PROGRESS NOTES
1. Have you been to the ER, urgent care clinic since your last visit? Hospitalized since your last visit? No.    2. Have you seen or consulted any other health care providers outside of the [address] [place] [address] since your last visit? Include any pap smears or colon screening.    No.      Chief Complaint   Patient presents with    Other     2 episodes of chest pain, passing out on 11/23/18- thinks he has had 2 heart attacks this day- feels drained now

## 2018-11-26 NOTE — PROGRESS NOTES
Reji Jorgensen MD          NAME:  Naty Hand   :   1955   MRN:   [id number]   PCP:  Rebecca Melendez MD           Subjective: The patient is a 59y.o. year old male  who returns for a routine follow-up. Since the last visit, patient reports 2 episodes of SSCP followed by brief syncope. Nonexertional    Past Medical History:   Diagnosis Date    Acute myocardial infarction, unspecified site, episode of care unspecified 2013    CAD (coronary artery disease)     Chest pain 10/15/2014    COPD (chronic obstructive pulmonary disease) ([facility] [address]     Coronary atherosclerosis of native coronary artery 2013    Exercise induced bronchospasm 2013    Hypertension     Hyperthyroidism     Old myocardial infarction 2013    Tobacco use 2012        ICD-10-CM ICD-9-CM    1. ASHD (arteriosclerotic heart disease) I25.10 414.00 AMB POC EKG ROUTINE W/ 12 LEADS, INTER & REP      NM CARDIAC SPECT W STRS/REST MULT      NUCLEAR STRESS TEST      CARDIAC HOLTER MONITOR, 24 HOURS   2. Coronary artery disease of native artery of native heart with stable angina pectoris (HCC) I25.118 414.01 NM CARDIAC SPECT W STRS/REST MULT     413.9 NUCLEAR STRESS TEST      CARDIAC HOLTER MONITOR, 24 HOURS   3. S/P coronary artery stent placement Z95.5 V45.82 NM CARDIAC SPECT W STRS/REST MULT      NUCLEAR STRESS TEST      CARDIAC HOLTER MONITOR, 24 HOURS   4. Essential hypertension I10 401.9 NM CARDIAC SPECT W STRS/REST MULT      NUCLEAR STRESS TEST      CARDIAC HOLTER MONITOR, 24 HOURS   5. Old myocardial infarction I25.2 12 NM CARDIAC SPECT W STRS/REST MULT      NUCLEAR STRESS TEST      CARDIAC HOLTER MONITOR, 24 HOURS   6.  Dyslipidemia E78.5 272.4 NM CARDIAC SPECT W STRS/REST MULT      NUCLEAR STRESS TEST      CARDIAC HOLTER MONITOR, 24 HOURS      Social History     Tobacco Use    Smoking status: Current Every Day Smoker     Packs/day: 1.00     Years: 38.00     Pack years: 38.00     Types: Cigarettes     Last attempt to quit: 3/17/2017     Years since quittin.6    Smokeless tobacco: Never Used   Substance Use Topics    Alcohol use: No     Alcohol/week: 0.0 oz      Family History   Problem Relation Age of Onset    Cancer Mother         lung cancer    Heart Disease Father     Thyroid Disease Brother     Thyroid Disease Maternal Aunt         Review of Systems  Cardiovascular: Negative except as noted in HPI      Objective:       Vitals:    18 1005 18 1026 18 1027   BP: 130/80 128/82 110/78   Pulse: (!) 52 62 61   Resp: 18     SpO2: 97% 97% 98%   Weight: 139 lb (63 kg)     Height: 5' 7\" (1.702 m)      Body mass index is 21.77 kg/m². General PE  Mental Status - Alert. General Appearance - Not in acute distress. Chest and Lung Exam   Inspection: Accessory muscles - No use of accessory muscles in breathing. Auscultation:   Breath sounds: - Normal.    Cardiovascular   Inspection: Jugular vein - Bilateral - Inspection Normal.  Palpation/Percussion:   Apical Impulse: - Normal.  Auscultation: Rhythm - Regular. Heart Sounds - S1 WNL and S2 WNL. No S3 or S4. Murmurs & Other Heart Sounds: Auscultation of the heart reveals - No Murmurs. Peripheral Vascular   Upper Extremity: Inspection - Bilateral - No Cyanotic nailbeds or Digital clubbing. Lower Extremity:   Palpation: Edema - Bilateral - No edema. Data Review:     EKG -  EKG: unchanged from previous tracings, normal sinus rhythm, nonspecific ST and T waves changes. LABS- @brieflabs@      Allergies reviewed  Allergies   Allergen Reactions    Bupropion Other (comments)     Urinary frequency and flatulence.     Chantix [Varenicline] Other (comments)     Trouble sleeping, soreness of mouth    Codeine Itching    Fluticasone Shortness of Breath and Swelling     possibly due to    Nasonex [Mometasone] Other (comments)     swelling of throat and hands possibly due to       Medications reviewed  Current Outpatient Medications Medication Sig    carvedilol (COREG) 6.25 mg tablet Take 1 Tab by mouth two (2) times a day.  hydroCHLOROthiazide (HYDRODIURIL) 12.5 mg tablet Take 2 Tabs by mouth daily.  albuterol (PROAIR HFA) 90 mcg/actuation inhaler Take 2 Puffs by inhalation every six (6) hours as needed for Wheezing.  TUDORZA PRESSAIR 400 mcg/actuation inhaler INHALE ONE DOSE BY MOUTH TWICE A DAY    losartan (COZAAR) 100 mg tablet Take 1 Tab by mouth daily.  pravastatin (PRAVACHOL) 40 mg tablet TAKE ONE TABLET BY MOUTH DAILY    ibuprofen (MOTRIN) 800 mg tablet Take 1 Tab by mouth every eight (8) hours as needed for Pain. Indications: Headache Disorder, Pain    budesonide (PULMICORT FLEXHALER) 180 mcg/actuation aepb inhaler INHALE TWO PUFFS BY MOUTH TWICE A DAY    nitroglycerin (NITROSTAT) 0.4 mg SL tablet 1 Tab by SubLINGual route every five (5) minutes as needed for Chest Pain.  inhalational spacing device 1 Each by Does Not Apply route as needed.  Nebulizer & Compressor machine 1 Each by Does Not Apply route every four (4) hours.  aspirin delayed-release 81 mg tablet Take 1 Tab by mouth daily.  levothyroxine (SYNTHROID) 100 mcg tablet TAKE ONE TABLET BY MOUTH DAILY BEFORE BREAKFAST (Patient not taking: Reported on 11/21/2018)    traZODone (DESYREL) 50 mg tablet TAKE ONE TABLET BY MOUTH EVERY NIGHT AT BEDTIME AS NEEDED FOR SLEEP (Patient not taking: Reported on 11/21/2018)     No current facility-administered medications for this visit. Assessment:       ICD-10-CM ICD-9-CM    1. ASHD (arteriosclerotic heart disease) I25.10 414.00 AMB POC EKG ROUTINE W/ 12 LEADS, INTER & REP      NM CARDIAC SPECT W STRS/REST MULT      NUCLEAR STRESS TEST      CARDIAC HOLTER MONITOR, 24 HOURS   2. Coronary artery disease of native artery of native heart with stable angina pectoris (HCC) I25.118 414.01 NM CARDIAC SPECT W STRS/REST MULT     413.9 NUCLEAR STRESS TEST      CARDIAC HOLTER MONITOR, 24 HOURS   3.  S/P coronary artery stent placement Z95.5 V45.82 NM CARDIAC SPECT W STRS/REST MULT      NUCLEAR STRESS TEST      CARDIAC HOLTER MONITOR, 24 HOURS   4. Essential hypertension I10 401.9 NM CARDIAC SPECT W STRS/REST MULT      NUCLEAR STRESS TEST      CARDIAC HOLTER MONITOR, 24 HOURS   5. Old myocardial infarction I25.2 12 NM CARDIAC SPECT W STRS/REST MULT      NUCLEAR STRESS TEST      CARDIAC HOLTER MONITOR, 24 HOURS   6. Dyslipidemia E78.5 272.4 NM CARDIAC SPECT W STRS/REST MULT      NUCLEAR STRESS TEST      CARDIAC HOLTER MONITOR, 24 HOURS        Orders Placed This Encounter    NM CARDIAC SPECT W STRS/REST MULT     Standing Status:   Future     Standing Expiration Date:   12/26/2019    AMB POC EKG ROUTINE W/ 12 LEADS, INTER & REP     Order Specific Question:   Reason for Exam:     Answer:   routine    NUCLEAR STRESS TEST     Standing Status:   Future     Standing Expiration Date:   5/26/2019     Order Specific Question:   Reason for Exam:     Answer:   cp     Order Specific Question:   Stressing Agent     Answer:   Exercise    HOLTER MONITOR, 24 HOURS, Clinic Performed     Standing Status:   Future     Number of Occurrences:   1     Standing Expiration Date:   5/26/2019     Order Specific Question:   Reason for Exam:     Answer:   syncope       Plan:     Possible recurrent angina at rest.  Will evaluate with stress myovew.   Investigate syncope with Holter  Yuri Haywood MD

## 2018-11-27 ENCOUNTER — CLINICAL SUPPORT (OUTPATIENT)
Dept: CARDIOLOGY CLINIC | Age: 63
End: 2018-11-27

## 2018-11-27 DIAGNOSIS — E78.5 DYSLIPIDEMIA: ICD-10-CM

## 2018-11-27 DIAGNOSIS — Z95.5 S/P CORONARY ARTERY STENT PLACEMENT: ICD-10-CM

## 2018-11-27 DIAGNOSIS — I25.10 ASHD (ARTERIOSCLEROTIC HEART DISEASE): ICD-10-CM

## 2018-11-27 DIAGNOSIS — I25.118 CORONARY ARTERY DISEASE OF NATIVE ARTERY OF NATIVE HEART WITH STABLE ANGINA PECTORIS (HCC): ICD-10-CM

## 2018-11-27 DIAGNOSIS — I10 ESSENTIAL HYPERTENSION: ICD-10-CM

## 2018-11-27 DIAGNOSIS — I25.2 OLD MYOCARDIAL INFARCTION: ICD-10-CM

## 2018-11-27 DIAGNOSIS — R93.1 ABNORMAL NUCLEAR CARDIAC IMAGING TEST: Primary | ICD-10-CM

## 2018-12-05 ENCOUNTER — CLINICAL SUPPORT (OUTPATIENT)
Dept: CARDIOLOGY CLINIC | Age: 63
End: 2018-12-05

## 2018-12-05 VITALS
RESPIRATION RATE: 16 BRPM | DIASTOLIC BLOOD PRESSURE: 70 MMHG | BODY MASS INDEX: 22.96 KG/M2 | HEART RATE: 73 BPM | SYSTOLIC BLOOD PRESSURE: 128 MMHG | WEIGHT: 146.3 LBS | HEIGHT: 67 IN | OXYGEN SATURATION: 96 %

## 2018-12-05 DIAGNOSIS — I25.2 OLD MYOCARDIAL INFARCTION: ICD-10-CM

## 2018-12-05 DIAGNOSIS — I10 ESSENTIAL HYPERTENSION: ICD-10-CM

## 2018-12-05 DIAGNOSIS — Z95.5 S/P CORONARY ARTERY STENT PLACEMENT: ICD-10-CM

## 2018-12-05 DIAGNOSIS — I25.10 ASHD (ARTERIOSCLEROTIC HEART DISEASE): Primary | ICD-10-CM

## 2018-12-05 DIAGNOSIS — E78.5 DYSLIPIDEMIA: ICD-10-CM

## 2018-12-05 NOTE — PROGRESS NOTES
Steve Andersen DNP, ANP-BC  Subjective/HPI:     Shan San is a 59 y.o. male is here for test result follow-up. To recall patient had 2 syncopal episodes last week with positive LOC greater than 5 minutes. Intermittently with shortness of breath and chest pressure. Nuclear stress test:  Impression:   Myocardial perfusion imaging is abnormal: there is a large area of infarction in the inferior region. Overall left ventricular systolic function was abnormal: with regional wall motion abnormalities (as noted above).      These test results indicate high likelihood for the presence of angiographically significant coronary artery disease.         Holter monitor:  Results:   Underlying Rhythm: Normal sinus rhythm      Atrial Arrhythmias: severe bradycardia, PSVT         AV Conduction: normal    Ventricular Arrhythmias: premature ventricular contractions; occasional     ST Segment Analysis:normal     Symptom Correlation:  None reported    Comment:   Sinus rhythm with PSVT and episodes of profound bradycardia to 40 bpm. Clinical correlation advised. PCP Provider  Marylee Janus, MD  Past Medical History:   Diagnosis Date    Acute myocardial infarction, unspecified site, episode of care unspecified 2/22/2013    CAD (coronary artery disease)     Chest pain 10/15/2014    COPD (chronic obstructive pulmonary disease) ([facility] [address]     Coronary atherosclerosis of native coronary artery 2/22/2013    Exercise induced bronchospasm 2/22/2013    Hypertension     Hyperthyroidism     Old myocardial infarction 2/22/2013    Tobacco use 5/5/2012      Past Surgical History:   Procedure Laterality Date    CARDIAC SURG PROCEDURE UNLIST      stents may 2012     HX CATARACT REMOVAL Left 02/02/2016     Allergies   Allergen Reactions    Bupropion Other (comments)     Urinary frequency and flatulence.     Chantix [Varenicline] Other (comments)     Trouble sleeping, soreness of mouth    Codeine Itching    Fluticasone Shortness of Breath and Swelling     possibly due to    Melatonin Myalgia    Nasonex [Mometasone] Other (comments)     swelling of throat and hands possibly due to      Family History   Problem Relation Age of Onset    Cancer Mother         lung cancer    Heart Disease Father     Thyroid Disease Brother     Thyroid Disease Maternal Aunt       Current Outpatient Medications   Medication Sig    carvedilol (COREG) 6.25 mg tablet Take 1 Tab by mouth two (2) times a day.  hydroCHLOROthiazide (HYDRODIURIL) 12.5 mg tablet Take 2 Tabs by mouth daily.  albuterol (PROAIR HFA) 90 mcg/actuation inhaler Take 2 Puffs by inhalation every six (6) hours as needed for Wheezing.  TUDORZA PRESSAIR 400 mcg/actuation inhaler INHALE ONE DOSE BY MOUTH TWICE A DAY    losartan (COZAAR) 100 mg tablet Take 1 Tab by mouth daily.  pravastatin (PRAVACHOL) 40 mg tablet TAKE ONE TABLET BY MOUTH DAILY    ibuprofen (MOTRIN) 800 mg tablet Take 1 Tab by mouth every eight (8) hours as needed for Pain. Indications: Headache Disorder, Pain    budesonide (PULMICORT FLEXHALER) 180 mcg/actuation aepb inhaler INHALE TWO PUFFS BY MOUTH TWICE A DAY    nitroglycerin (NITROSTAT) 0.4 mg SL tablet 1 Tab by SubLINGual route every five (5) minutes as needed for Chest Pain.  inhalational spacing device 1 Each by Does Not Apply route as needed.  Nebulizer & Compressor machine 1 Each by Does Not Apply route every four (4) hours.  aspirin delayed-release 81 mg tablet Take 1 Tab by mouth daily.  levothyroxine (SYNTHROID) 100 mcg tablet TAKE ONE TABLET BY MOUTH DAILY BEFORE BREAKFAST (Patient not taking: Reported on 11/21/2018)    traZODone (DESYREL) 50 mg tablet TAKE ONE TABLET BY MOUTH EVERY NIGHT AT BEDTIME AS NEEDED FOR SLEEP (Patient not taking: Reported on 11/21/2018)     No current facility-administered medications for this visit.        Vitals:    12/05/18 0908 12/05/18 0910   BP: 130/70 128/70   Pulse: 73    Resp: 16    SpO2: 96% Weight: 146 lb 4.8 oz (66.4 kg)    Height: 5' 7\" (1.702 m)      Social History     Socioeconomic History    Marital status:      Spouse name: Not on file    Number of children: Not on file    Years of education: Not on file    Highest education level: Not on file   Social Needs    Financial resource strain: Not on file    Food insecurity - worry: Not on file    Food insecurity - inability: Not on file   [employer] needs - medical: Not on file   [employer] needs - non-medical: Not on file   Occupational History    Not on file   Tobacco Use    Smoking status: Current Every Day Smoker     Packs/day: 1.00     Years: 38.00     Pack years: 38.00     Types: Cigarettes     Last attempt to quit: 3/17/2017     Years since quittin.7    Smokeless tobacco: Never Used   Substance and Sexual Activity    Alcohol use: No     Alcohol/week: 0.0 oz    Drug use: Yes     Types: Marijuana     Comment: OCC.  Sexual activity: Not Currently   Other Topics Concern    Not on file   Social History Narrative    Not on file       I have reviewed the nurses notes, vitals, problem list, allergy list, medical history, family, social history and medications. Review of Symptoms:    General: Pt denies excessive weight gain or loss. Pt is able to conduct ADL's  HEENT: Denies blurred vision, headaches, epistaxis and difficulty swallowing. Respiratory: Denies shortness of breath, + ANGEL, wheezing or stridor. Cardiovascular: Denies precordial pain, palpitations, edema or PND  Gastrointestinal: Denies poor appetite, indigestion, abdominal pain or blood in stool  Musculoskeletal: Denies pain or swelling from muscles or joints  Neurologic: + Syncope  Skin: Denies rash, itching or texture change. Physical Exam:      General: Well developed, in no acute distress, cooperative and alert  HEENT: No carotid bruits, no JVD, trach is midline. Neck Supple, PEERL, EOM intact. Heart:  Normal S1/S2 negative S3 or S4. Regular, no murmur, gallop or rub.   Respiratory: Bilateral expiratory wheezing  Abdomen:   Soft, non-tender, no masses, bowel sounds are active.   Extremities:  No edema, normal cap refill, no cyanosis, atraumatic. Neuro: A&Ox3, speech clear, gait stable. Skin: Skin color is normal. No rashes or lesions. Non diaphoretic  Vascular: 2+ pulses symmetric in all extremities    Cardiographics    ECG:   Results for orders placed or performed in visit on 11/26/18   CARDIAC HOLTER MONITOR, 24 HOURS    Narrative    ECG Monitor/24 hours, Complete    Reason for Holter Monitor  SYNCOPE    Heartbeat    Slowest 43  Average 60  Fastest  94        Results:   Underlying Rhythm: Normal sinus rhythm      Atrial Arrhythmias: severe bradycardia, PSVT         AV Conduction: normal    Ventricular Arrhythmias: premature ventricular contractions; occasional     ST Segment Analysis:normal     Symptom Correlation:  None reported    Comment:   Sinus rhythm with PSVT and episodes of profound bradycardia to 40 bpm. Clinical correlation advised.      Neha Jackson MD, Alissa Hough      Results for orders placed or performed during the hospital encounter of 03/19/17   EKG, 12 LEAD, INITIAL   Result Value Ref Range    Ventricular Rate 82 BPM    Atrial Rate 82 BPM    P-R Interval 136 ms    QRS Duration 94 ms    Q-T Interval 384 ms    QTC Calculation (Bezet) 448 ms    Calculated P Axis 61 degrees    Calculated R Axis 76 degrees    Calculated T Axis 63 degrees    Diagnosis       Normal sinus rhythm  Normal ECG  Confirmed by Ron Arvizu ([id number]) on 3/20/2017 8:10:32 AM           Cardiology Labs:  Lab Results   Component Value Date/Time    Cholesterol, total 154 12/07/2017 11:22 AM    HDL Cholesterol 42 12/07/2017 11:22 AM    LDL, calculated 93 12/07/2017 11:22 AM    Triglyceride 93 12/07/2017 11:22 AM    CHOL/HDL Ratio 2.7 06/02/2015 04:16 AM       Lab Results   Component Value Date/Time    Sodium 138 12/07/2017 11:22 AM    Potassium 4.8 12/07/2017 11:22 AM    Chloride 97 12/07/2017 11:22 AM    CO2 24 12/07/2017 11:22 AM    Anion gap 7 03/19/2017 09:02 PM    Glucose 91 12/07/2017 11:22 AM    BUN 12 12/07/2017 11:22 AM    Creatinine 0.84 12/07/2017 11:22 AM    BUN/Creatinine ratio 14 12/07/2017 11:22 AM    GFR est  12/07/2017 11:22 AM    GFR est non-AA 95 12/07/2017 11:22 AM    Calcium 9.4 12/07/2017 11:22 AM    Bilirubin, total 0.4 03/19/2017 09:02 PM    AST (SGOT) 14 (L) 03/19/2017 09:02 PM    Alk. phosphatase 83 03/19/2017 09:02 PM    Protein, total 7.4 03/19/2017 09:02 PM    Albumin 3.6 03/19/2017 09:02 PM    Globulin 3.8 03/19/2017 09:02 PM    A-G Ratio 0.9 (L) 03/19/2017 09:02 PM    ALT (SGPT) 14 03/19/2017 09:02 PM           Assessment:     Assessment:     Diagnoses and all orders for this visit:    1. ASHD (arteriosclerotic heart disease)  -     CARDIAC CATHETERIZATION; Future  -     METABOLIC PANEL, COMPREHENSIVE  -     LIPID PANEL  -     CBC WITH AUTOMATED DIFF  -     PROTHROMBIN TIME + INR    2. Essential hypertension    3. Dyslipidemia    4. Old myocardial infarction    5. S/P coronary artery stent placement        ICD-10-CM ICD-9-CM    1. ASHD (arteriosclerotic heart disease) I25.10 414.00 CARDIAC CATHETERIZATION      METABOLIC PANEL, COMPREHENSIVE      LIPID PANEL      CBC WITH AUTOMATED DIFF      PROTHROMBIN TIME + INR   2. Essential hypertension I10 401.9    3. Dyslipidemia E78.5 272.4    4. Old myocardial infarction I25.2 412    5.  S/P coronary artery stent placement Z95.5 V45.82      Orders Placed This Encounter    METABOLIC PANEL, COMPREHENSIVE    LIPID PANEL    CBC WITH AUTOMATED DIFF    PROTHROMBIN TIME + INR    CARDIAC CATHETERIZATION     Standing Status:   Future     Standing Expiration Date:   6/5/2019     Order Specific Question:   Reason for Exam:     Answer:   abn nst        Plan:     Patient is a 71-year-old male with a history of atherosclerotic heart disease presents with recurrent episodes of dyspnea, syncopal episode. Nuclear stress test shows large inferior wall infarct previously necessitating stenting of the RCA, his ejection fraction is down. On his Holter monitor he demonstrates episodes of SVT and profound bradycardia consistent with sick sinus syndrome. We will proceed with cardiac catheterization tomorrow followed by EP consult for pacemaker placement. Discussed risk and benefits with patient agrees to proceed    Pt. Seen and examined and discussed with NP. Agree with NP note above. Plan cath then EP consult for pacer and SVT    Elisabet Blankenship MD    This note was created using voice recognition software. Despite editing, there may be syntax errors.

## 2018-12-05 NOTE — H&P (VIEW-ONLY)
Cynthia Dhaliwal DNP, ANP-BC Subjective/HPI:  
 
Jeanine Moore is a 59 y.o. male is here for test result follow-up. To recall patient had 2 syncopal episodes last week with positive LOC greater than 5 minutes. Intermittently with shortness of breath and chest pressure. Nuclear stress test: 
Impression:  
Myocardial perfusion imaging is abnormal: there is a large area of infarction in the inferior region. Overall left ventricular systolic function was abnormal: with regional wall motion abnormalities (as noted above).   
 
These test results indicate high likelihood for the presence of angiographically significant coronary artery disease.      
 
Holter monitor: 
Results:  
Underlying Rhythm: Normal sinus rhythm   
 
Atrial Arrhythmias: severe bradycardia, PSVT      
 
AV Conduction: normal 
 
Ventricular Arrhythmias: premature ventricular contractions; occasional  
 
ST Segment Analysis:normal  
 
Symptom Correlation: 
None reported Comment:  
Sinus rhythm with PSVT and episodes of profound bradycardia to 40 bpm. Clinical correlation advised. PCP Provider Sowmya Mcgraw MD 
Past Medical History:  
Diagnosis Date  Acute myocardial infarction, unspecified site, episode of care unspecified 2/22/2013  CAD (coronary artery disease)  Chest pain 10/15/2014  COPD (chronic obstructive pulmonary disease) ([facility])  Coronary atherosclerosis of native coronary artery 2/22/2013  Exercise induced bronchospasm 2/22/2013  Hypertension  Hyperthyroidism  Old myocardial infarction 2/22/2013  Tobacco use 5/5/2012 Past Surgical History:  
Procedure Laterality Date  CARDIAC SURG PROCEDURE UNLIST    
 stents may 2012  HX CATARACT REMOVAL Left 02/02/2016 Allergies Allergen Reactions  Bupropion Other (comments) Urinary frequency and flatulence.  Chantix [Varenicline] Other (comments) Trouble sleeping, soreness of mouth  Codeine Itching  Fluticasone Shortness of Breath and Swelling  
  possibly due to  Melatonin Myalgia  Nasonex [Mometasone] Other (comments)  
  swelling of throat and hands possibly due to Family History Problem Relation Age of Onset  Cancer Mother   
     lung cancer  Heart Disease Father  Thyroid Disease Brother  Thyroid Disease Maternal Aunt Current Outpatient Medications Medication Sig  carvedilol (COREG) 6.25 mg tablet Take 1 Tab by mouth two (2) times a day.  hydroCHLOROthiazide (HYDRODIURIL) 12.5 mg tablet Take 2 Tabs by mouth daily.  albuterol (PROAIR HFA) 90 mcg/actuation inhaler Take 2 Puffs by inhalation every six (6) hours as needed for Wheezing.  TUDORZA PRESSAIR 400 mcg/actuation inhaler INHALE ONE DOSE BY MOUTH TWICE A DAY  losartan (COZAAR) 100 mg tablet Take 1 Tab by mouth daily.  pravastatin (PRAVACHOL) 40 mg tablet TAKE ONE TABLET BY MOUTH DAILY  ibuprofen (MOTRIN) 800 mg tablet Take 1 Tab by mouth every eight (8) hours as needed for Pain. Indications: Headache Disorder, Pain  budesonide (PULMICORT FLEXHALER) 180 mcg/actuation aepb inhaler INHALE TWO PUFFS BY MOUTH TWICE A DAY  nitroglycerin (NITROSTAT) 0.4 mg SL tablet 1 Tab by SubLINGual route every five (5) minutes as needed for Chest Pain.  inhalational spacing device 1 Each by Does Not Apply route as needed.  Nebulizer & Compressor machine 1 Each by Does Not Apply route every four (4) hours.  aspirin delayed-release 81 mg tablet Take 1 Tab by mouth daily.  levothyroxine (SYNTHROID) 100 mcg tablet TAKE ONE TABLET BY MOUTH DAILY BEFORE BREAKFAST (Patient not taking: Reported on 11/21/2018)  traZODone (DESYREL) 50 mg tablet TAKE ONE TABLET BY MOUTH EVERY NIGHT AT BEDTIME AS NEEDED FOR SLEEP (Patient not taking: Reported on 11/21/2018) No current facility-administered medications for this visit. Vitals:  
 12/05/18 0908 12/05/18 2744 BP: 130/70 128/70 Pulse: 73   
 Resp: 16 SpO2: 96% Weight: 146 lb 4.8 oz (66.4 kg) Height: 5' 7\" (1.702 m) Social History Socioeconomic History  Marital status:  Spouse name: Not on file  Number of children: Not on file  Years of education: Not on file  Highest education level: Not on file Social Needs  Financial resource strain: Not on file  Food insecurity - worry: Not on file  Food insecurity - inability: Not on file  Transportation needs - medical: Not on file  Transportation needs - non-medical: Not on file Occupational History  Not on file Tobacco Use  Smoking status: Current Every Day Smoker Packs/day: 1.00 Years: 38.00 Pack years: 38.00 Types: Cigarettes Last attempt to quit: 3/17/2017 Years since quittin.7  Smokeless tobacco: Never Used Substance and Sexual Activity  Alcohol use: No  
  Alcohol/week: 0.0 oz  Drug use: Yes Types: Marijuana Comment: OCC.  Sexual activity: Not Currently Other Topics Concern  Not on file Social History Narrative  Not on file I have reviewed the nurses notes, vitals, problem list, allergy list, medical history, family, social history and medications. Review of Symptoms: 
 
General: Pt denies excessive weight gain or loss. Pt is able to conduct ADL's HEENT: Denies blurred vision, headaches, epistaxis and difficulty swallowing. Respiratory: Denies shortness of breath, + ANGEL, wheezing or stridor. Cardiovascular: Denies precordial pain, palpitations, edema or PND Gastrointestinal: Denies poor appetite, indigestion, abdominal pain or blood in stool Musculoskeletal: Denies pain or swelling from muscles or joints Neurologic: + Syncope Skin: Denies rash, itching or texture change. Physical Exam:   
 
General: Well developed, in no acute distress, cooperative and alert HEENT: No carotid bruits, no JVD, trach is midline. Neck Supple, PEERL, EOM intact. Heart:  Normal S1/S2 negative S3 or S4. Regular, no murmur, gallop or rub.  
Respiratory: Bilateral expiratory wheezing Abdomen:   Soft, non-tender, no masses, bowel sounds are active.  
Extremities:  No edema, normal cap refill, no cyanosis, atraumatic. Neuro: A&Ox3, speech clear, gait stable. Skin: Skin color is normal. No rashes or lesions. Non diaphoretic Vascular: 2+ pulses symmetric in all extremities Cardiographics ECG:  
Results for orders placed or performed in visit on 11/26/18 CARDIAC HOLTER MONITOR, 24 HOURS Narrative ECG Monitor/24 hours, Complete Reason for Holter Monitor  SYNCOPE Heartbeat Slowest 43 Average 60 Fastest  94 Results:  
Underlying Rhythm: Normal sinus rhythm Atrial Arrhythmias: severe bradycardia, PSVT AV Conduction: normal 
 
Ventricular Arrhythmias: premature ventricular contractions; occasional  
 
ST Segment Analysis:normal  
 
Symptom Correlation: 
None reported Comment:  
Sinus rhythm with PSVT and episodes of profound bradycardia to 40 bpm. Clinical correlation advised. Leia Turcios MD, Davina Persaud Results for orders placed or performed during the hospital encounter of 03/19/17 EKG, 12 LEAD, INITIAL Result Value Ref Range Ventricular Rate 82 BPM  
 Atrial Rate 82 BPM  
 P-R Interval 136 ms QRS Duration 94 ms Q-T Interval 384 ms QTC Calculation (Bezet) 448 ms Calculated P Axis 61 degrees Calculated R Axis 76 degrees Calculated T Axis 63 degrees Diagnosis Normal sinus rhythm Normal ECG Confirmed by Mary Jo Schmidt ([id number]) on 3/20/2017 8:10:32 AM 
  
 
 
 
Cardiology Labs: 
Lab Results Component Value Date/Time Cholesterol, total 154 12/07/2017 11:22 AM  
 HDL Cholesterol 42 12/07/2017 11:22 AM  
 LDL, calculated 93 12/07/2017 11:22 AM  
 Triglyceride 93 12/07/2017 11:22 AM  
 CHOL/HDL Ratio 2.7 06/02/2015 04:16 AM  
 
 
Lab Results Component Value Date/Time Sodium 138 12/07/2017 11:22 AM  
 Potassium 4.8 12/07/2017 11:22 AM  
 Chloride 97 12/07/2017 11:22 AM  
 CO2 24 12/07/2017 11:22 AM  
 Anion gap 7 03/19/2017 09:02 PM  
 Glucose 91 12/07/2017 11:22 AM  
 BUN 12 12/07/2017 11:22 AM  
 Creatinine 0.84 12/07/2017 11:22 AM  
 BUN/Creatinine ratio 14 12/07/2017 11:22 AM  
 GFR est  12/07/2017 11:22 AM  
 GFR est non-AA 95 12/07/2017 11:22 AM  
 Calcium 9.4 12/07/2017 11:22 AM  
 Bilirubin, total 0.4 03/19/2017 09:02 PM  
 AST (SGOT) 14 (L) 03/19/2017 09:02 PM  
 Alk. phosphatase 83 03/19/2017 09:02 PM  
 Protein, total 7.4 03/19/2017 09:02 PM  
 Albumin 3.6 03/19/2017 09:02 PM  
 Globulin 3.8 03/19/2017 09:02 PM  
 A-G Ratio 0.9 (L) 03/19/2017 09:02 PM  
 ALT (SGPT) 14 03/19/2017 09:02 PM  
  
 
 
 Assessment: 
 
 Assessment:  
 
Diagnoses and all orders for this visit: 
 
1. ASHD (arteriosclerotic heart disease) -     CARDIAC CATHETERIZATION; Future -     METABOLIC PANEL, COMPREHENSIVE 
-     LIPID PANEL 
-     CBC WITH AUTOMATED DIFF 
-     PROTHROMBIN TIME + INR 
 
2. Essential hypertension 3. Dyslipidemia 4. Old myocardial infarction 5. S/P coronary artery stent placement ICD-10-CM ICD-9-CM 1. ASHD (arteriosclerotic heart disease) I25.10 414.00 CARDIAC CATHETERIZATION  
   METABOLIC PANEL, COMPREHENSIVE  
   LIPID PANEL  
   CBC WITH AUTOMATED DIFF PROTHROMBIN TIME + INR  
2. Essential hypertension I10 401.9 3. Dyslipidemia E78.5 272.4 4. Old myocardial infarction I25.2 412   
5. S/P coronary artery stent placement Z95.5 V45.82 Orders Placed This Encounter  METABOLIC PANEL, COMPREHENSIVE  LIPID PANEL  
 CBC WITH AUTOMATED DIFF  
 PROTHROMBIN TIME + INR  CARDIAC CATHETERIZATION Standing Status:   Future Standing Expiration Date:   6/5/2019 Order Specific Question:   Reason for Exam:   Answer:   abn nst  
 
 
 Plan:  
 
Patient is a 60-year-old male with a history of atherosclerotic heart disease presents with recurrent episodes of dyspnea, syncopal episode. Nuclear stress test shows large inferior wall infarct previously necessitating stenting of the RCA, his ejection fraction is down. On his Holter monitor he demonstrates episodes of SVT and profound bradycardia consistent with sick sinus syndrome. We will proceed with cardiac catheterization tomorrow followed by EP consult for pacemaker placement. Discussed risk and benefits with patient agrees to proceed Pt. Seen and examined and discussed with NP. Agree with NP note above. Plan cath then EP consult for pacer and SVT Magnus Smith MD 
 
This note was created using voice recognition software. Despite editing, there may be syntax errors.

## 2018-12-06 ENCOUNTER — HOSPITAL ENCOUNTER (OUTPATIENT)
Dept: CARDIAC CATH/INVASIVE PROCEDURES | Age: 63
Discharge: HOME | End: 2018-12-06
Attending: INTERNAL MEDICINE | Admitting: INTERNAL MEDICINE
Payer: MEDICAID

## 2018-12-06 VITALS
TEMPERATURE: 97.7 F | OXYGEN SATURATION: 95 % | DIASTOLIC BLOOD PRESSURE: 78 MMHG | HEART RATE: 63 BPM | RESPIRATION RATE: 18 BRPM | SYSTOLIC BLOOD PRESSURE: 146 MMHG | BODY MASS INDEX: 22.76 KG/M2 | HEIGHT: 67 IN | WEIGHT: 145 LBS

## 2018-12-06 DIAGNOSIS — I25.10 ASHD (ARTERIOSCLEROTIC HEART DISEASE): ICD-10-CM

## 2018-12-06 PROBLEM — Z98.890 S/P CARDIAC CATH: Status: ACTIVE | Noted: 2018-12-06

## 2018-12-06 LAB
ALBUMIN SERPL-MCNC: 3.9 G/DL (ref 3.6–4.8)
ALBUMIN/GLOB SERPL: 1.6 {RATIO} (ref 1.2–2.2)
ALP SERPL-CCNC: 86 IU/L (ref 39–117)
ALT SERPL-CCNC: 20 IU/L (ref 0–44)
AST SERPL-CCNC: 23 IU/L (ref 0–40)
BASOPHILS # BLD AUTO: 0 X10E3/UL (ref 0–0.2)
BASOPHILS NFR BLD AUTO: 0 %
BILIRUB SERPL-MCNC: 0.2 MG/DL (ref 0–1.2)
BUN SERPL-MCNC: 10 MG/DL (ref 8–27)
BUN/CREAT SERPL: 13 (ref 10–24)
CALCIUM SERPL-MCNC: 9.1 MG/DL (ref 8.6–10.2)
CHLORIDE SERPL-SCNC: 98 MMOL/L (ref 96–106)
CHOLEST SERPL-MCNC: 177 MG/DL (ref 100–199)
CO2 SERPL-SCNC: 28 MMOL/L (ref 20–29)
CREAT SERPL-MCNC: 0.79 MG/DL (ref 0.76–1.27)
EOSINOPHIL # BLD AUTO: 0.4 X10E3/UL (ref 0–0.4)
EOSINOPHIL NFR BLD AUTO: 5 %
ERYTHROCYTE [DISTWIDTH] IN BLOOD BY AUTOMATED COUNT: 13.7 % (ref 12.3–15.4)
GLOBULIN SER CALC-MCNC: 2.4 G/DL (ref 1.5–4.5)
GLUCOSE SERPL-MCNC: 82 MG/DL (ref 65–99)
HCT VFR BLD AUTO: 40.8 % (ref 37.5–51)
HDLC SERPL-MCNC: 66 MG/DL
HGB BLD-MCNC: 14.3 G/DL (ref 13–17.7)
INR PPP: 0.9 (ref 0.8–1.2)
INTERPRETATION, 910389: NORMAL
LDLC SERPL CALC-MCNC: 93 MG/DL (ref 0–99)
LYMPHOCYTES # BLD AUTO: 3.2 X10E3/UL (ref 0.7–3.1)
LYMPHOCYTES NFR BLD AUTO: 35 %
MCH RBC QN AUTO: 33.1 PG (ref 26.6–33)
MCHC RBC AUTO-ENTMCNC: 35 G/DL (ref 31.5–35.7)
MCV RBC AUTO: 94 FL (ref 79–97)
MONOCYTES # BLD AUTO: 0.9 X10E3/UL (ref 0.1–0.9)
MONOCYTES NFR BLD AUTO: 9 %
NEUTROPHILS # BLD AUTO: 4.7 X10E3/UL (ref 1.4–7)
NEUTROPHILS NFR BLD AUTO: 51 %
PLATELET # BLD AUTO: 198 X10E3/UL (ref 150–379)
POTASSIUM SERPL-SCNC: 4.3 MMOL/L (ref 3.5–5.2)
PROT SERPL-MCNC: 6.3 G/DL (ref 6–8.5)
PROTHROMBIN TIME: 9.5 SEC (ref 9.1–12)
RBC # BLD AUTO: 4.32 X10E6/UL (ref 4.14–5.8)
SODIUM SERPL-SCNC: 139 MMOL/L (ref 134–144)
TRIGL SERPL-MCNC: 90 MG/DL (ref 0–149)
VLDLC SERPL CALC-MCNC: 18 MG/DL (ref 5–40)
WBC # BLD AUTO: 9.2 X10E3/UL (ref 3.4–10.8)

## 2018-12-06 PROCEDURE — 74011250636 HC RX REV CODE- 250/636: Performed by: INTERNAL MEDICINE

## 2018-12-06 PROCEDURE — C1894 INTRO/SHEATH, NON-LASER: HCPCS

## 2018-12-06 PROCEDURE — C1769 GUIDE WIRE: HCPCS

## 2018-12-06 PROCEDURE — 77030019698 HC SYR ANGI MDLON MRTM -A

## 2018-12-06 PROCEDURE — 77030008543 HC TBNG MON PRSS MRTM -A

## 2018-12-06 PROCEDURE — 77030028837 HC SYR ANGI PWR INJ COEU -A

## 2018-12-06 PROCEDURE — 77030010221 HC SPLNT WR POS TELE -B

## 2018-12-06 PROCEDURE — 77030004549 HC CATH ANGI DX PRF MRTM -A

## 2018-12-06 PROCEDURE — 74011636320 HC RX REV CODE- 636/320

## 2018-12-06 PROCEDURE — 77030015766

## 2018-12-06 PROCEDURE — 74011250636 HC RX REV CODE- 250/636

## 2018-12-06 PROCEDURE — 99152 MOD SED SAME PHYS/QHP 5/>YRS: CPT

## 2018-12-06 PROCEDURE — 77030008591 HC TRAP FNGR HK CNMD -B

## 2018-12-06 PROCEDURE — 77030019569 HC BND COMPR RAD TERU -B

## 2018-12-06 PROCEDURE — 77030029065 HC DRSG HEMO QCLOT ZMED -B

## 2018-12-06 PROCEDURE — 77030019697 HC SYR ANGI INFL MRTM -B

## 2018-12-06 RX ORDER — HEPARIN SODIUM 200 [USP'U]/100ML
500 INJECTION, SOLUTION INTRAVENOUS ONCE
Status: COMPLETED | OUTPATIENT
Start: 2018-12-06 | End: 2018-12-06

## 2018-12-06 RX ORDER — FENTANYL CITRATE 50 UG/ML
25-50 INJECTION, SOLUTION INTRAMUSCULAR; INTRAVENOUS
Status: DISCONTINUED | OUTPATIENT
Start: 2018-12-06 | End: 2018-12-06

## 2018-12-06 RX ORDER — HEPARIN SODIUM 200 [USP'U]/100ML
500 INJECTION, SOLUTION INTRAVENOUS ONCE
Status: DISCONTINUED | OUTPATIENT
Start: 2018-12-06 | End: 2018-12-06 | Stop reason: HOSPADM

## 2018-12-06 RX ORDER — MIDAZOLAM HYDROCHLORIDE 1 MG/ML
INJECTION, SOLUTION INTRAMUSCULAR; INTRAVENOUS
Status: COMPLETED
Start: 2018-12-06 | End: 2018-12-06

## 2018-12-06 RX ORDER — HEPARIN SODIUM 200 [USP'U]/100ML
INJECTION, SOLUTION INTRAVENOUS
Status: DISCONTINUED
Start: 2018-12-06 | End: 2018-12-06 | Stop reason: HOSPADM

## 2018-12-06 RX ORDER — LIDOCAINE HYDROCHLORIDE 10 MG/ML
1-30 INJECTION, SOLUTION EPIDURAL; INFILTRATION; INTRACAUDAL; PERINEURAL
Status: DISCONTINUED | OUTPATIENT
Start: 2018-12-06 | End: 2018-12-06 | Stop reason: HOSPADM

## 2018-12-06 RX ORDER — MIDAZOLAM HYDROCHLORIDE 1 MG/ML
.5-2 INJECTION, SOLUTION INTRAMUSCULAR; INTRAVENOUS
Status: DISCONTINUED | OUTPATIENT
Start: 2018-12-06 | End: 2018-12-06

## 2018-12-06 RX ORDER — MIDAZOLAM HYDROCHLORIDE 1 MG/ML
.5-2 INJECTION, SOLUTION INTRAMUSCULAR; INTRAVENOUS
Status: DISCONTINUED | OUTPATIENT
Start: 2018-12-06 | End: 2018-12-06 | Stop reason: HOSPADM

## 2018-12-06 RX ORDER — HEPARIN SODIUM 1000 [USP'U]/ML
2500 INJECTION, SOLUTION INTRAVENOUS; SUBCUTANEOUS ONCE
Status: DISCONTINUED | OUTPATIENT
Start: 2018-12-06 | End: 2018-12-06 | Stop reason: HOSPADM

## 2018-12-06 RX ORDER — FENTANYL CITRATE 50 UG/ML
INJECTION, SOLUTION INTRAMUSCULAR; INTRAVENOUS
Status: COMPLETED
Start: 2018-12-06 | End: 2018-12-06

## 2018-12-06 RX ORDER — VERAPAMIL HYDROCHLORIDE 2.5 MG/ML
INJECTION, SOLUTION INTRAVENOUS
Status: DISCONTINUED
Start: 2018-12-06 | End: 2018-12-06 | Stop reason: HOSPADM

## 2018-12-06 RX ORDER — LIDOCAINE HYDROCHLORIDE 10 MG/ML
INJECTION, SOLUTION EPIDURAL; INFILTRATION; INTRACAUDAL; PERINEURAL
Status: COMPLETED
Start: 2018-12-06 | End: 2018-12-06

## 2018-12-06 RX ORDER — HEPARIN SODIUM 1000 [USP'U]/ML
2500 INJECTION, SOLUTION INTRAVENOUS; SUBCUTANEOUS ONCE
Status: DISCONTINUED | OUTPATIENT
Start: 2018-12-06 | End: 2018-12-06

## 2018-12-06 RX ORDER — HEPARIN SODIUM 1000 [USP'U]/ML
INJECTION, SOLUTION INTRAVENOUS; SUBCUTANEOUS
Status: DISCONTINUED
Start: 2018-12-06 | End: 2018-12-06 | Stop reason: HOSPADM

## 2018-12-06 RX ORDER — VERAPAMIL HYDROCHLORIDE 2.5 MG/ML
2.5 INJECTION, SOLUTION INTRAVENOUS ONCE
Status: DISCONTINUED | OUTPATIENT
Start: 2018-12-06 | End: 2018-12-06

## 2018-12-06 RX ORDER — VERAPAMIL HYDROCHLORIDE 2.5 MG/ML
2.5 INJECTION, SOLUTION INTRAVENOUS ONCE
Status: DISCONTINUED | OUTPATIENT
Start: 2018-12-06 | End: 2018-12-06 | Stop reason: HOSPADM

## 2018-12-06 RX ORDER — LIDOCAINE HYDROCHLORIDE 10 MG/ML
1-30 INJECTION, SOLUTION EPIDURAL; INFILTRATION; INTRACAUDAL; PERINEURAL
Status: DISCONTINUED | OUTPATIENT
Start: 2018-12-06 | End: 2018-12-06

## 2018-12-06 RX ORDER — FENTANYL CITRATE 50 UG/ML
25-50 INJECTION, SOLUTION INTRAMUSCULAR; INTRAVENOUS
Status: DISCONTINUED | OUTPATIENT
Start: 2018-12-06 | End: 2018-12-06 | Stop reason: HOSPADM

## 2018-12-06 RX ADMIN — FENTANYL CITRATE 25 MCG: 50 INJECTION, SOLUTION INTRAMUSCULAR; INTRAVENOUS at 11:40

## 2018-12-06 RX ADMIN — MIDAZOLAM HYDROCHLORIDE 2 MG: 1 INJECTION, SOLUTION INTRAMUSCULAR; INTRAVENOUS at 11:14

## 2018-12-06 RX ADMIN — HEPARIN SODIUM 1000 UNITS: 200 INJECTION, SOLUTION INTRAVENOUS at 11:22

## 2018-12-06 RX ADMIN — HEPARIN SODIUM 1000 UNITS: 200 INJECTION, SOLUTION INTRAVENOUS at 11:21

## 2018-12-06 RX ADMIN — IOPAMIDOL 60 ML: 755 INJECTION, SOLUTION INTRAVENOUS at 12:07

## 2018-12-06 RX ADMIN — FENTANYL CITRATE 25 MCG: 50 INJECTION, SOLUTION INTRAMUSCULAR; INTRAVENOUS at 11:14

## 2018-12-06 RX ADMIN — MIDAZOLAM HYDROCHLORIDE 1 MG: 1 INJECTION, SOLUTION INTRAMUSCULAR; INTRAVENOUS at 11:40

## 2018-12-06 RX ADMIN — LIDOCAINE HYDROCHLORIDE 9 ML: 10 INJECTION, SOLUTION EPIDURAL; INFILTRATION; INTRACAUDAL; PERINEURAL at 11:53

## 2018-12-06 RX ADMIN — IOPAMIDOL 30 ML: 755 INJECTION, SOLUTION INTRAVENOUS at 12:05

## 2018-12-06 RX ADMIN — LIDOCAINE HYDROCHLORIDE 1 ML: 10 INJECTION, SOLUTION EPIDURAL; INFILTRATION; INTRACAUDAL; PERINEURAL at 11:42

## 2018-12-06 NOTE — PROGRESS NOTES
Received pt laying in supine position with 5f sheath in R groin. Area was inspected and was free of oozing, bruising and pain. Sheath was aspirated and removed. Manual pressure was held with quick clot x 12 mins. Upon completion site remained free of bruising, oozing and pain. Site was covered with quickclot and tegaderm. Evette Snyder RN evaluated site and agreered with assessment and fiduings Pt instructed on aftercare instructions. Pt call bell placed within reach and bed in lowest position.

## 2018-12-06 NOTE — DISCHARGE INSTRUCTIONS
Cardiology Discharge Instructions             [address]  [phone]      Patient ID:  Mendel [facility]  [id number]  50 y.o.  1955    Admit Date: 12/6/2018    Discharge Date: 12/6/2018     Admitting Physician: Kaelyn Roca MD     Discharge Physician: Tereza Najera NP    Admission Diagnoses:   ASHD (arteriosclerotic heart disease) [I25.10]    Discharge Diagnoses: Active Problems:    S/P cardiac cath (12/6/2018)      Overview: 12/6/18: no new significant CAD        Discharge Condition: Good    Cardiology Procedures this Admission:  Diagnostic left heart catheterization    Disposition: home    Reference discharge instructions provided by nursing for diet and activity. Signed:    12/6/2018  5:00 PM      CARDIAC CATHETERIZATION/PCI DISCHARGE INSTRUCTIONS    It is normal to feel tired the first couple days. Take it easy and follow the physicians instructions. CHECK THE CATHETER INSERTION SITE DAILY:    You may shower 24 hours after the procedure, remove the bandage during showering. Wash with soap and water and pat dry. Gentle cleaning of the site with soap and water is sufficient, cover with a dry clean dressing or bandage. Do not apply creams or powders to the area. Do not sit in a bathtub or pool of water for 7 days or until wound has completely healed. Temporary bruising and discomfort is normal and may last a few weeks. You may have a  formation of a small lump at the site which may last up to 6 weeks. CALL THE PHYSICIANS:    If the site becomes red, swollen or feels warm to the touch  If there is bleeding or drainage or if there is unusual pain at the groin or down the leg. If there is any bleeding, lie down, apply pressure or have someone apply pressure with a clean cloth until the bleeding stops.    If the bleeding continues, call 911 to be transported to the hospital.  DO NOT DRIVE YOURSELF, [place] 911.    ACTIVITY:    For the first 24-48 hours or as instructed by the physician:  No lifting, pushing or pulling over 10 pounds and no straining the insertion site. Do not life grocery bags or the garbage can, do not run the vacuum  or  for 7 days. Start with short walks as in the hospital and gradually increase as tolerated each day. It is recommended to walk 30 minutes 5-7 days per week. Follow your physicians instructions on activity. Avoid walking outside in extremes of heat or cold. Walk inside when it is cold and windy or hot and humid. Things to keep in mind:  No driving for at least 24 hours, or as designated by your physician. Limit the number of times you go up and down the stairs  Take rests and pace yourself with activity. Be careful and do not strain with bowel movements. MEDICATIONS:    Take all medications as prescribed  Call your physician if you have any questions  Keep an updated list of your medications with you at all times and give a list to your physician and pharmacist        SIGNS AND SYMPTOMS:    Be cautious of symptoms of angina or recurrent symptoms such as chest discomfort, unusual shortness of breath or fatigue. These could be symptoms of restenosis, a new blockage or a heart attack. If your symptoms are relieved with rest it is still recommended that you notify your physician of recurrent chest pain or discomfort. FOR CHEST PAIN or symptoms of angina not relieved with rest:  If the discomfort is not relieved with rest, and you have been prescribed Nitroglycerin, take as directed (taken under the tongue, one at a time 5 minutes apart for a total of 3 doses). If the discomfort is not relieved after the 3rd nitroglycerin, call 911. If you have not been prescribed Nitroglycerin  and your chest discomfort is not relieved with rest, call 911. AFTER CARE:    Follow up with your physician as instructed.   Follow a heart healthy diet with proper portion control, daily stress management, daily exercise, blood pressure and cholesterol control , and smoking cessation. Radial Cardiac Catheterization/Angiography Discharge Instructions    It is normal to feel tired the first couple days. Take it easy and follow the physicians instructions. CHECK THE CATHETER INSERTION SITE DAILY:    Remove the wrist dressing 24 hours after the procedure. You may shower 24 hours after the procedure. Wash with soap and water and pat dry. Gentle cleaning of the site with soap and water is sufficient, cover with a dry clean dressing or bandage. Do not apply creams or powders to the area. No soaking the wrist for 3 days. Leave the puncture site open to air after 24 hours post-procedure. CALL THE PHYSICIANS:     If the site becomes red, swollen or feels warm to the touch  If there is bleeding or drainage or if there is unusual pain at the radial site. If there is any minor oozing, you may apply a band-aid and remove after 12 hours. If the bleeding continues, hold pressure with the middle finger against the puncture site and the thumb against the back of the wrist,call 911 to be transported to the hospital.  DO NOT DRIVE YOURSELF, [place] [id number]. ACTIVITY:   For the first 24 hours do not manipulate the wrist.  No lifting, pushing or pulling over 3-5 pounds with the affected wrist for 7 daysand no straining the insertion site. Do not life grocery bags or the garbage can, do not run the vacuum  or  for 7 days. Start with short walks as in the hospital and gradually increase as tolerated each day. It is recommended to walk 30 minutes 5-7 days per week. Follow your physicians instructions on activity. Avoid walking outside in extremes of heat or cold. Walk inside when it is cold and windy or hot and humid.      Things to keep in mind:  No driving for at least 24 hours, or as designated by your physician. Limit the number of times you go up and down the stairs  Take rests and pace yourself with activity. Be careful and do not strain with bowel movements. MEDICATIONS:    Take all medications as prescribed  Call your physician if you have any questions  Keep an updated list of your medications with you at all times and give a list to your physician and pharmacist    SIGNS AND SYMPTOMS:   Be cautious of symptoms of angina or recurrent symptoms such as chest discomfort, unusual shortness of breath or fatigue. These could be symptoms of restenosis, a new blockage or a heart attack. If your symptoms are relieved with rest it is still recommended that you notify your physician of recurrent chest pain or discomfort. For CHEST PAIN or symptoms of angina not relieved with rest:  If the discomfort is not relieved with rest, and you have been prescribed Nitroglycerin, take as directed (taken under the tongue, one at a time 5 minutes apart for a total of 3 doses). If the discomfort is not relieved after the 3rd nitroglycerin, call 911. If you have not been prescribed Nitroglycerin  and your chest discomfort is not relieved with rest, call 911. AFTER CARE:   Follow up with your physician as instructed. Follow a heart healthy diet with proper portion control, daily stress management, daily exercise, blood pressure and cholesterol control , and smoking cessation.

## 2018-12-06 NOTE — PROGRESS NOTES
Rubio Almeida is recovering post-procedure. L radial and R groin site dressing is CDI without swelling or bleeding or bruit. VSS. Rhythm sinus with PVCs. Rubio Almeida denies complaints at this time. If recovery continues to progress without complication, discharge is planned for later today.           Jordyn Shea NP  DNP, RN, AGACNP-BC

## 2018-12-06 NOTE — INTERVAL H&P NOTE
H&P Update:  Lupe Cedeño was seen and examined. `History and physical has been reviewed. The patient has been examined.  There have been no significant clinical changes since the completion of the originally dated History and Physical.    Signed By: Jacki Chi MD     December 6, 2018 11:28 AM

## 2018-12-11 ENCOUNTER — OFFICE VISIT (OUTPATIENT)
Dept: CARDIOLOGY CLINIC | Age: 63
End: 2018-12-11

## 2018-12-11 VITALS
RESPIRATION RATE: 16 BRPM | HEART RATE: 54 BPM | HEIGHT: 67 IN | SYSTOLIC BLOOD PRESSURE: 140 MMHG | WEIGHT: 144 LBS | DIASTOLIC BLOOD PRESSURE: 80 MMHG | OXYGEN SATURATION: 97 % | BODY MASS INDEX: 22.6 KG/M2

## 2018-12-11 DIAGNOSIS — I47.1 PSVT (PAROXYSMAL SUPRAVENTRICULAR TACHYCARDIA) (HCC): ICD-10-CM

## 2018-12-11 DIAGNOSIS — I49.5 SSS (SICK SINUS SYNDROME) (HCC): ICD-10-CM

## 2018-12-11 DIAGNOSIS — I25.10 ASHD (ARTERIOSCLEROTIC HEART DISEASE): ICD-10-CM

## 2018-12-11 DIAGNOSIS — R55 SYNCOPE, UNSPECIFIED SYNCOPE TYPE: ICD-10-CM

## 2018-12-11 DIAGNOSIS — R00.1 BRADYCARDIA: Primary | ICD-10-CM

## 2018-12-11 NOTE — H&P (VIEW-ONLY)
Subjective:      Alessandra Fields is a 59 y.o. male is here for EP consult. The patient reports 2 episodes of sycnope and recurrent night sweats. First episode was precipitated by feeling \"hot with head pressure and rapid HR\". Was \"out\" 15-20 min. The second episode was without symptoms. Recent cardiac cath with non-obstructive disease. Patient Active Problem List    Diagnosis Date Noted    S/P cardiac cath 12/06/2018    Acquired hypothyroidism 06/15/2016    ASHD (arteriosclerotic heart disease) 06/12/2015    Essential hypertension 04/28/2015    Insomnia 12/29/2014    [facility] obstructive [facility]     Coronary atherosclerosis of native coronary artery 02/22/2013    Exercise induced bronchospasm 02/22/2013    Old myocardial infarction 02/22/2013    S/P coronary artery stent placement 05/08/2012    Dyslipidemia 05/08/2012    STEMI (ST elevation myocardial infarction) ([facility] 05/05/2012    Tobacco use 05/05/2012      Danita Manzo MD  Past Medical History:   Diagnosis Date    Acute myocardial infarction, unspecified site, episode of care unspecified 2/22/2013    CAD (coronary artery disease)     Chest pain 10/15/2014    COPD [facility]chronic obstructive pulmonary disease) [facility]     Coronary atherosclerosis of native coronary artery 2/22/2013    Exercise induced bronchospasm 2/22/2013    Hypertension     Hyperthyroidism     Old myocardial infarction 2/22/2013    Tobacco use 5/5/2012      Past Surgical History:   Procedure Laterality Date    CARDIAC SURG PROCEDURE UNLIST      stents may 2012     HX CATARACT REMOVAL Left 02/02/2016     Allergies   Allergen Reactions    Bupropion Other (comments)     Urinary frequency and flatulence.     Chantix [Varenicline] Other (comments)     Trouble sleeping, soreness of mouth    Codeine Itching    Fluticasone Shortness of Breath and Swelling     possibly due to    Melatonin Myalgia    Nasonex [Mometasone] Other (comments) swelling of throat and hands possibly due to      Family History   Problem Relation Age of Onset    Cancer Mother         lung cancer    Heart Disease Father     Thyroid Disease Brother     Thyroid Disease Maternal Aunt     negative for cardiac disease  Social History     Socioeconomic History    Marital status:      Spouse name: Not on file    Number of children: Not on file    Years of education: Not on file    Highest education level: Not on file   Tobacco Use    Smoking status: Current Every Day Smoker     Packs/day: 1.00     Years: 38.00     Pack years: 38.00     Types: Cigarettes     Last attempt to quit: 3/17/2017     Years since quittin.7    Smokeless tobacco: Never Used   Substance and Sexual Activity    Alcohol use: No     Alcohol/week: 0.0 oz    Drug use: Yes     Types: Marijuana     Comment: OCC.  Sexual activity: Not Currently     Current Outpatient Medications   Medication Sig    carvedilol (COREG) 6.25 mg tablet Take 1 Tab by mouth two (2) times a day.  hydroCHLOROthiazide (HYDRODIURIL) 12.5 mg tablet Take 2 Tabs by mouth daily.  albuterol (PROAIR HFA) 90 mcg/actuation inhaler Take 2 Puffs by inhalation every six (6) hours as needed for Wheezing.  TUDORZA PRESSAIR 400 mcg/actuation inhaler INHALE ONE DOSE BY MOUTH TWICE A DAY    losartan (COZAAR) 100 mg tablet Take 1 Tab by mouth daily.  levothyroxine (SYNTHROID) 100 mcg tablet TAKE ONE TABLET BY MOUTH DAILY BEFORE BREAKFAST    pravastatin (PRAVACHOL) 40 mg tablet TAKE ONE TABLET BY MOUTH DAILY    ibuprofen (MOTRIN) 800 mg tablet Take 1 Tab by mouth every eight (8) hours as needed for Pain. Indications: Headache Disorder, Pain    budesonide (PULMICORT FLEXHALER) 180 mcg/actuation aepb inhaler INHALE TWO PUFFS BY MOUTH TWICE A DAY    nitroglycerin (NITROSTAT) 0.4 mg SL tablet 1 Tab by SubLINGual route every five (5) minutes as needed for Chest Pain.     inhalational spacing device 1 Each by Does Not Apply route as needed.  Nebulizer & Compressor machine 1 Each by Does Not Apply route every four (4) hours.  aspirin delayed-release 81 mg tablet Take 1 Tab by mouth daily. No current facility-administered medications for this visit. Vitals:    12/11/18 0924   BP: 140/80   Pulse: (!) 54   Resp: 16   SpO2: 97%   Weight: 144 lb (65.3 kg)   Height: 5' 7\" (1.702 m)       I have reviewed the nurses notes, vitals, problem list, allergy list, medical history, family, social history and medications. Review of Symptoms:    General: Pt denies excessive weight gain or loss. Pt is able to conduct ADL's  HEENT: Denies blurred vision, headaches, epistaxis and difficulty swallowing. Respiratory: Denies shortness of breath, ANGEL, wheezing or stridor. Cardiovascular: Denies precordial pain, edema or PND. Reports  Palpitations. Gastrointestinal: Denies poor appetite, indigestion, abdominal pain or blood in stool  Urinary: Denies dysuria, pyuria  Musculoskeletal: Denies pain or swelling from muscles or joints  Neurologic: Denies tremor, paresthesias, or sensory motor disturbance. Reports syncope. Skin: Denies rash, itching or texture change. Psych: Denies depression      Physical Exam:      General: Well developed, in no acute distress. HEENT: Eyes - PERRL, no jvd  Heart:  Normal S1/S2 negative S3 or S4. Regular, no murmur, gallop or rub.   Respiratory: Clear bilaterally x 4, no wheezing or rales  Extremities:  No edema, normal cap refill, no cyanosis. Musculoskeletal: No clubbing  Neuro: A&Ox3, speech clear, gait stable. Skin: Skin color is normal. No rashes or lesions. Non diaphoretic  Vascular: 2+ pulses symmetric in all extremities    Cardiographics    Ekg Sinus  Bradycardia   -Inferior infarct -age undetermined.  Ventricular rate 53    Results for orders placed or performed in visit on 11/26/18   CARDIAC HOLTER MONITOR, 24 HOURS    Narrative    ECG Monitor/24 hours, Complete    Reason for Holter Monitor  SYNCOPE    Heartbeat    Slowest 43  Average 60  Fastest  94        Results:   Underlying Rhythm: Normal sinus rhythm      Atrial Arrhythmias: severe bradycardia, PSVT         AV Conduction: normal    Ventricular Arrhythmias: premature ventricular contractions; occasional     ST Segment Analysis:normal     Symptom Correlation:  None reported    Comment:   Sinus rhythm with PSVT and episodes of profound bradycardia to 40 bpm. Clinical correlation advised. Claudell Rodney, MD, [facility]      Results for orders placed or performed during the hospital encounter of 03/19/17   EKG, 12 LEAD, INITIAL   Result Value Ref Range    Ventricular Rate 82 BPM    Atrial Rate 82 BPM    P-R Interval 136 ms    QRS Duration 94 ms    Q-T Interval 384 ms    QTC Calculation (Bezet) 448 ms    Calculated P Axis 61 degrees    Calculated R Axis 76 degrees    Calculated T Axis 63 degrees    Diagnosis       Normal sinus rhythm  Normal ECG  Confirmed by Jose Guadalupe Gudino ([id number]) on 3/20/2017 8:10:32 AM           Lab Results   Component Value Date/Time    WBC 9.2 12/05/2018 10:26 AM    HGB 14.3 12/05/2018 10:26 AM    HCT 40.8 12/05/2018 10:26 AM    PLATELET 696 34/00/7869 10:26 AM    MCV 94 12/05/2018 10:26 AM      Lab Results   Component Value Date/Time    Sodium 139 12/05/2018 10:26 AM    Potassium 4.3 12/05/2018 10:26 AM    Chloride 98 12/05/2018 10:26 AM    CO2 28 12/05/2018 10:26 AM    Anion gap 7 03/19/2017 09:02 PM    Glucose 82 12/05/2018 10:26 AM    BUN 10 12/05/2018 10:26 AM    Creatinine 0.79 12/05/2018 10:26 AM    BUN/Creatinine ratio 13 12/05/2018 10:26 AM    GFR est  12/05/2018 10:26 AM    GFR est non-AA 96 12/05/2018 10:26 AM    Calcium 9.1 12/05/2018 10:26 AM    Bilirubin, total 0.2 12/05/2018 10:26 AM    AST (SGOT) 23 12/05/2018 10:26 AM    Alk.  phosphatase 86 12/05/2018 10:26 AM    Protein, total 6.3 12/05/2018 10:26 AM    Albumin 3.9 12/05/2018 10:26 AM    Globulin 3.8 03/19/2017 09:02 PM    A-G Ratio 1.6 12/05/2018 10:26 AM    ALT (SGPT) 20 12/05/2018 10:26 AM      Lab Results   Component Value Date/Time    TSH 3.810 05/31/2017 10:40 AM        Assessment:             ICD-10-CM ICD-9-CM    1. Bradycardia R00.1 427.89 AMB POC EKG ROUTINE W/ 12 LEADS, INTER & REP      METABOLIC PANEL, COMPREHENSIVE      CBC W/O DIFF      PROTHROMBIN TIME + INR      XR CHEST PA LAT   2. ASHD (arteriosclerotic heart disease) L33.92 706.92 METABOLIC PANEL, COMPREHENSIVE      CBC W/O DIFF      PROTHROMBIN TIME + INR      XR CHEST PA LAT   3. Syncope, unspecified syncope type T51 598.8 METABOLIC PANEL, COMPREHENSIVE      CBC W/O DIFF      PROTHROMBIN TIME + INR      XR CHEST PA LAT   4. PSVT (paroxysmal supraventricular tachycardia) ([facility]) K63.8 782.3 METABOLIC PANEL, COMPREHENSIVE      CBC W/O DIFF      PROTHROMBIN TIME + INR      XR CHEST PA LAT   5. SSS (sick sinus syndrome) ([facility]) I49.5 427.81      Orders Placed This Encounter    XR CHEST PA LAT     Standing Status:   Future     Standing Expiration Date:   1/11/2020     Order Specific Question:   Reason for Exam     Answer:   sss, syncope    METABOLIC PANEL, COMPREHENSIVE    CBC W/O DIFF    PROTHROMBIN TIME + INR    AMB POC EKG ROUTINE W/ 12 LEADS, INTER & REP     Order Specific Question:   Reason for Exam:     Answer:   routine        Plan:     Mr Marcio Shaffer is here for eval of syncope. His holter was with sinus rhythm with PSVT and episodes of profound bradycardia to 40 bpm. He is a candidate for dual chamber PM. I discussed the risks/benefits/alternatives of the procedure with the patient. Risks include (but are not limited to) bleeding, heart block, infection, cva/mi/tamponade/death. The patient understands and agrees to proceed. Thank you for this interesting consultation. Continue medical management for ASHD, PSVT and syncope. Thank you for allowing me to participate in Misael Green 's care. Tanvi Little NP    Patient seen and examined. All pertinent data reviewed.  I have reviewed detailed note as outlined by Xin Canchola NP. Case discussed with Nursing/medical assistant staff and Xin Canchola NP. Plans as outlined. Syncope with sick sinus syndrome noted on event monitor.  Candidate for dc ppm. Pt understands and agrees to proceed    Ivan Palomares MD, Tiera Sandoval

## 2018-12-11 NOTE — PROGRESS NOTES
Patient referred by Dr Roland Foy due to bradycardia and evaluation for pacemaker. He complains of night sweats.

## 2018-12-11 NOTE — PROGRESS NOTES
Subjective:      Alonna Phalen is a 59 y.o. male is here for EP consult. The patient reports 2 episodes of sycnope and recurrent night sweats. First episode was precipitated by feeling \"hot with head pressure and rapid HR\". Was \"out\" 15-20 min. The second episode was without symptoms. Recent cardiac cath with non-obstructive disease. Patient Active Problem List    Diagnosis Date Noted    S/P cardiac cath 12/06/2018    Acquired hypothyroidism 06/15/2016    ASHD (arteriosclerotic heart disease) 06/12/2015    Essential hypertension 04/28/2015    Insomnia 12/29/2014    COPD [facility] obstructive [facility] disease) [facility]     Coronary atherosclerosis of native coronary artery 02/22/2013    Exercise induced bronchospasm 02/22/2013    Old myocardial infarction 02/22/2013    S/P coronary artery stent placement 05/08/2012    Dyslipidemia 05/08/2012    STEMI (ST elevation myocardial infarction) ([facility] 05/05/2012    Tobacco use 05/05/2012      Floresita Stacy MD  Past Medical History:   Diagnosis Date    Acute myocardial infarction, unspecified site, episode of care unspecified 2/22/2013    CAD (coronary artery disease)     Chest pain 10/15/2014    COPD (chronic obstructive pulmonary disease) [facility]     Coronary atherosclerosis of native coronary artery 2/22/2013    Exercise induced bronchospasm 2/22/2013    Hypertension     Hyperthyroidism     Old myocardial infarction 2/22/2013    Tobacco use 5/5/2012      Past Surgical History:   Procedure Laterality Date    CARDIAC SURG PROCEDURE UNLIST      stents may 2012     HX CATARACT REMOVAL Left 02/02/2016     Allergies   Allergen Reactions    Bupropion Other (comments)     Urinary frequency and flatulence.     Chantix [Varenicline] Other (comments)     Trouble sleeping, soreness of mouth    Codeine Itching    Fluticasone Shortness of Breath and Swelling     possibly due to    Melatonin Myalgia    Nasonex [Mometasone] Other (comments) swelling of throat and hands possibly due to      Family History   Problem Relation Age of Onset    Cancer Mother         lung cancer    Heart Disease Father     Thyroid Disease Brother     Thyroid Disease Maternal Aunt     negative for cardiac disease  Social History     Socioeconomic History    Marital status:      Spouse name: Not on file    Number of children: Not on file    Years of education: Not on file    Highest education level: Not on file   Tobacco Use    Smoking status: Current Every Day Smoker     Packs/day: 1.00     Years: 38.00     Pack years: 38.00     Types: Cigarettes     Last attempt to quit: 3/17/2017     Years since quittin.7    Smokeless tobacco: Never Used   Substance and Sexual Activity    Alcohol use: No     Alcohol/week: 0.0 oz    Drug use: Yes     Types: Marijuana     Comment: OCC.  Sexual activity: Not Currently     Current Outpatient Medications   Medication Sig    carvedilol (COREG) 6.25 mg tablet Take 1 Tab by mouth two (2) times a day.  hydroCHLOROthiazide (HYDRODIURIL) 12.5 mg tablet Take 2 Tabs by mouth daily.  albuterol (PROAIR HFA) 90 mcg/actuation inhaler Take 2 Puffs by inhalation every six (6) hours as needed for Wheezing.  TUDORZA PRESSAIR 400 mcg/actuation inhaler INHALE ONE DOSE BY MOUTH TWICE A DAY    losartan (COZAAR) 100 mg tablet Take 1 Tab by mouth daily.  levothyroxine (SYNTHROID) 100 mcg tablet TAKE ONE TABLET BY MOUTH DAILY BEFORE BREAKFAST    pravastatin (PRAVACHOL) 40 mg tablet TAKE ONE TABLET BY MOUTH DAILY    ibuprofen (MOTRIN) 800 mg tablet Take 1 Tab by mouth every eight (8) hours as needed for Pain. Indications: Headache Disorder, Pain    budesonide (PULMICORT FLEXHALER) 180 mcg/actuation aepb inhaler INHALE TWO PUFFS BY MOUTH TWICE A DAY    nitroglycerin (NITROSTAT) 0.4 mg SL tablet 1 Tab by SubLINGual route every five (5) minutes as needed for Chest Pain.     inhalational spacing device 1 Each by Does Not Apply route as needed.  Nebulizer & Compressor machine 1 Each by Does Not Apply route every four (4) hours.  aspirin delayed-release 81 mg tablet Take 1 Tab by mouth daily. No current facility-administered medications for this visit. Vitals:    12/11/18 0924   BP: 140/80   Pulse: (!) 54   Resp: 16   SpO2: 97%   Weight: 144 lb (65.3 kg)   Height: 5' 7\" (1.702 m)       I have reviewed the nurses notes, vitals, problem list, allergy list, medical history, family, social history and medications. Review of Symptoms:    General: Pt denies excessive weight gain or loss. Pt is able to conduct ADL's  HEENT: Denies blurred vision, headaches, epistaxis and difficulty swallowing. Respiratory: Denies shortness of breath, ANGEL, wheezing or stridor. Cardiovascular: Denies precordial pain, edema or PND. Reports  Palpitations. Gastrointestinal: Denies poor appetite, indigestion, abdominal pain or blood in stool  Urinary: Denies dysuria, pyuria  Musculoskeletal: Denies pain or swelling from muscles or joints  Neurologic: Denies tremor, paresthesias, or sensory motor disturbance. Reports syncope. Skin: Denies rash, itching or texture change. Psych: Denies depression      Physical Exam:      General: Well developed, in no acute distress. HEENT: Eyes - PERRL, no jvd  Heart:  Normal S1/S2 negative S3 or S4. Regular, no murmur, gallop or rub.   Respiratory: Clear bilaterally x 4, no wheezing or rales  Extremities:  No edema, normal cap refill, no cyanosis. Musculoskeletal: No clubbing  Neuro: A&Ox3, speech clear, gait stable. Skin: Skin color is normal. No rashes or lesions. Non diaphoretic  Vascular: 2+ pulses symmetric in all extremities    Cardiographics    Ekg Sinus  Bradycardia   -Inferior infarct -age undetermined.  Ventricular rate 53    Results for orders placed or performed in visit on 11/26/18   CARDIAC HOLTER MONITOR, 24 HOURS    Narrative    ECG Monitor/24 hours, Complete    Reason for Holter Monitor  SYNCOPE    Heartbeat    Slowest 43  Average 60  Fastest  94        Results:   Underlying Rhythm: Normal sinus rhythm      Atrial Arrhythmias: severe bradycardia, PSVT         AV Conduction: normal    Ventricular Arrhythmias: premature ventricular contractions; occasional     ST Segment Analysis:normal     Symptom Correlation:  None reported    Comment:   Sinus rhythm with PSVT and episodes of profound bradycardia to 40 bpm. Clinical correlation advised. Nancy Dorado MD, Fito Hernandez      Results for orders placed or performed during the hospital encounter of 03/19/17   EKG, 12 LEAD, INITIAL   Result Value Ref Range    Ventricular Rate 82 BPM    Atrial Rate 82 BPM    P-R Interval 136 ms    QRS Duration 94 ms    Q-T Interval 384 ms    QTC Calculation (Bezet) 448 ms    Calculated P Axis 61 degrees    Calculated R Axis 76 degrees    Calculated T Axis 63 degrees    Diagnosis       Normal sinus rhythm  Normal ECG  Confirmed by Jina Teran ([id number]) on 3/20/2017 8:10:32 AM           Lab Results   Component Value Date/Time    WBC 9.2 12/05/2018 10:26 AM    HGB 14.3 12/05/2018 10:26 AM    HCT 40.8 12/05/2018 10:26 AM    PLATELET 114 02/14/5470 10:26 AM    MCV 94 12/05/2018 10:26 AM      Lab Results   Component Value Date/Time    Sodium 139 12/05/2018 10:26 AM    Potassium 4.3 12/05/2018 10:26 AM    Chloride 98 12/05/2018 10:26 AM    CO2 28 12/05/2018 10:26 AM    Anion gap 7 03/19/2017 09:02 PM    Glucose 82 12/05/2018 10:26 AM    BUN 10 12/05/2018 10:26 AM    Creatinine 0.79 12/05/2018 10:26 AM    BUN/Creatinine ratio 13 12/05/2018 10:26 AM    GFR est  12/05/2018 10:26 AM    GFR est non-AA 96 12/05/2018 10:26 AM    Calcium 9.1 12/05/2018 10:26 AM    Bilirubin, total 0.2 12/05/2018 10:26 AM    AST (SGOT) 23 12/05/2018 10:26 AM    Alk.  phosphatase 86 12/05/2018 10:26 AM    Protein, total 6.3 12/05/2018 10:26 AM    Albumin 3.9 12/05/2018 10:26 AM    Globulin 3.8 03/19/2017 09:02 PM    A-G Ratio 1.6 12/05/2018 10:26 AM    ALT (SGPT) 20 12/05/2018 10:26 AM      Lab Results   Component Value Date/Time    TSH 3.810 05/31/2017 10:40 AM        Assessment:             ICD-10-CM ICD-9-CM    1. Bradycardia R00.1 427.89 AMB POC EKG ROUTINE W/ 12 LEADS, INTER & REP      METABOLIC PANEL, COMPREHENSIVE      CBC W/O DIFF      PROTHROMBIN TIME + INR      XR CHEST PA LAT   2. ASHD (arteriosclerotic heart disease) N58.76 029.38 METABOLIC PANEL, COMPREHENSIVE      CBC W/O DIFF      PROTHROMBIN TIME + INR      XR CHEST PA LAT   3. Syncope, unspecified syncope type X27 160.5 METABOLIC PANEL, COMPREHENSIVE      CBC W/O DIFF      PROTHROMBIN TIME + INR      XR CHEST PA LAT   4. PSVT (paroxysmal supraventricular tachycardia) ([facility]) P44.4 422.4 METABOLIC PANEL, COMPREHENSIVE      CBC W/O DIFF      PROTHROMBIN TIME + INR      XR CHEST PA LAT   5. SSS (sick sinus syndrome) ([facility]) I49.5 427.81      Orders Placed This Encounter    XR CHEST PA LAT     Standing Status:   Future     Standing Expiration Date:   1/11/2020     Order Specific Question:   Reason for Exam     Answer:   sss, syncope    METABOLIC PANEL, COMPREHENSIVE    CBC W/O DIFF    PROTHROMBIN TIME + INR    AMB POC EKG ROUTINE W/ 12 LEADS, INTER & REP     Order Specific Question:   Reason for Exam:     Answer:   routine        Plan:     Mr Mc Mendenhall is here for eval of syncope. His holter was with sinus rhythm with PSVT and episodes of profound bradycardia to 40 bpm. He is a candidate for dual chamber PM. I discussed the risks/benefits/alternatives of the procedure with the patient. Risks include (but are not limited to) bleeding, heart block, infection, cva/mi/tamponade/death. The patient understands and agrees to proceed. Thank you for this interesting consultation. Continue medical management for ASHD, PSVT and syncope. Thank you for allowing me to participate in Guzman Members 's care. Jong Ventura NP    Patient seen and examined. All pertinent data reviewed.  I have reviewed detailed note as outlined by Sandro Maldonado NP. Case discussed with Nursing/medical assistant staff and Sandro Maldonado NP. Plans as outlined. Syncope with sick sinus syndrome noted on event monitor.  Candidate for dc ppm. Pt understands and agrees to proceed    Chriss Mazariegos MD, Jaxon Brannon

## 2018-12-18 ENCOUNTER — HOSPITAL ENCOUNTER (OUTPATIENT)
Dept: GENERAL RADIOLOGY | Age: 63
Discharge: HOME | End: 2018-12-18
Payer: MEDICAID

## 2018-12-18 DIAGNOSIS — R55 SYNCOPE: ICD-10-CM

## 2018-12-18 PROCEDURE — 71046 X-RAY EXAM CHEST 2 VIEWS: CPT

## 2018-12-19 LAB
ALBUMIN SERPL-MCNC: 4.7 G/DL (ref 3.6–4.8)
ALBUMIN/GLOB SERPL: 1.6 {RATIO} (ref 1.2–2.2)
ALP SERPL-CCNC: 101 IU/L (ref 39–117)
ALT SERPL-CCNC: 22 IU/L (ref 0–44)
AST SERPL-CCNC: 24 IU/L (ref 0–40)
BILIRUB SERPL-MCNC: 0.5 MG/DL (ref 0–1.2)
BUN SERPL-MCNC: 16 MG/DL (ref 8–27)
BUN/CREAT SERPL: 16 (ref 10–24)
CALCIUM SERPL-MCNC: 10.1 MG/DL (ref 8.6–10.2)
CHLORIDE SERPL-SCNC: 93 MMOL/L (ref 96–106)
CO2 SERPL-SCNC: 28 MMOL/L (ref 20–29)
CREAT SERPL-MCNC: 0.98 MG/DL (ref 0.76–1.27)
ERYTHROCYTE [DISTWIDTH] IN BLOOD BY AUTOMATED COUNT: 14.3 % (ref 12.3–15.4)
GLOBULIN SER CALC-MCNC: 2.9 G/DL (ref 1.5–4.5)
GLUCOSE SERPL-MCNC: 105 MG/DL (ref 65–99)
HCT VFR BLD AUTO: 46.7 % (ref 37.5–51)
HGB BLD-MCNC: 15.6 G/DL (ref 13–17.7)
INR PPP: 1.1 (ref 0.8–1.2)
MCH RBC QN AUTO: 32 PG (ref 26.6–33)
MCHC RBC AUTO-ENTMCNC: 33.4 G/DL (ref 31.5–35.7)
MCV RBC AUTO: 96 FL (ref 79–97)
PLATELET # BLD AUTO: 235 X10E3/UL (ref 150–379)
POTASSIUM SERPL-SCNC: 4.7 MMOL/L (ref 3.5–5.2)
PROT SERPL-MCNC: 7.6 G/DL (ref 6–8.5)
PROTHROMBIN TIME: 11 SEC (ref 9.1–12)
RBC # BLD AUTO: 4.88 X10E6/UL (ref 4.14–5.8)
SODIUM SERPL-SCNC: 137 MMOL/L (ref 134–144)
WBC # BLD AUTO: 9.7 X10E3/UL (ref 3.4–10.8)

## 2018-12-20 DIAGNOSIS — E03.9 ACQUIRED HYPOTHYROIDISM: ICD-10-CM

## 2018-12-20 RX ORDER — LEVOTHYROXINE SODIUM 100 UG/1
TABLET ORAL
Qty: 30 TAB | Refills: 2 | Status: SHIPPED | OUTPATIENT
Start: 2018-12-20 | End: 2019-01-10 | Stop reason: SDUPTHER

## 2018-12-26 ENCOUNTER — HOSPITAL ENCOUNTER (OUTPATIENT)
Dept: NON INVASIVE DIAGNOSTICS | Age: 63
Discharge: HOME | End: 2018-12-26
Attending: INTERNAL MEDICINE | Admitting: INTERNAL MEDICINE
Payer: MEDICAID

## 2018-12-26 ENCOUNTER — APPOINTMENT (OUTPATIENT)
Dept: GENERAL RADIOLOGY | Age: 63
End: 2018-12-26
Attending: INTERNAL MEDICINE
Payer: MEDICAID

## 2018-12-26 VITALS
SYSTOLIC BLOOD PRESSURE: 111 MMHG | WEIGHT: 145 LBS | BODY MASS INDEX: 22.76 KG/M2 | RESPIRATION RATE: 19 BRPM | HEART RATE: 63 BPM | OXYGEN SATURATION: 94 % | DIASTOLIC BLOOD PRESSURE: 76 MMHG | HEIGHT: 67 IN | TEMPERATURE: 98.1 F

## 2018-12-26 PROBLEM — I49.5 SSS (SICK SINUS SYNDROME) (HCC): Status: ACTIVE | Noted: 2018-12-26

## 2018-12-26 PROCEDURE — C1894 INTRO/SHEATH, NON-LASER: HCPCS

## 2018-12-26 PROCEDURE — C1898 LEAD, PMKR, OTHER THAN TRANS: HCPCS

## 2018-12-26 PROCEDURE — 77030002996 HC SUT SLK J&J -A

## 2018-12-26 PROCEDURE — 71045 X-RAY EXAM CHEST 1 VIEW: CPT

## 2018-12-26 PROCEDURE — 77030018673

## 2018-12-26 PROCEDURE — 77030018836 HC SOL IRR NACL ICUM -A

## 2018-12-26 PROCEDURE — 74011250636 HC RX REV CODE- 250/636

## 2018-12-26 PROCEDURE — 77030031139 HC SUT VCRL2 J&J -A

## 2018-12-26 PROCEDURE — 77030037713 HC CLOSR DEV INCIS ZIP STRY -B

## 2018-12-26 PROCEDURE — 77030018729 HC ELECTRD DEFIB PAD CARD -B

## 2018-12-26 PROCEDURE — 99153 MOD SED SAME PHYS/QHP EA: CPT

## 2018-12-26 PROCEDURE — A4565 SLINGS: HCPCS

## 2018-12-26 PROCEDURE — 74011636320 HC RX REV CODE- 636/320: Performed by: INTERNAL MEDICINE

## 2018-12-26 PROCEDURE — C1785 PMKR, DUAL, RATE-RESP: HCPCS

## 2018-12-26 PROCEDURE — 74011250636 HC RX REV CODE- 250/636: Performed by: INTERNAL MEDICINE

## 2018-12-26 PROCEDURE — C1893 INTRO/SHEATH, FIXED,NON-PEEL: HCPCS

## 2018-12-26 PROCEDURE — 74011000250 HC RX REV CODE- 250

## 2018-12-26 RX ORDER — CEFAZOLIN SODIUM/WATER 2 G/20 ML
SYRINGE (ML) INTRAVENOUS
Status: COMPLETED
Start: 2018-12-26 | End: 2018-12-26

## 2018-12-26 RX ORDER — BACITRACIN 50000 [IU]/1
INJECTION, POWDER, FOR SOLUTION INTRAMUSCULAR
Status: COMPLETED
Start: 2018-12-26 | End: 2018-12-26

## 2018-12-26 RX ORDER — FENTANYL CITRATE 50 UG/ML
12.5-5 INJECTION, SOLUTION INTRAMUSCULAR; INTRAVENOUS
Status: DISCONTINUED | OUTPATIENT
Start: 2018-12-26 | End: 2018-12-26 | Stop reason: HOSPADM

## 2018-12-26 RX ORDER — LIDOCAINE HYDROCHLORIDE 10 MG/ML
1-40 INJECTION INFILTRATION; PERINEURAL
Status: DISCONTINUED | OUTPATIENT
Start: 2018-12-26 | End: 2018-12-26 | Stop reason: HOSPADM

## 2018-12-26 RX ORDER — SODIUM CHLORIDE 0.9 % (FLUSH) 0.9 %
5-10 SYRINGE (ML) INJECTION EVERY 8 HOURS
Status: DISCONTINUED | OUTPATIENT
Start: 2018-12-26 | End: 2018-12-26 | Stop reason: HOSPADM

## 2018-12-26 RX ORDER — LIDOCAINE HYDROCHLORIDE 10 MG/ML
INJECTION, SOLUTION EPIDURAL; INFILTRATION; INTRACAUDAL; PERINEURAL
Status: COMPLETED
Start: 2018-12-26 | End: 2018-12-26

## 2018-12-26 RX ORDER — MIDAZOLAM HYDROCHLORIDE 1 MG/ML
1-5 INJECTION, SOLUTION INTRAMUSCULAR; INTRAVENOUS
Status: DISCONTINUED | OUTPATIENT
Start: 2018-12-26 | End: 2018-12-26 | Stop reason: HOSPADM

## 2018-12-26 RX ORDER — SODIUM CHLORIDE 0.9 % (FLUSH) 0.9 %
5-10 SYRINGE (ML) INJECTION AS NEEDED
Status: DISCONTINUED | OUTPATIENT
Start: 2018-12-26 | End: 2018-12-26 | Stop reason: HOSPADM

## 2018-12-26 RX ORDER — BACITRACIN 50000 [IU]/1
50000 INJECTION, POWDER, FOR SOLUTION INTRAMUSCULAR ONCE
Status: COMPLETED | OUTPATIENT
Start: 2018-12-26 | End: 2018-12-26

## 2018-12-26 RX ORDER — HEPARIN SODIUM 200 [USP'U]/100ML
500 INJECTION, SOLUTION INTRAVENOUS ONCE
Status: COMPLETED | OUTPATIENT
Start: 2018-12-26 | End: 2018-12-26

## 2018-12-26 RX ORDER — NALOXONE HYDROCHLORIDE 0.4 MG/ML
0.4 INJECTION, SOLUTION INTRAMUSCULAR; INTRAVENOUS; SUBCUTANEOUS AS NEEDED
Status: DISCONTINUED | OUTPATIENT
Start: 2018-12-26 | End: 2018-12-26 | Stop reason: HOSPADM

## 2018-12-26 RX ORDER — CEFAZOLIN SODIUM/WATER 2 G/20 ML
2 SYRINGE (ML) INTRAVENOUS ONCE
Status: COMPLETED | OUTPATIENT
Start: 2018-12-26 | End: 2018-12-26

## 2018-12-26 RX ADMIN — Medication 2 G: at 08:05

## 2018-12-26 RX ADMIN — BACITRACIN 50000 UNITS: 50000 INJECTION, POWDER, FOR SOLUTION INTRAMUSCULAR at 08:36

## 2018-12-26 RX ADMIN — MIDAZOLAM HYDROCHLORIDE 2 MG: 1 INJECTION, SOLUTION INTRAMUSCULAR; INTRAVENOUS at 07:55

## 2018-12-26 RX ADMIN — MIDAZOLAM HYDROCHLORIDE 1 MG: 1 INJECTION, SOLUTION INTRAMUSCULAR; INTRAVENOUS at 08:20

## 2018-12-26 RX ADMIN — MIDAZOLAM HYDROCHLORIDE 2 MG: 1 INJECTION, SOLUTION INTRAMUSCULAR; INTRAVENOUS at 08:17

## 2018-12-26 RX ADMIN — MIDAZOLAM HYDROCHLORIDE 2 MG: 1 INJECTION, SOLUTION INTRAMUSCULAR; INTRAVENOUS at 08:05

## 2018-12-26 RX ADMIN — IOPAMIDOL 15 ML: 755 INJECTION, SOLUTION INTRAVENOUS at 08:24

## 2018-12-26 RX ADMIN — FENTANYL CITRATE 50 MCG: 50 INJECTION, SOLUTION INTRAMUSCULAR; INTRAVENOUS at 08:15

## 2018-12-26 RX ADMIN — HEPARIN SODIUM 1000 UNITS: 200 INJECTION, SOLUTION INTRAVENOUS at 08:17

## 2018-12-26 RX ADMIN — LIDOCAINE HYDROCHLORIDE 30 ML: 10 INJECTION, SOLUTION EPIDURAL; INFILTRATION; INTRACAUDAL; PERINEURAL at 08:18

## 2018-12-26 RX ADMIN — FENTANYL CITRATE 50 MCG: 50 INJECTION, SOLUTION INTRAMUSCULAR; INTRAVENOUS at 08:00

## 2018-12-26 RX ADMIN — BACITRACIN 50000 UNITS: 5000 INJECTION, POWDER, FOR SOLUTION INTRAMUSCULAR at 08:36

## 2018-12-26 NOTE — PROGRESS NOTES
Ambulate with pt in sharma to BR. Gait steady. Left anterior chest wall dressing dry and intact. Pt denies c/o at present. DC instructions reviewed with pt and sister. Both verbalize understanding. SL dc'd without difficulty. Pt dc'd to home with sister jayson schulte.

## 2018-12-26 NOTE — PROCEDURES
[address] [address]  [phone]    Indications and Pre-Procedure Diagnosis:  Augustus Bagley is a 59 y.o. male with sick sinus syndrome is referred for dual chamber pacemaker. Post Procedure Diagnosis:    Sick sinus syndrome    Pacemaker Implant Procedure and Findings:  Informed consent was obtained and the patient was premedicated with cefazolin. The procedure was performed under local anesthesia. Continuous pulse oximetry and cuff pressure were monitored. During the procedure, the patient received Versed and Fentanyl for sedation per nursing personnel. The left deltopectoral area was prepped and draped in the usual sterile fashion and was liberally infiltrated with 1% lidocaine. An incision was made over the left subpectoral area and a generator pocket was manually dissected. Access was achieved in the left axillary vein under fluoroscopic guidance and using the seldinger technique. Through the left axillary vein, pacing leads were positioned in appropriate regions in the right heart chambers where satisfactory pacing and sensing parameters were measured. Stability of the leads was assessed with deep breathing and there was no diaphragmatic pacing at 10V output. The leads were anchored using the sleeves and a pulse generator pocket fashioned using blunt dissection. The leads were then connected to the pulse generator. The pulse generator pocket was then liberally infiltrated with bacitracin solution, and the device implanted with a single silk fixation suture in the header to prevent migration. The wound was closed in layers using intermittent 2-0 Vicryl and Zipline suture sleeve. A bio-occlusive dressing were applied to the skin. Fluoroscopy and total procedure times were 3 and 30 minutes respectively. Estimated blood loss <10 ml. Sharp count: correct. Specimen(s) collected: none. The following procedure related complication occurred: none.  The following problems were encountered: none. Findings: successful pacemaker placement. Device Data Measurements:  Lead Sensing (mV) Threshold (V)Pulse Width (ms) Impedance (Ohms)  RA 2.1  0.6  0.5   707  RV 8.7  0.9  0.5   961      Final Programmed Parameters  Bradycardia pacing rate  60 bpm  Pacing Mode    AAIR-DDDR  Pacing Output    3.5 V@ 0.5 ms    Supplies Summary available in the chart  [employer]    Thank you for allowing me to participate in this patients care.     Gallo Alexandre MD, Matilde Rosenberg

## 2018-12-26 NOTE — INTERVAL H&P NOTE
H&P Update:  Em Trejo was seen and examined. History and physical has been reviewed. The patient has been examined.  There have been no significant clinical changes since the completion of the originally dated History and Physical.    Signed By: Meenakshi Conrad MD     December 26, 2018 8:12 AM

## 2018-12-26 NOTE — DISCHARGE INSTRUCTIONS
[address] [place] [address], [address] [place] [address], [address] [phone]        NEW PACEMAKER IMPLANT DISCHARGE INSTRUCTIONS    Patient ID:  Mendel Centers  [id number]  33 y.o.  1955    Admit Date: 12/26/2018    Discharge Date: 12/26/2018     Admitting Physician: Rachana Ballard MD     Discharge Physician: Rachana Ballard MD    Admission Diagnoses:   SSS  SSS (sick sinus syndrome) [facility])    Discharge Diagnoses: Active Problems:    SSS (sick sinus syndrome) ([address] (12/26/2018)        Discharge Condition: Good    Cardiology Procedures this Admission:  Pacemaker insertion. Disposition: home    Reference discharge instructions provided by nursing for diet and activity. Follow-up with device clinic in two weeks. Call [phone] to make an appointment. Signed:  Rachana Ballard MD  12/26/2018  8:44 AM      DISCHARGE INSTRUCTIONS FOR PATIENTS WITH PACEMAKERS    1. Remember to call for an appointment for 2 weeks [phone] to check healing and implant programming. 2. Medic Alert Bracelets are available from your pharmacist to wear at all times if you choose to wear one. 3. Carry your ID card for pacemaker with you at all times. This card will be given to you in the hospital or mailed to you. 4. The pacemaker will bulge slightly under your skin. The bulge will decrease in size over the next few weeks. Please notify the doctor's office if you notice any of the following around your site:   A.  A bruise that does not go away. B.  Soreness or yellow, green, or brown drainage from the site. C. Any swelling from the site. D. If you have a fever of 100 degrees or higher that lasts for a few days. INCISION CARE       1.  Leave dressing over your site until it starts to fall off, usually in a few weeks. 2.  You may shower after 3 days as long as your incision isnt submerged or directly sprayed upon until well healed. 3.  For comfort, wear loose fitting clothing.   4.  Report any signs of infection, fever, pain, swelling, redness, oozing, or heat at site especially if these symptoms increase after the first 3 to 4 days. ACTIVITY PRECAUTIONS     1. Avoid rough contact with the implant site. 2. No driving for 14 days. 3. Avoid lifting your arm over your head, carrying anything on the affected side, or lifting over 10 pounds for 30 days. For the first 2 days only bend your arm at the elbow. 4. Any extreme activity such as golf, weight lifting or exercise biking should be restricted for 60 days. 5. Do not carry objects by holding them against your implant site. 6.  No shooting rifles or any type of gun with the affected shoulder permanently. SPECIAL PRECAUTIONS     1. You should avoid all strong magnetic fields, such as arc welding, large transformers, large motors. 2.  You may or may not (depending on your device) have an MRI which uses a strong magnet to take pictures. 3.  Treatments or surgery that requires diathermy or electrocautery should be discussed with your doctor before scheduled. 4. Avoid radio frequency transmitters, including radar. 5. Advise dentist or other medical personnel you see that you have a pacemaker. 6.  Cell phones and microwave oven use is okay. 7.  If you plan to move or take a trip to a new area, the doctor's office will give you a name of a doctor to contact for any problems. ANTIBIOTIC THERAPY    During the first 8 weeks after your pacemaker insertion, you may need antibiotics before any dental work or certain tests or operations. Let the dentist or doctor who is caring for you know that you have had an implanted device.

## 2018-12-26 NOTE — PROGRESS NOTES
Cardiac Cath Lab Recovery Arrival Note:      Zoya Fu arrived to Cardiac Cath Lab, Recovery Area. Staff introduced to patient. Patient identifiers verified with NAME and DATE OF BIRTH. Procedure verified with patient. Consent forms reviewed and signed by patient or authorized representative and verified. Allergies verified. Patient and family oriented to department. Patient and family informed of procedure and plan of care. Questions answered with review. Patient prepped for procedure, per orders from physician, prior to arrival.    Patient on cardiac monitor, non-invasive blood pressure, SPO2 monitor. On room air. Patient is A&Ox 3. Patient reports no c/o. Patient in stretcher, in low position, with side rails up, call bell within reach, patient instructed to call if assistance as needed. Patient prep in: [address], [place] [address]. Patient family has pager # none  Family in: [facility] waiting area.    Prep by: Misael Yi RN

## 2018-12-26 NOTE — PROGRESS NOTES
TRANSFER - IN REPORT:    Verbal report received from Jono Underwood RN on [facility]  being received from EP for routine progression of care. Report consisted of patients Situation, Background, Assessment and Recommendations(SBAR). Information from the following report(s) Procedure Summary and MAR was reviewed with the receiving clinician. Opportunity for questions and clarification was provided. Assessment completed upon patients arrival to [address] [place] [address] and care assumed. Cardiac Cath Lab Recovery Arrival Note:    [address] [place] [address] arrived to [facility] recovery area. Patient procedure= PPI. Patient on cardiac monitor, non-invasive blood pressure, SPO2 monitor. On O2 @ 2 lpm via nc. IV  of ns on pump at 50 ml/hr. Patient status doing well without problems. Patient is A&Ox 3. Patient reports no c/o. PROCEDURE SITE CHECK:    Procedure site:without any bleeding and no hematoma, no pain/discomfort reported at procedure site. No change in patient status. Continue to monitor patient and status.

## 2019-01-09 ENCOUNTER — OFFICE VISIT (OUTPATIENT)
Dept: INTERNAL MEDICINE CLINIC | Age: 64
End: 2019-01-09

## 2019-01-09 VITALS
TEMPERATURE: 97.8 F | BODY MASS INDEX: 22.29 KG/M2 | SYSTOLIC BLOOD PRESSURE: 134 MMHG | HEIGHT: 67 IN | DIASTOLIC BLOOD PRESSURE: 79 MMHG | HEART RATE: 62 BPM | OXYGEN SATURATION: 97 % | WEIGHT: 142 LBS | RESPIRATION RATE: 18 BRPM

## 2019-01-09 DIAGNOSIS — I10 ESSENTIAL HYPERTENSION: ICD-10-CM

## 2019-01-09 DIAGNOSIS — E78.2 MIXED HYPERLIPIDEMIA: ICD-10-CM

## 2019-01-09 DIAGNOSIS — E03.9 ACQUIRED HYPOTHYROIDISM: Primary | ICD-10-CM

## 2019-01-09 DIAGNOSIS — I49.5 SSS (SICK SINUS SYNDROME) (HCC): ICD-10-CM

## 2019-01-09 DIAGNOSIS — I25.118 CORONARY ARTERY DISEASE OF NATIVE ARTERY OF NATIVE HEART WITH STABLE ANGINA PECTORIS (HCC): ICD-10-CM

## 2019-01-09 DIAGNOSIS — J44.9 CHRONIC OBSTRUCTIVE PULMONARY DISEASE, UNSPECIFIED COPD TYPE (HCC): ICD-10-CM

## 2019-01-09 RX ORDER — ACETAMINOPHEN 500 MG
TABLET ORAL
COMMUNITY
End: 2021-07-30

## 2019-01-09 NOTE — PROGRESS NOTES
Reviewed record in preparation for visit and have obtained necessary documentation. Identified pt with two pt identifiers(name and ). Chief Complaint   Patient presents with   [facility] Maintenance Due   Topic Date Due    DTaP/Tdap/Td series (1 - Tdap) 1977    Shingrix Vaccine Age 50> (1 of 2) 2006    FOBT Q 1 YEAR AGE 50-75  2006    Influenza Age 5 to Adult  2018       Mr. Jenae Win has a reminder for a \"due or due soon\" health maintenance. I have asked that he discuss health maintenance topic(s) due with His  primary care provider. Coordination of Care Questionnaire:  :     1) Have you been to an emergency room, urgent care clinic since your last visit? no   Hospitalized since your last visit? yes          2) Have you seen or consulted any other health care providers outside of [address] [place] [address] since your last visit? no  (Include any pap smears or colon screenings in this section.)    3) Do you have an Advance Directive on file? no    4) Are you interested in receiving information on Advance Directives? YES    Patient is accompanied by self I have received verbal consent from [place] to discuss any/all medical information while they are present in the room.

## 2019-01-09 NOTE — PROGRESS NOTES
Tam Mi is a 59 y.o. male who presents for evaluation of new pt visit, transfer of care. Used to go to [facility], last saw dr Francisco Green there dec 7, 2017. Doing better now, had PPM implanted dec 26, 2018. Has more energy since then.       ROS:  Constitutional: negative for fevers, chills, anorexia and weight loss  Eyes:   negative for visual disturbance and irritation  ENT:   negative for tinnitus,sore throat,nasal congestion,ear pain,hoarseness  Respiratory:  negative for cough, hemoptysis, dyspnea,wheezing  CV:   negative for chest pain, palpitations, lower extremity edema  GI:   negative for nausea, vomiting, diarrhea, abdominal pain,melena  Genitourinary: negative for frequency, dysuria and hematuria  Musculoskel: negative for myalgias, arthralgias, back pain, muscle weakness, joint pain  Neurological:  negative for headaches, dizziness, focal weakness, numbness  Psychiatric:     Negative for depression or anxiety      Past Medical History:   Diagnosis Date    Acute myocardial infarction, unspecified site, episode of care unspecified 2/22/2013    CAD (coronary artery disease)     Chest pain 10/15/2014    COPD [facility]chronic obstructive pulmonary disease) [facility]     Coronary atherosclerosis of native coronary artery 2/22/2013    Exercise induced bronchospasm 2/22/2013    Hypertension     Hyperthyroidism     Old myocardial infarction 2/22/2013    Tobacco use 5/5/2012       Past Surgical History:   Procedure Laterality Date    CARDIAC SURG PROCEDURE UNLIST      stents may 2012     HX CATARACT REMOVAL Left 02/02/2016    HX OTHER SURGICAL  12/26/2018    Pacemaker       Family History   Problem Relation Age of Onset    Cancer Mother         lung cancer    Heart Disease Father     Thyroid Disease Brother     Thyroid Disease Maternal Aunt        Social History     Socioeconomic History    Marital status:      Spouse name: Not on file    Number of children: Not on file    Years of education: Not on file    Highest education level: Not on file   Social Needs    Financial resource strain: Not on file    Food insecurity - worry: Not on file    Food insecurity - inability: Not on file    Transportation needs - medical: Not on file   Britely needs - non-medical: Not on file   Occupational History    Not on file   Tobacco Use    Smoking status: Current Every Day Smoker     Packs/day: 1.00     Years: 38.00     Pack years: 38.00     Types: Cigarettes     Last attempt to quit: 3/17/2017     Years since quittin.8    Smokeless tobacco: Never Used   Substance and Sexual Activity    Alcohol use: No     Alcohol/week: 0.0 oz    Drug use: Yes     Types: Marijuana     Comment: OCC.  Sexual activity: Not Currently   Other Topics Concern    Not on file   Social History Narrative    Not on file            Visit Vitals  /79 (BP 1 Location: Right arm, BP Patient Position: Sitting)   Pulse 62   Temp 97.8 °F (36.6 °C) (Oral)   Resp 18   Ht 5' 7\" (1.702 m)   Wt 142 lb (64.4 kg)   SpO2 97%   BMI 22.24 kg/m²       Physical Examination:   General - Well appearing male  HEENT - PERRL, TM no erythema/opacification, normal nasal turbinates, no oropharyngeal erythema or exudate, MMM  Neck - supple, no bruits, no thyroidomegaly, no lymphadenopathy  Pulm - clear to auscultation bilaterally  Cardio - RRR, normal S1 S2, no murmur  Abd - soft, nontender, no masses, no HSM  Extrem - no edema, +2 distal pulses  Neuro-  No focal deficits, CN intact     Assessment/Plan:    1. Hypothyroid--check tsh/ft4. Had been on synthroid in past  2.  hyperlipids--on pravachol, last LDL 93  3. Copd--continue with pulmicort, tudorza, prn albuterol  4. SSS--sp ppm  5. Tobacco abuse--has cut back some  6. Cad--on asa  7. Routine adult health maintenance--rx given for shingrix.   Will need colonoscopy, hopefully after next visit (given ppm time to heal)    rtc 6 months        Tiara Marte III, DO

## 2019-01-09 NOTE — PATIENT INSTRUCTIONS
Hypothyroidism: Care Instructions  Your Care Instructions    You have hypothyroidism, which means that your body is not making enough thyroid hormone. This hormone helps your body use energy. If your thyroid level is low, you may feel tired, be constipated, have an increase in your blood pressure, or have dry skin or memory problems. You may also get cold easily, even when it is warm. Women with low thyroid levels may have heavy menstrual periods. A blood test to find your thyroid-stimulating hormone (TSH) level is used to check for hypothyroidism. A high TSH level may mean that you have low thyroid. When your body is not making enough thyroid hormone, TSH levels rise in an effort to make the body produce more. The treatment for hypothyroidism is to take thyroid hormone pills. You should start to feel better in 1 to 2 weeks. But it can take several months to see changes in the TSH level. You will need regular visits with your doctor to make sure you have the right dose of medicine. Most people need treatment for the rest of their lives. You will need to see your doctor regularly to have blood tests and to make sure you are doing well. Follow-up care is a key part of your treatment and safety. Be sure to make and go to all appointments, and call your doctor if you are having problems. It's also a good idea to know your test results and keep a list of the medicines you take. How can you care for yourself at home? · Take your thyroid hormone medicine exactly as prescribed. Call your doctor if you think you are having a problem with your medicine. Most people do not have side effects if they take the right amount of medicine regularly. ? Take the medicine 30 minutes before breakfast, and do not take it with calcium, vitamins, or iron. ? Do not take extra doses of your thyroid medicine. It will not help you get better any faster, and it may cause side effects.   ? If you forget to take a dose, do NOT take a double dose of medicine. Take your usual dose the next day. · Tell your doctor about all prescription, herbal, or over-the-counter products you take. · Take care of yourself. Eat a healthy diet, get enough sleep, and get regular exercise. When should you call for help? Call 911 anytime you think you may need emergency care. For example, call if:    · You passed out (lost consciousness).     · You have severe trouble breathing.     · You have a very slow heartbeat (less than 60 beats a minute).     · You have a low body temperature (95°F or below).    Call your doctor now or seek immediate medical care if:    · You feel tired, sluggish, or weak.     · You have trouble remembering things or concentrating.     · You do not begin to feel better 2 weeks after starting your medicine.    Watch closely for changes in your health, and be sure to contact your doctor if you have any problems. Where can you learn more? Go to http://benjamin-irma.info/. Enter U349 in the search box to learn more about \"Hypothyroidism: Care Instructions. \"  Current as of: March 15, 2018  Content Version: 11.8  © 0641-1183 [employer]. Care instructions adapted under license by [employer] (which disclaims liability or warranty for this information). If you have questions about a medical condition or this instruction, always ask your healthcare professional. [address] any warranty or liability for your use of this information.

## 2019-01-10 ENCOUNTER — CLINICAL SUPPORT (OUTPATIENT)
Dept: CARDIOLOGY CLINIC | Age: 64
End: 2019-01-10

## 2019-01-10 ENCOUNTER — TELEPHONE (OUTPATIENT)
Dept: CARDIOLOGY CLINIC | Age: 64
End: 2019-01-10

## 2019-01-10 DIAGNOSIS — E03.9 ACQUIRED HYPOTHYROIDISM: ICD-10-CM

## 2019-01-10 DIAGNOSIS — Z95.0 PACEMAKER: ICD-10-CM

## 2019-01-10 DIAGNOSIS — I49.5 SSS (SICK SINUS SYNDROME) (HCC): Primary | ICD-10-CM

## 2019-01-10 DIAGNOSIS — I49.5 SSS (SICK SINUS SYNDROME) (HCC): ICD-10-CM

## 2019-01-10 DIAGNOSIS — Z95.0 CARDIAC PACEMAKER IN SITU: Primary | ICD-10-CM

## 2019-01-10 LAB
T4 FREE SERPL-MCNC: 0.34 NG/DL (ref 0.82–1.77)
TSH SERPL DL<=0.005 MIU/L-ACNC: 62.23 UIU/ML (ref 0.45–4.5)

## 2019-01-10 RX ORDER — LEVOTHYROXINE SODIUM 100 UG/1
100 TABLET ORAL
Qty: 90 TAB | Refills: 3 | Status: SHIPPED | OUTPATIENT
Start: 2019-01-10 | End: 2020-01-12

## 2019-01-10 NOTE — TELEPHONE ENCOUNTER
Calling about needing order for automatic bp monitor for patient. Please call Dixie wilde[facility].  Thanks

## 2019-01-10 NOTE — PROGRESS NOTES
Thyroid levels definitely off, need to resume synthroid. rx sent in.  rtc 3 months for follow up, and recheck tsh then.

## 2019-01-10 NOTE — PROGRESS NOTES
[place]  1955  May Wilkinson DO          Subjective:    Patient is here for 2 week site check and device interrogation after pacemaker implantation. The patient denies chest pain/shortness of breath or fevers. Mr Jenae Win reports feeling better now than he has in years. He is pleased with his progress. Patient Active Problem List    Diagnosis Date Noted    SSS (sick sinus syndrome) ([facility] [address] 12/26/2018    S/P cardiac cath 12/06/2018    Acquired hypothyroidism 06/15/2016    ASHD (arteriosclerotic heart disease) 06/12/2015    Essential hypertension 04/28/2015    Insomnia 12/29/2014    COPD (chronic obstructive pulmonary disease) ([facility]     Coronary atherosclerosis of native coronary artery 02/22/2013    Exercise induced bronchospasm 02/22/2013    Old myocardial infarction 02/22/2013    S/P coronary artery stent placement 05/08/2012    Dyslipidemia 05/08/2012    STEMI (ST elevation myocardial infarction) ([facility] [address] 05/05/2012    Tobacco use 05/05/2012      Past Medical History:   Diagnosis Date    Acute myocardial infarction, unspecified site, episode of care unspecified 2/22/2013    CAD (coronary artery disease)     Chest pain 10/15/2014    COPD (chronic obstructive pulmonary disease) ([facility] [address]     Coronary atherosclerosis of native coronary artery 2/22/2013    Exercise induced bronchospasm 2/22/2013    Hypertension     Hyperthyroidism     Old myocardial infarction 2/22/2013    Tobacco use 5/5/2012      Past Surgical History:   Procedure Laterality Date    CARDIAC SURG PROCEDURE UNLIST      stents may 2012     HX CATARACT REMOVAL Left 02/02/2016    HX OTHER SURGICAL  12/26/2018    Pacemaker     Allergies   Allergen Reactions    Bupropion Other (comments)     Urinary frequency and flatulence.     Chantix [Varenicline] Other (comments)     Trouble sleeping, soreness of mouth    Codeine Itching    Fluticasone Shortness of Breath and Swelling     possibly due to   [facility] Melatonin Myalgia    Nasonex [Mometasone] Other (comments)     swelling of throat and hands possibly due to      Family History   Problem Relation Age of Onset    Cancer Mother         lung cancer    Heart Disease Father     Thyroid Disease Brother     Thyroid Disease Maternal Aunt       Current Outpatient Medications   Medication Sig    acetaminophen (TYLENOL EXTRA STRENGTH) 500 mg tablet Take  by mouth every six (6) hours as needed for Pain.  levothyroxine (SYNTHROID) 100 mcg tablet TAKE ONE TABLET BY MOUTH DAILY BEFORE BREAKFAST    carvedilol (COREG) 6.25 mg tablet Take 1 Tab by mouth two (2) times a day.  hydroCHLOROthiazide (HYDRODIURIL) 12.5 mg tablet Take 2 Tabs by mouth daily.  albuterol (PROAIR HFA) 90 mcg/actuation inhaler Take 2 Puffs by inhalation every six (6) hours as needed for Wheezing.  TUDORZA PRESSAIR 400 mcg/actuation inhaler INHALE ONE DOSE BY MOUTH TWICE A DAY    losartan (COZAAR) 100 mg tablet Take 1 Tab by mouth daily.  pravastatin (PRAVACHOL) 40 mg tablet TAKE ONE TABLET BY MOUTH DAILY    budesonide (PULMICORT FLEXHALER) 180 mcg/actuation aepb inhaler INHALE TWO PUFFS BY MOUTH TWICE A DAY    nitroglycerin (NITROSTAT) 0.4 mg SL tablet 1 Tab by SubLINGual route every five (5) minutes as needed for Chest Pain.  inhalational spacing device 1 Each by Does Not Apply route as needed.  aspirin delayed-release 81 mg tablet Take 1 Tab by mouth daily. No current facility-administered medications for this visit. Review of Systems:    General: Pt denies excessive weight gain or loss. Pt is able to conduct ADL's  Respiratory: Denies shortness of breath, ANGEL, wheezing or stridor. Cardiovascular: Denies precordial pain, palpitations, edema or PND        Physical ExamPhysical Exam:      General: Well developed, in no acute distress. .  Chest: left subclavian pacer site approximated well  Neuro: A&Ox3, speech clear, gait stable.           Assessment:       ICD-10-CM ICD-9-CM    1. SSS (sick sinus syndrome) ([facility]) I49.5 427.81    2. Pacemaker Z95.0 V45.01         Plan:     Patient feels well following pacemaker implantation. Left subclavian pacemaker site approximated well, no discharge. No erythema or heat. Follow up as planned in 3 mo. Declined remote monitoring.      Neema Kaba, ANP

## 2019-01-11 NOTE — TELEPHONE ENCOUNTER
DANK Méndez, LPN   Caller: Unspecified Josie Aguilera,  9:12 AM)             PCP      Ramona Berg, advising PCP should be contacted for this.

## 2019-01-14 ENCOUNTER — DOCUMENTATION ONLY (OUTPATIENT)
Dept: CARDIOLOGY CLINIC | Age: 64
End: 2019-01-14

## 2019-01-15 NOTE — PROGRESS NOTES
Called, spoke to pt. Two identifiers confirmed. Pt notified of results/recommendations per Dr. Rickie Bain. Pt scheduled for follow up on [email or web address]  Pt verbalized understanding of information discussed w/ no further questions at this time.

## 2019-01-18 NOTE — TELEPHONE ENCOUNTER
PCP: Taco Stokes DO    Last appt: 1/9/2019  Future Appointments   Date Time Provider Anderson Paizi   3/28/2019 10:00 AM PACEMAKER, Santos Oliva SLOAN SCHED   3/28/2019 10:15 AM Dao Larson MD [facility] S[facility] SCHED   4/10/2019  9:45 AM Taco Stokes DO [facility] SLOAN SCHED   7/10/2019  9:45 AM Jose Matson DO [facility] SLOAN SCHED       Requested Prescriptions     Pending Prescriptions Disp Refills    budesonide (PULMICORT FLEXHALER) 180 mcg/actuation aepb inhaler 3 Inhaler 3     Sig: INHALE TWO PUFFS BY MOUTH TWICE A DAY

## 2019-03-28 ENCOUNTER — OFFICE VISIT (OUTPATIENT)
Dept: CARDIOLOGY CLINIC | Age: 64
End: 2019-03-28

## 2019-03-28 ENCOUNTER — CLINICAL SUPPORT (OUTPATIENT)
Dept: CARDIOLOGY CLINIC | Age: 64
End: 2019-03-28

## 2019-03-28 VITALS
SYSTOLIC BLOOD PRESSURE: 114 MMHG | WEIGHT: 143 LBS | HEIGHT: 67 IN | DIASTOLIC BLOOD PRESSURE: 68 MMHG | RESPIRATION RATE: 18 BRPM | OXYGEN SATURATION: 96 % | BODY MASS INDEX: 22.44 KG/M2 | HEART RATE: 68 BPM

## 2019-03-28 DIAGNOSIS — Z95.0 CARDIAC PACEMAKER IN SITU: ICD-10-CM

## 2019-03-28 DIAGNOSIS — I10 ESSENTIAL HYPERTENSION: ICD-10-CM

## 2019-03-28 DIAGNOSIS — I25.10 ASHD (ARTERIOSCLEROTIC HEART DISEASE): Primary | ICD-10-CM

## 2019-03-28 DIAGNOSIS — Z95.0 CARDIAC PACEMAKER IN SITU: Primary | ICD-10-CM

## 2019-03-28 DIAGNOSIS — I49.5 SSS (SICK SINUS SYNDROME) (HCC): ICD-10-CM

## 2019-03-28 DIAGNOSIS — E78.5 DYSLIPIDEMIA: ICD-10-CM

## 2019-03-28 DIAGNOSIS — J44.9 CHRONIC OBSTRUCTIVE PULMONARY DISEASE, UNSPECIFIED COPD TYPE (HCC): ICD-10-CM

## 2019-03-28 DIAGNOSIS — I25.118 CORONARY ARTERY DISEASE OF NATIVE ARTERY OF NATIVE HEART WITH STABLE ANGINA PECTORIS (HCC): ICD-10-CM

## 2019-03-28 NOTE — PROGRESS NOTES
1. Have you been to the ER, urgent care clinic since your last visit? Hospitalized since your last visit? No    2. Have you seen or consulted any other health care providers outside of the [address] [place] [address] since your last visit? Include any pap smears or colon screening. No    Chief Complaint   Patient presents with    Pacemaker Check     3 mo appt. Denied cardiac symptoms.

## 2019-03-28 NOTE — PROGRESS NOTES
Subjective:      Pablo Díaz is a 59 y.o. male is here for EP consult. The patient denies chest pain/ shortness of breath, orthopnea, PND, LE edema, palpitations, syncope, presyncope or fatigue. Patient Active Problem List    Diagnosis Date Noted    [facility] 12/26/2018    S/P cardiac cath 12/06/2018    Acquired hypothyroidism 06/15/2016    ASHD (arteriosclerotic heart disease) 06/12/2015    Essential hypertension 04/28/2015    Insomnia 12/29/2014    [facility] obstructive [facility]     Coronary atherosclerosis of native coronary artery 02/22/2013    Exercise induced bronchospasm 02/22/2013    Old myocardial infarction 02/22/2013    S/P coronary artery stent placement 05/08/2012    Dyslipidemia 05/08/2012    STEMI (ST elevation myocardial infarction) [facility] 05/05/2012    Tobacco use 05/05/2012      Prudy Purchase, DO  Past Medical History:   Diagnosis Date    Acute myocardial infarction, unspecified site, episode of care unspecified 2/22/2013    CAD (coronary artery disease)     Chest pain 10/15/2014    [facility] obstructive [facility]     Coronary atherosclerosis of native coronary artery 2/22/2013    Exercise induced bronchospasm 2/22/2013    Hypertension     Hyperthyroidism     Old myocardial infarction 2/22/2013    Tobacco use 5/5/2012      Past Surgical History:   Procedure Laterality Date    CARDIAC SURG PROCEDURE UNLIST      stents may 2012     HX CATARACT REMOVAL Left 02/02/2016    HX OTHER SURGICAL  12/26/2018    Pacemaker     Allergies   Allergen Reactions    Bupropion Other (comments)     Urinary frequency and flatulence.     Chantix [Varenicline] Other (comments)     Trouble sleeping, soreness of mouth    Codeine Itching    Fluticasone Shortness of Breath and Swelling     possibly due to    Melatonin Myalgia    Nasonex [Mometasone] Other (comments)     swelling of throat and hands possibly due to      Family History   Problem Relation Age of Onset    Cancer Mother         lung cancer    Heart Disease Father     Thyroid Disease Brother     Thyroid Disease Maternal Aunt     negative for cardiac disease  Social History     Socioeconomic History    Marital status:      Spouse name: Not on file    Number of children: Not on file    Years of education: Not on file    Highest education level: Not on file   Tobacco Use    Smoking status: Current Every Day Smoker     Packs/day: 1.00     Years: 38.00     Pack years: 38.00     Types: Cigarettes     Last attempt to quit: 3/17/2017     Years since quittin.0    Smokeless tobacco: Never Used   Substance and Sexual Activity    Alcohol use: No     Alcohol/week: 0.0 oz    Drug use: Yes     Types: Marijuana     Comment: OCC.  Sexual activity: Not Currently     Current Outpatient Medications   Medication Sig    budesonide (PULMICORT FLEXHALER) 180 mcg/actuation aepb inhaler INHALE TWO PUFFS BY MOUTH TWICE A DAY    levothyroxine (SYNTHROID) 100 mcg tablet Take 1 Tab by mouth Daily (before breakfast).  acetaminophen (TYLENOL EXTRA STRENGTH) 500 mg tablet Take  by mouth every six (6) hours as needed for Pain.  carvedilol (COREG) 6.25 mg tablet Take 1 Tab by mouth two (2) times a day.  hydroCHLOROthiazide (HYDRODIURIL) 12.5 mg tablet Take 2 Tabs by mouth daily.  albuterol (PROAIR HFA) 90 mcg/actuation inhaler Take 2 Puffs by inhalation every six (6) hours as needed for Wheezing.  TUDORZA PRESSAIR 400 mcg/actuation inhaler INHALE ONE DOSE BY MOUTH TWICE A DAY    losartan (COZAAR) 100 mg tablet Take 1 Tab by mouth daily.  pravastatin (PRAVACHOL) 40 mg tablet TAKE ONE TABLET BY MOUTH DAILY    nitroglycerin (NITROSTAT) 0.4 mg SL tablet 1 Tab by SubLINGual route every five (5) minutes as needed for Chest Pain.  inhalational spacing device 1 Each by Does Not Apply route as needed.     aspirin delayed-release 81 mg tablet Take 1 Tab by mouth daily. No current facility-administered medications for this visit. Vitals:    03/28/19 0944   BP: 114/68   Pulse: 68   Resp: 18   SpO2: 96%   Weight: 143 lb (64.9 kg)   Height: 5' 7\" (1.702 m)       I have reviewed the nurses notes, vitals, problem list, allergy list, medical history, family, social history and medications. Review of Symptoms:    General: Pt denies excessive weight gain or loss. Pt is able to conduct ADL's  HEENT: Denies blurred vision, headaches, epistaxis and difficulty swallowing. Respiratory: Denies shortness of breath, ANGEL, wheezing or stridor. Cardiovascular: Denies precordial pain, palpitations, edema or PND  Gastrointestinal: Denies poor appetite, indigestion, abdominal pain or blood in stool  Urinary: Denies dysuria, pyuria  Musculoskeletal: Denies pain or swelling from muscles or joints  Neurologic: Denies tremor, paresthesias, or sensory motor disturbance  Skin: Denies rash, itching or texture change. Psych: Denies depression      Physical Exam:      General: Well developed, in no acute distress. HEENT: Eyes - PERRL, no jvd  Heart:  Normal S1/S2 negative S3 or S4. Regular, no murmur, gallop or rub.   Respiratory: Clear bilaterally x 4, no wheezing or rales  Extremities:  No edema, normal cap refill, no cyanosis. Musculoskeletal: No clubbing  Neuro: A&Ox3, speech clear, gait stable. Skin: Skin color is normal. No rashes or lesions.  Non diaphoretic  Vascular: 2+ pulses symmetric in all extremities    Cardiographics    Ekg: Sinus  Rhythm  -Short NE syndrome   Hunter = 104  BORDERLINE RHYTHM    Results for orders placed or performed in visit on 11/26/18   CARDIAC HOLTER MONITOR, 24 HOURS    Narrative    ECG Monitor/24 hours, Complete    Reason for Holter Monitor  SYNCOPE    Heartbeat    Slowest 43  Average 60  Fastest  94        Results:   Underlying Rhythm: Normal sinus rhythm      Atrial Arrhythmias: severe bradycardia, PSVT         AV Conduction: normal    Ventricular Arrhythmias: premature ventricular contractions; occasional     ST Segment Analysis:normal     Symptom Correlation:  None reported    Comment:   Sinus rhythm with PSVT and episodes of profound bradycardia to 40 bpm. Clinical correlation advised. Danny Bain MD, Harjit Lee      Results for orders placed or performed during the hospital encounter of 03/19/17   EKG, 12 LEAD, INITIAL   Result Value Ref Range    Ventricular Rate 82 BPM    Atrial Rate 82 BPM    P-R Interval 136 ms    QRS Duration 94 ms    Q-T Interval 384 ms    QTC Calculation (Bezet) 448 ms    Calculated P Axis 61 degrees    Calculated R Axis 76 degrees    Calculated T Axis 63 degrees    Diagnosis       Normal sinus rhythm  Normal ECG  Confirmed by Kartik Dobson ([id number]) on 3/20/2017 8:10:32 AM           Lab Results   Component Value Date/Time    WBC 9.7 12/18/2018 09:39 AM    HGB 15.6 12/18/2018 09:39 AM    HCT 46.7 12/18/2018 09:39 AM    PLATELET 407 31/53/2783 09:39 AM    MCV 96 12/18/2018 09:39 AM      Lab Results   Component Value Date/Time    Sodium 137 12/18/2018 09:39 AM    Potassium 4.7 12/18/2018 09:39 AM    Chloride 93 (L) 12/18/2018 09:39 AM    CO2 28 12/18/2018 09:39 AM    Anion gap 7 03/19/2017 09:02 PM    Glucose 105 (H) 12/18/2018 09:39 AM    BUN 16 12/18/2018 09:39 AM    Creatinine 0.98 12/18/2018 09:39 AM    BUN/Creatinine ratio 16 12/18/2018 09:39 AM    GFR est AA 95 12/18/2018 09:39 AM    GFR est non-AA 82 12/18/2018 09:39 AM    Calcium 10.1 12/18/2018 09:39 AM    Bilirubin, total 0.5 12/18/2018 09:39 AM    AST (SGOT) 24 12/18/2018 09:39 AM    Alk.  phosphatase 101 12/18/2018 09:39 AM    Protein, total 7.6 12/18/2018 09:39 AM    Albumin 4.7 12/18/2018 09:39 AM    Globulin 3.8 03/19/2017 09:02 PM    A-G Ratio 1.6 12/18/2018 09:39 AM    ALT (SGPT) 22 12/18/2018 09:39 AM      Lab Results   Component Value Date/Time    TSH 62.230 (H) 01/09/2019 12:02 PM        Assessment:            ICD-10-CM ICD-9-CM 1. ASHD (arteriosclerotic heart disease) I25.10 414.00 AMB POC EKG ROUTINE W/ 12 LEADS, INTER & REP   2. SSS (sick sinus syndrome) ([facility]) I49.5 427.81    3. Cardiac pacemaker in situ Z95.0 V45.01    4. Dyslipidemia E78.5 272.4    5. Coronary artery disease of native artery of native heart with stable angina pectoris ([facility] I25.118 414.01      413.9    6. Chronic obstructive pulmonary disease, unspecified COPD type ([facility] J44.9 496    7. Essential hypertension I10 401.9      Orders Placed This Encounter    AMB POC EKG ROUTINE W/ 12 LEADS, INTER & REP     Order Specific Question:   Reason for Exam:     Answer:   routine        Plan:     Mr Yelena Sanchez is here for follow up and device check (s/p dual chamber PM). He is 23% AP and <0.1% RVP. Three asymptomatic episodes of  AT, longest 5 sec. Enrolled in remote monitoring of his device. We will see him back in 1 year. Continue medical management for COPD, ASHD. Thank you for allowing me to participate in Carsonhank Green 's care. Meredith Pierson NP    Patient seen and examined. All pertinent data reviewed. I have reviewed detailed note as outlined by Meredith Pierson NP. Case discussed with Nursing/medical assistant staff and Meredith Pierson NP. Plans as outlined. A paced 23% for sick sinus. Cont med rx for htn, ashd and copd.  F/u in one year    Reggie Green MD, Stephenie Little

## 2019-04-10 ENCOUNTER — OFFICE VISIT (OUTPATIENT)
Dept: INTERNAL MEDICINE CLINIC | Age: 64
End: 2019-04-10

## 2019-04-10 VITALS
HEART RATE: 71 BPM | OXYGEN SATURATION: 97 % | WEIGHT: 144 LBS | HEIGHT: 66 IN | RESPIRATION RATE: 18 BRPM | TEMPERATURE: 97.7 F | SYSTOLIC BLOOD PRESSURE: 105 MMHG | BODY MASS INDEX: 23.14 KG/M2 | DIASTOLIC BLOOD PRESSURE: 70 MMHG

## 2019-04-10 DIAGNOSIS — J44.9 CHRONIC OBSTRUCTIVE PULMONARY DISEASE, UNSPECIFIED COPD TYPE (HCC): ICD-10-CM

## 2019-04-10 DIAGNOSIS — I49.5 SSS (SICK SINUS SYNDROME) (HCC): ICD-10-CM

## 2019-04-10 DIAGNOSIS — I25.118 CORONARY ARTERY DISEASE OF NATIVE ARTERY OF NATIVE HEART WITH STABLE ANGINA PECTORIS (HCC): ICD-10-CM

## 2019-04-10 DIAGNOSIS — E03.9 ACQUIRED HYPOTHYROIDISM: Primary | ICD-10-CM

## 2019-04-10 DIAGNOSIS — E78.2 MIXED HYPERLIPIDEMIA: ICD-10-CM

## 2019-04-10 DIAGNOSIS — I10 ESSENTIAL HYPERTENSION: ICD-10-CM

## 2019-04-10 NOTE — PROGRESS NOTES
Emi Siemens is a 59 y.o. male who presents for evaluation of hypothyroid. Last seen by me jan 9, 2019 in new pt visit. TSH was elevated at 62.23 and FT4 was low at 0.34. Started synthroid. Feels much better. Also less stress in his life now, as he moved out from the apt he had been in, when he was living with his brother and his GF. He has his own place now.       ROS:  Constitutional: negative for fevers, chills, anorexia and weight loss  Eyes:   negative for visual disturbance and irritation  ENT:   negative for tinnitus,sore throat,nasal congestion,ear pain,hoarseness  Respiratory:  negative for cough, hemoptysis, dyspnea,wheezing  CV:   negative for chest pain, palpitations, lower extremity edema  GI:   negative for nausea, vomiting, diarrhea, abdominal pain,melena  Genitourinary: negative for frequency, dysuria and hematuria  Musculoskel: negative for myalgias, arthralgias, back pain, muscle weakness, joint pain  Neurological:  negative for headaches, dizziness, focal weakness, numbness  Psychiatric:     Negative for depression or anxiety      Past Medical History:   Diagnosis Date    Acute myocardial infarction, unspecified site, episode of care unspecified 2/22/2013    CAD (coronary artery disease)     Chest pain 10/15/2014    COPD [facility]chronic obstructive pulmonary disease) ([facility] [address]     Coronary atherosclerosis of native coronary artery 2/22/2013    Exercise induced bronchospasm 2/22/2013    Hypertension     Hyperthyroidism     Old myocardial infarction 2/22/2013    Tobacco use 5/5/2012       Past Surgical History:   Procedure Laterality Date    CARDIAC SURG PROCEDURE UNLIST      stents may 2012     HX CATARACT REMOVAL Left 02/02/2016    HX OTHER SURGICAL  12/26/2018    Pacemaker       Family History   Problem Relation Age of Onset    Cancer Mother         lung cancer    Heart Disease Father     Thyroid Disease Brother     Thyroid Disease Maternal Aunt        Social History Socioeconomic History    Marital status:      Spouse name: Not on file    Number of children: Not on file    Years of education: Not on file    Highest education level: Not on file   Occupational History    Not on file   Social Needs    Financial resource strain: Not on file    Food insecurity:     Worry: Not on file     Inability: Not on file    Transportation needs:     Medical: Not on file     Non-medical: Not on file   Tobacco Use    Smoking status: Current Every Day Smoker     Packs/day: 1.00     Years: 38.00     Pack years: 38.00     Types: Cigarettes     Last attempt to quit: 3/17/2017     Years since quittin.0    Smokeless tobacco: Never Used   Substance and Sexual Activity    Alcohol use: No     Alcohol/week: 0.0 oz    Drug use: Yes     Types: Marijuana     Comment: OCC.     Sexual activity: Not Currently   Lifestyle    Physical activity:     Days per week: Not on file     Minutes per session: Not on file    Stress: Not on file   Relationships    Social connections:     Talks on phone: Not on file     Gets together: Not on file     Attends Protestant service: Not on file     Active member of club or organization: Not on file     Attends meetings of clubs or organizations: Not on file     Relationship status: Not on file    Intimate partner violence:     Fear of current or ex partner: Not on file     Emotionally abused: Not on file     Physically abused: Not on file     Forced sexual activity: Not on file   Other Topics Concern    Not on file   Social History Narrative    Not on file            Visit Vitals  /70 (BP 1 Location: Left arm, BP Patient Position: Sitting)   Pulse 71   Temp 97.7 °F (36.5 °C) (Oral)   Resp 18   Ht 5' 6\" (1.676 m)   Wt 144 lb (65.3 kg)   SpO2 97%   BMI 23.24 kg/m²       Physical Examination:   General - Well appearing male  HEENT - PERRL, TM no erythema/opacification, normal nasal turbinates, no oropharyngeal erythema or exudate, MMM  Neck - supple, no bruits, no thyroidomegaly, no lymphadenopathy  Pulm - clear to auscultation bilaterally  Cardio - RRR, normal S1 S2, no murmur  Abd - soft, nontender, no masses, no HSM  Extrem - no edema, +2 distal pulses  Neuro-  No focal deficits, CN intact     Assessment/Plan:    1. Hypothyroid--check tsh, ft4. Might need to adjust synthroid again  2.  hyperlipids--on pravachol  3. Copd--continue pulmicort and tudorza. Rarely needs proair  4. Sss--doing well with ppm  5. Cad--continue asa, coreg, cozaar  6.  htn--controlled with coreg, hctz, cozaar  7. Tobacco abuse--working on cutting back    rtc 3 months. If tsh is normal now, will have him extend out to 6 months instead    Will refer him to gi for screening colon at next visit. Still only 4 months out from [facility] implantation.         Lorenzo Hernández III, DO

## 2019-04-10 NOTE — PATIENT INSTRUCTIONS
Learning About Benefits From Quitting Smoking  How does quitting smoking make you healthier? If you're thinking about quitting smoking, you may have a few reasons to be smoke-free. Your health may be one of them. · When you quit smoking, you lower your risks for cancer, lung disease, heart attack, stroke, blood vessel disease, and blindness from macular degeneration. · When you're smoke-free, you get sick less often, and you heal faster. You are less likely to get colds, flu, bronchitis, and pneumonia. · As a nonsmoker, you may find that your mood is better and you are less stressed. When and how will you feel healthier? Quitting has real health benefits that start from day 1 of being smoke-free. And the longer you stay smoke-free, the healthier you get and the better you feel. The first hours  · After just 20 minutes, your blood pressure and heart rate go down. That means there's less stress on your heart and blood vessels. · Within 12 hours, the level of carbon monoxide in your blood drops back to normal. That makes room for more oxygen. With more oxygen in your body, you may notice that you have more energy than when you smoked. After 2 weeks  · Your lungs start to work better. · Your risk of heart attack starts to drop. After 1 month  · When your lungs are clear, you cough less and breathe deeper, so it's easier to be active. · Your sense of taste and smell return. That means you can enjoy food more than you have since you started smoking. Over the years  · After 1 year, your risk of heart disease is half what it would be if you kept smoking. · After 5 years, your risk of stroke starts to shrink. Within a few years after that, it's about the same as if you'd never smoked. · After 10 years, your risk of dying from lung cancer is cut by about half. And your risk for many other types of cancer is lower too. How would quitting help others in your life?   When you quit smoking, you improve the health of everyone who now breathes in your smoke. · Their heart, lung, and cancer risks drop, much like yours. · They are sick less. For babies and small children, living smoke-free means they're less likely to have ear infections, pneumonia, and bronchitis. · If you're a woman who is or will be pregnant someday, quitting smoking means a healthier . · Children who are close to you are less likely to become adult smokers. Where can you learn more? Go to http://benjamin-irma.info/. Enter 052 [id number] 72 11 in the search box to learn more about \"Learning About Benefits From Quitting Smoking. \"  Current as of: 2018  Content Version: 11.9  © 9277-9688 [employer]. Care instructions adapted under license by [employer] (which disclaims liability or warranty for this information). If you have questions about a medical condition or this instruction, always ask your healthcare professional. [place] [address] any warranty or liability for your use of this information. Hypothyroidism: Care Instructions  Your Care Instructions    You have hypothyroidism, which means that your body is not making enough thyroid hormone. This hormone helps your body use energy. If your thyroid level is low, you may feel tired, be constipated, have an increase in your blood pressure, or have dry skin or memory problems. You may also get cold easily, even when it is warm. Women with low thyroid levels may have heavy menstrual periods. A blood test to find your thyroid-stimulating hormone (TSH) level is used to check for hypothyroidism. A high TSH level may mean that you have low thyroid. When your body is not making enough thyroid hormone, TSH levels rise in an effort to make the body produce more. The treatment for hypothyroidism is to take thyroid hormone pills. You should start to feel better in 1 to 2 weeks.  But it can take several months to see changes in the TSH level. You will need regular visits with your doctor to make sure you have the right dose of medicine. Most people need treatment for the rest of their lives. You will need to see your doctor regularly to have blood tests and to make sure you are doing well. Follow-up care is a key part of your treatment and safety. Be sure to make and go to all appointments, and call your doctor if you are having problems. It's also a good idea to know your test results and keep a list of the medicines you take. How can you care for yourself at home? · Take your thyroid hormone medicine exactly as prescribed. Call your doctor if you think you are having a problem with your medicine. Most people do not have side effects if they take the right amount of medicine regularly. ? Take the medicine 30 minutes before breakfast, and do not take it with calcium, vitamins, or iron. ? Do not take extra doses of your thyroid medicine. It will not help you get better any faster, and it may cause side effects. ? If you forget to take a dose, do NOT take a double dose of medicine. Take your usual dose the next day. · Tell your doctor about all prescription, herbal, or over-the-counter products you take. · Take care of yourself. Eat a healthy diet, get enough sleep, and get regular exercise. When should you call for help? Call 911 anytime you think you may need emergency care.  For example, call if:    · You passed out (lost consciousness).     · You have severe trouble breathing.     · You have a very slow heartbeat (less than 60 beats a minute).     · You have a low body temperature (95°F or below).    Call your doctor now or seek immediate medical care if:    · You feel tired, sluggish, or weak.     · You have trouble remembering things or concentrating.     · You do not begin to feel better 2 weeks after starting your medicine.    Watch closely for changes in your health, and be sure to contact your doctor if you have any problems. Where can you learn more? Go to http://benjamin-irma.info/. Enter T921 in the search box to learn more about \"Hypothyroidism: Care Instructions. \"  Current as of: March 14, 2018  Content Version: 11.9  © 8341-6311 [employer], [employer]. Care instructions adapted under license by [employer] (which disclaims liability or warranty for this information). If you have questions about a medical condition or this instruction, always ask your healthcare professional. [address] any warranty or liability for your use of this information.

## 2019-04-10 NOTE — PROGRESS NOTES
Reviewed record in preparation for visit and have obtained necessary documentation. Identified pt with two pt identifiers(name and ). Chief Complaint   Patient presents with    Follow-up     3 month       Health Maintenance Due   Topic Date Due    DTaP/Tdap/Td series (1 - Tdap) 1977    Shingrix Vaccine Age 50> (1 of 2) 2006    FOBT Q 1 YEAR AGE 50-75  2006       Mr. Charles Feldman has a reminder for a \"due or due soon\" health maintenance. I have asked that he discuss health maintenance topic(s) due with His  primary care provider. Coordination of Care Questionnaire:  :     1) Have you been to an emergency room, urgent care clinic since your last visit? no   Hospitalized since your last visit? no             2) Have you seen or consulted any other health care providers outside of [address] [place] [address] since your last visit? no  (Include any pap smears or colon screenings in this section.)    3) Do you have an Advance Directive on file? no    4) Are you interested in receiving information on Advance Directives? NO    Patient is accompanied by self I have received verbal consent from Lakia Yusuf to discuss any/all medical information while they are present in the room.

## 2019-04-11 LAB
T4 FREE SERPL-MCNC: 1.89 NG/DL (ref 0.82–1.77)
TSH SERPL DL<=0.005 MIU/L-ACNC: 0.85 UIU/ML (ref 0.45–4.5)

## 2019-04-11 NOTE — PROGRESS NOTES
Lab results reviewed with patient. Patient verbalized understanding and does not have any questions at this time. 07/10 appointment rescheduled to 10/10 in which patient is aware.

## 2019-04-11 NOTE — PROGRESS NOTES
Thyroid level much improved. Continue same dose of synthroid. Will check levels again in 6 months, so make sure he has an appt for 6 months.

## 2019-05-21 RX ORDER — HYDROCHLOROTHIAZIDE 12.5 MG/1
TABLET ORAL
Qty: 60 TAB | Refills: 11 | Status: SHIPPED | OUTPATIENT
Start: 2019-05-21 | End: 2020-05-11

## 2019-06-18 ENCOUNTER — TELEPHONE (OUTPATIENT)
Dept: INTERNAL MEDICINE CLINIC | Age: 64
End: 2019-06-18

## 2019-06-18 NOTE — TELEPHONE ENCOUNTER
Called, spoke to pt. Two identifiers confirmed. Pt stated that his Pulmicort is no longer helping. Pt thinks that it is actually making his symptoms worse. Pt is having a hard time catching his breath. Pt requesting something else he can take to help with s/s. Notified pt I would send Dr. Franklin Sidhu a message. Pt verbalized understanding of information discussed w/ no further questions at this time. Of note, several appointment offered to pt for this week. Pt declined stating he only has rides on Fridays. No Fridays available currently.

## 2019-06-18 NOTE — TELEPHONE ENCOUNTER
Patient states he needs a call back to get an acute appt asap in reference to his Breathing medication is no longer working & patient reports it \"seems to be working against him\". Please call. No appts available.  Thank you

## 2019-06-18 NOTE — TELEPHONE ENCOUNTER
Micki Beth, DO  You Just now (12:53 PM)     Needs to be seen. marcello Soliman, spoke to pt. Two identifiers confirmed. Appointment scheduled for 6/21 @ 1015. Pt verbalized understanding of information discussed w/ no further questions at this time.

## 2019-06-20 RX ORDER — PRAVASTATIN SODIUM 40 MG/1
TABLET ORAL
Qty: 30 TAB | Refills: 0 | Status: SHIPPED | OUTPATIENT
Start: 2019-06-20 | End: 2019-07-17 | Stop reason: SDUPTHER

## 2019-06-21 ENCOUNTER — OFFICE VISIT (OUTPATIENT)
Dept: INTERNAL MEDICINE CLINIC | Age: 64
End: 2019-06-21

## 2019-06-21 VITALS
HEIGHT: 66 IN | OXYGEN SATURATION: 91 % | DIASTOLIC BLOOD PRESSURE: 73 MMHG | SYSTOLIC BLOOD PRESSURE: 108 MMHG | TEMPERATURE: 98 F | WEIGHT: 143 LBS | BODY MASS INDEX: 22.98 KG/M2 | HEART RATE: 84 BPM | RESPIRATION RATE: 16 BRPM

## 2019-06-21 DIAGNOSIS — E03.9 ACQUIRED HYPOTHYROIDISM: ICD-10-CM

## 2019-06-21 DIAGNOSIS — E78.2 MIXED HYPERLIPIDEMIA: ICD-10-CM

## 2019-06-21 DIAGNOSIS — T50.905A ADVERSE EFFECT OF DRUG, INITIAL ENCOUNTER: ICD-10-CM

## 2019-06-21 DIAGNOSIS — I49.5 SSS (SICK SINUS SYNDROME) (HCC): ICD-10-CM

## 2019-06-21 DIAGNOSIS — I10 ESSENTIAL HYPERTENSION: ICD-10-CM

## 2019-06-21 DIAGNOSIS — J44.9 CHRONIC OBSTRUCTIVE PULMONARY DISEASE, UNSPECIFIED COPD TYPE (HCC): Primary | ICD-10-CM

## 2019-06-21 NOTE — PROGRESS NOTES
Identified pt with two pt identifiers(name and ). Reviewed record in preparation for visit and have obtained necessary documentation. Chief Complaint   Patient presents with    Medication Evaluation     pt states pulmicort causing sob, increased HR    Rash     rash on forearms         Health Maintenance Due   Topic    DTaP/Tdap/Td series (1 - Tdap)    Shingrix Vaccine Age 50> (1 of 2)    FOBT Q 1 YEAR AGE 50-75        Coordination of Care Questionnaire:   1) Have you been to an emergency room, urgent care, or hospitalized since your last visit? If yes, where when, and reason for visit? no       2. Have seen or consulted any other health care provider since your last visit? If yes, where when, and reason for visit? NO      3) Do you have an Advanced Directive/ Living Will in place? NO  If yes, do we have a copy on file NO  If no, would you like information NO    Patient is accompanied by self I have received verbal consent from Maria Eugenia Ramachandran to discuss any/all medical information while they are present in the room.     Visit Vitals  /73 (BP 1 Location: Left arm, BP Patient Position: Sitting)   Pulse 84   Temp 98 °F (36.7 °C) (Oral)   Resp 16   Ht 5' 6\" (1.676 m)   Wt 143 lb (64.9 kg)   SpO2 91%   BMI 23.08 kg/m² None

## 2019-06-21 NOTE — PROGRESS NOTES
Lizandro Lux is a 59 y.o. male who presents for evaluation of adverse drug reaction to pulmicort. Last seen by me April 10, 2019. He is convinced that over past few weeks the pulmicort is causing him to be more tachycardic, short of breath, and causing him to not have any energy, so he stopped using it.       ROS:  Constitutional: negative for fevers, chills, anorexia and weight loss  Eyes:   negative for visual disturbance and irritation  ENT:   negative for tinnitus,sore throat,nasal congestion,ear pain,hoarseness  Respiratory:  negative for cough, hemoptysis, dyspnea,wheezing  CV:   negative for chest pain, palpitations, lower extremity edema  GI:   negative for nausea, vomiting, diarrhea, abdominal pain,melena  Genitourinary: negative for frequency, dysuria and hematuria  Musculoskel: negative for myalgias, arthralgias, back pain, muscle weakness, joint pain  Neurological:  negative for headaches, dizziness, focal weakness, numbness  Psychiatric:     Negative for depression or anxiety      Past Medical History:   Diagnosis Date    Acute myocardial infarction, unspecified site, episode of care unspecified 2/22/2013    CAD (coronary artery disease)     Chest pain 10/15/2014    [facility] [address]     Coronary atherosclerosis of native coronary artery 2/22/2013    Exercise induced bronchospasm 2/22/2013    Hypertension     Hyperthyroidism     Old myocardial infarction 2/22/2013    Tobacco use 5/5/2012       Past Surgical History:   Procedure Laterality Date    CARDIAC SURG PROCEDURE UNLIST      stents may 2012     HX CATARACT REMOVAL Left 02/02/2016    HX OTHER SURGICAL  12/26/2018    Pacemaker       Family History   Problem Relation Age of Onset    Cancer Mother         lung cancer    Heart Disease Father     Thyroid Disease Brother     Thyroid Disease Maternal Aunt        Social History     Socioeconomic History    Marital status:      Spouse name: Not on file    Number of children: Not on file    Years of education: Not on file    Highest education level: Not on file   Occupational History    Not on file   Social Needs    Financial resource strain: Not on file    Food insecurity:     Worry: Not on file     Inability: Not on file    Transportation needs:     Medical: Not on file     Non-medical: Not on file   Tobacco Use    Smoking status: Former Smoker     Packs/day: 1.00     Years: 38.00     Pack years: 38.00     Types: Cigarettes     Last attempt to quit: 3/17/2017     Years since quittin.2    Smokeless tobacco: Never Used   Substance and Sexual Activity    Alcohol use: No     Alcohol/week: 0.0 oz    Drug use: Yes     Types: Marijuana     Comment: OCC.     Sexual activity: Not Currently   Lifestyle    Physical activity:     Days per week: Not on file     Minutes per session: Not on file    Stress: Not on file   Relationships    Social connections:     Talks on phone: Not on file     Gets together: Not on file     Attends Orthodox service: Not on file     Active member of club or organization: Not on file     Attends meetings of clubs or organizations: Not on file     Relationship status: Not on file    Intimate partner violence:     Fear of current or ex partner: Not on file     Emotionally abused: Not on file     Physically abused: Not on file     Forced sexual activity: Not on file   Other Topics Concern    Not on file   Social History Narrative    Not on file            Visit Vitals  /73 (BP 1 Location: Left arm, BP Patient Position: Sitting)   Pulse 84   Temp 98 °F (36.7 °C) (Oral)   Resp 16   Ht 5' 6\" (1.676 m)   Wt 143 lb (64.9 kg)   SpO2 91%   BMI 23.08 kg/m²       Physical Examination:   General - Well appearing male  HEENT - PERRL, TM no erythema/opacification, normal nasal turbinates, no oropharyngeal erythema or exudate, MMM  Neck - supple, no bruits, no thyroidomegaly, no lymphadenopathy  Pulm - mild exp rhonchi bilaterally  Cardio - RRR, normal S1 S2, no murmur  Abd - soft, nontender, no masses, no HSM  Extrem - no edema, +2 distal pulses  Neuro-  No focal deficits, CN intact     Assessment/Plan:    1. Adverse drug reaction--stop pulmicort  2. Copd--switch to trelegy. Stop tudorza  3. Hypothyroid--check tsh, make sure this is not contributing to his symptoms. On synthroid  4. Hx SSS--now with ppm  5. Cad--on asa, coreg, hctz, cozaar    rtc for regular visit.         Guillermina Zendejas III, DO

## 2019-06-22 LAB — TSH SERPL DL<=0.005 MIU/L-ACNC: 1.32 UIU/ML (ref 0.45–4.5)

## 2019-06-28 ENCOUNTER — OFFICE VISIT (OUTPATIENT)
Dept: CARDIOLOGY CLINIC | Age: 64
End: 2019-06-28

## 2019-06-28 DIAGNOSIS — I49.5 SSS (SICK SINUS SYNDROME) (HCC): ICD-10-CM

## 2019-06-28 DIAGNOSIS — Z95.0 CARDIAC PACEMAKER IN SITU: Primary | ICD-10-CM

## 2019-09-30 ENCOUNTER — OFFICE VISIT (OUTPATIENT)
Dept: CARDIOLOGY CLINIC | Age: 64
End: 2019-09-30

## 2019-09-30 DIAGNOSIS — I49.5 SSS (SICK SINUS SYNDROME) (HCC): ICD-10-CM

## 2019-09-30 DIAGNOSIS — Z95.0 CARDIAC PACEMAKER IN SITU: Primary | ICD-10-CM

## 2019-10-10 ENCOUNTER — OFFICE VISIT (OUTPATIENT)
Dept: INTERNAL MEDICINE CLINIC | Age: 64
End: 2019-10-10

## 2019-10-10 VITALS
BODY MASS INDEX: 24.75 KG/M2 | SYSTOLIC BLOOD PRESSURE: 111 MMHG | WEIGHT: 154 LBS | DIASTOLIC BLOOD PRESSURE: 70 MMHG | RESPIRATION RATE: 16 BRPM | HEIGHT: 66 IN | TEMPERATURE: 97.6 F | OXYGEN SATURATION: 96 % | HEART RATE: 72 BPM

## 2019-10-10 DIAGNOSIS — I10 ESSENTIAL HYPERTENSION: ICD-10-CM

## 2019-10-10 DIAGNOSIS — E03.9 ACQUIRED HYPOTHYROIDISM: ICD-10-CM

## 2019-10-10 DIAGNOSIS — I49.5 SSS (SICK SINUS SYNDROME) (HCC): ICD-10-CM

## 2019-10-10 DIAGNOSIS — M25.511 ACUTE PAIN OF RIGHT SHOULDER: ICD-10-CM

## 2019-10-10 DIAGNOSIS — I25.10 ATHEROSCLEROSIS OF NATIVE CORONARY ARTERY OF NATIVE HEART WITHOUT ANGINA PECTORIS: ICD-10-CM

## 2019-10-10 DIAGNOSIS — J44.9 CHRONIC OBSTRUCTIVE PULMONARY DISEASE, UNSPECIFIED COPD TYPE (HCC): Primary | ICD-10-CM

## 2019-10-10 DIAGNOSIS — E78.2 MIXED HYPERLIPIDEMIA: ICD-10-CM

## 2019-10-10 DIAGNOSIS — Z12.11 SCREEN FOR COLON CANCER: ICD-10-CM

## 2019-10-10 RX ORDER — PREDNISONE 20 MG/1
TABLET ORAL
Qty: 18 TAB | Refills: 0 | Status: SHIPPED | OUTPATIENT
Start: 2019-10-10 | End: 2020-07-06 | Stop reason: ALTCHOICE

## 2019-10-10 NOTE — PROGRESS NOTES
Reviewed record in preparation for visit and have obtained necessary documentation. Identified pt with two pt identifiers(name and ). Chief Complaint   Patient presents with    COPD    Cholesterol Problem    Arm Pain     right; x2 weeks; off and on       Health Maintenance Due   Topic Date Due    DTaP/Tdap/Td series (1 - Tdap) 1977    Shingrix Vaccine Age 50> (1 of 2) 2006    FOBT Q 1 YEAR AGE 50-75  2006    Influenza Age 5 to Adult  2019       Mr. Taran Farah has a reminder for a \"due or due soon\" health maintenance. I have asked that he discuss health maintenance topic(s) due with His  primary care provider. Coordination of Care Questionnaire:  :     1) Have you been to an emergency room, urgent care clinic since your last visit? no   Hospitalized since your last visit? no             2) Have you seen or consulted any other health care providers outside of [address] [place] [address] since your last visit? no  (Include any pap smears or colon screenings in this section.)    3) Do you have an Advance Directive on file? no    4) Are you interested in receiving information on Advance Directives? NO    Patient is accompanied by self I have received verbal consent from Alina Bautista to discuss any/all medical information while they are present in the room.

## 2019-10-10 NOTE — PROGRESS NOTES
Sunita Leone is a 62 y.o. male who presents for evaluation of routine follow up. Last seen by me June 21, 2019 when he felt like he was having an adr to [address], which we stopped and switched him to trelegy, which has helped nicely. Overall doing well, though right shoulder and right arm have been painful to him for past few weeks, on/off. ROS:  Constitutional: negative for fevers, chills, anorexia and weight loss  Eyes:   negative for visual disturbance and irritation  ENT:   negative for tinnitus,sore throat,nasal congestion,ear pain,hoarseness  Respiratory:  negative for cough, hemoptysis, dyspnea,wheezing  CV:   negative for chest pain, palpitations, lower extremity edema  GI:   negative for nausea, vomiting, diarrhea, abdominal pain,melena  Genitourinary: negative for frequency, dysuria and hematuria  Musculoskel: negative for myalgias, arthralgias, back pain, muscle weakness.   ++right shoulder joint pain  Neurological:  negative for headaches, dizziness, focal weakness, numbness  Psychiatric:     Negative for depression or anxiety      Past Medical History:   Diagnosis Date    Acute myocardial infarction, unspecified site, episode of care unspecified 2/22/2013    CAD (coronary artery disease)     Chest pain 10/15/2014    COPD [facility]chronic obstructive pulmonary disease) ([facility] [address]     Coronary atherosclerosis of native coronary artery 2/22/2013    Exercise induced bronchospasm 2/22/2013    Hypertension     Hyperthyroidism     Old myocardial infarction 2/22/2013    Tobacco use 5/5/2012       Past Surgical History:   Procedure Laterality Date    CARDIAC SURG PROCEDURE UNLIST      stents may 2012     HX CATARACT REMOVAL Left 02/02/2016    HX OTHER SURGICAL  12/26/2018    Pacemaker    HX PACEMAKER  12/26/2018       Family History   Problem Relation Age of Onset    Cancer Mother         lung cancer    Heart Disease Father     Thyroid Disease Brother     Thyroid Disease Maternal Aunt Social History     Socioeconomic History    Marital status:      Spouse name: Not on file    Number of children: Not on file    Years of education: Not on file    Highest education level: Not on file   Occupational History    Not on file   Social Needs    Financial resource strain: Not on file    Food insecurity:     Worry: Not on file     Inability: Not on file    Transportation needs:     Medical: Not on file     Non-medical: Not on file   Tobacco Use    Smoking status: Current Every Day Smoker     Packs/day: 1.00     Years: 38.00     Pack years: 38.00     Types: Cigarettes    Smokeless tobacco: Never Used   Substance and Sexual Activity    Alcohol use: No     Alcohol/week: 0.0 standard drinks    Drug use: Yes     Types: Marijuana     Comment: OCC.     Sexual activity: Not Currently   Lifestyle    Physical activity:     Days per week: Not on file     Minutes per session: Not on file    Stress: Not on file   Relationships    Social connections:     Talks on phone: Not on file     Gets together: Not on file     Attends [employer] service: Not on file     Active member of club or organization: Not on file     Attends meetings of clubs or organizations: Not on file     Relationship status: Not on file    Intimate partner violence:     Fear of current or ex partner: Not on file     Emotionally abused: Not on file     Physically abused: Not on file     Forced sexual activity: Not on file   Other Topics Concern    Not on file   Social History Narrative    Not on file            Visit Vitals  /70 (BP 1 Location: Left arm, BP Patient Position: Sitting)   Pulse 72   Temp 97.6 °F (36.4 °C) (Oral)   Resp 16   Ht 5' 6\" (1.676 m)   Wt 154 lb (69.9 kg)   SpO2 96%   BMI 24.86 kg/m²       Physical Examination:   General - Well appearing male  HEENT - PERRL, TM no erythema/opacification, normal nasal turbinates, no oropharyngeal erythema or exudate, MMM  Neck - supple, no bruits, no thyroidomegaly, no lymphadenopathy  Pulm - mild/trace exp wheezes bilaterally  Cardio - RRR, normal S1 S2, no murmur  Abd - soft, nontender, no masses, no HSM  Extrem - no edema, +2 distal pulses. ++full ROM with right arm. No rotator cuff weakness. Neuro-  No focal deficits, CN intact     Assessment/Plan:    1. Copd--continue trelegy. Prednisone will help here as well. 2.  Right shoulder strain--rx for prednisone  3. Hypothyroid--on synthroid, check tsh  4. Cad with stents--on asa. Check cbc, cmp  5.  htn--controlled with coreg, hctz, cozaar. 6.  hyperlipids--on pravachol, check flp, cmp  7. Tobacco abuse--    Fit card ordered today.   rtc 6 months        Jennifer Case III, DO

## 2019-10-10 NOTE — PATIENT INSTRUCTIONS
Office Policies    Phone calls/patient messages:            Please allow up to 24 hours for someone in the office to contact you about your call or message. Be mindful your provider may be out of the office or your message may require further review. We encourage you to use [employer] for your messages as this is a faster, more efficient way to communicate with our office                         Medication Refills:            Prescription medications require 48-72 business hours to process. We encourage you to use [employer] for your refills. For controlled medications: Please allow 72 business hours to process. Certain medications may require you to  a written prescription at our office. NO narcotic/controlled medications will be prescribed after 4pm Monday through Friday or on weekends              Form/Paperwork Completion:            Please note a $25 fee may incur for all paperwork for completed by our providers. We ask that you allow 7-10 business days. Pre-payment is due prior to picking up/faxing the completed form. You may also download your forms to [employer] to have your doctor print off. Shoulder Pain: Care Instructions  Your Care Instructions    You can hurt your shoulder by using it too much during an activity, such as fishing or baseball. It can also happen as part of the everyday wear and tear of getting older. Shoulder injuries can be slow to heal, but your shoulder should get better with time. Your doctor may recommend a sling to rest your shoulder. If you have injured your shoulder, you may need testing and treatment. Follow-up care is a key part of your treatment and safety. Be sure to make and go to all appointments, and call your doctor if you are having problems. It's also a good idea to know your test results and keep a list of the medicines you take. How can you care for yourself at home? · Take pain medicines exactly as directed.   ? If the doctor gave you a prescription medicine for pain, take it as prescribed. ? If you are not taking a prescription pain medicine, ask your doctor if you can take an over-the-counter medicine. ? Do not take two or more pain medicines at the same time unless the doctor told you to. Many pain medicines contain acetaminophen, which is Tylenol. Too much acetaminophen (Tylenol) can be harmful. · If your doctor recommends that you wear a sling, use it as directed. Do not take it off before your doctor tells you to. · Put ice or a cold pack on the sore area for 10 to 20 minutes at a time. Put a thin cloth between the ice and your skin. · If there is no swelling, you can put moist heat, a heating pad, or a warm cloth on your shoulder. Some doctors suggest alternating between hot and cold. · Rest your shoulder for a few days. If your doctor recommends it, you can then begin gentle exercise of the shoulder, but do not lift anything heavy. When should you call for help? Call 911 anytime you think you may need emergency care. For example, call if:    · You have chest pain or pressure. This may occur with:  ? Sweating. ? Shortness of breath. ? Nausea or vomiting. ? Pain that spreads from the chest to the neck, jaw, or one or both shoulders or arms. ? Dizziness or lightheadedness. ? A fast or uneven pulse. After calling 911, chew 1 adult-strength aspirin. Wait for an ambulance. Do not try to drive yourself.     · Your arm or hand is cool or pale or changes color.    Call your doctor now or seek immediate medical care if:    · You have signs of infection, such as:  ? Increased pain, swelling, warmth, or redness in your shoulder. ? Red streaks leading from a place on your shoulder. ? Pus draining from an area of your shoulder. ? Swollen lymph nodes in your neck, armpits, or groin.   ? A fever.    Watch closely for changes in your health, and be sure to contact your doctor if:    · You cannot use your shoulder.     · Your shoulder does not get better as expected. Where can you learn more? Go to http://benjamin-irma.info/. Enter D200 in the search box to learn more about \"Shoulder Pain: Care Instructions. \"  Current as of: June 26, 2019  Content Version: 12.2  © 4663-3744 [employer]. Care instructions adapted under license by [employer] (which disclaims liability or warranty for this information). If you have questions about a medical condition or this instruction, always ask your healthcare professional. [address] any warranty or liability for your use of this information. Try not to sleep on your right shoulder. Use ice 15-20 minutes daily, will help decrease swelling and inflammation.

## 2019-10-11 LAB
ALBUMIN SERPL-MCNC: 4 G/DL (ref 3.6–4.8)
ALBUMIN/GLOB SERPL: 1.5 {RATIO} (ref 1.2–2.2)
ALP SERPL-CCNC: 87 IU/L (ref 39–117)
ALT SERPL-CCNC: 38 IU/L (ref 0–44)
AST SERPL-CCNC: 17 IU/L (ref 0–40)
BASOPHILS # BLD AUTO: 0 X10E3/UL (ref 0–0.2)
BASOPHILS NFR BLD AUTO: 1 %
BILIRUB SERPL-MCNC: 0.2 MG/DL (ref 0–1.2)
BUN SERPL-MCNC: 16 MG/DL (ref 8–27)
BUN/CREAT SERPL: 18 (ref 10–24)
CALCIUM SERPL-MCNC: 9.5 MG/DL (ref 8.6–10.2)
CHLORIDE SERPL-SCNC: 94 MMOL/L (ref 96–106)
CHOLEST SERPL-MCNC: 157 MG/DL (ref 100–199)
CO2 SERPL-SCNC: 25 MMOL/L (ref 20–29)
CREAT SERPL-MCNC: 0.87 MG/DL (ref 0.76–1.27)
EOSINOPHIL # BLD AUTO: 0.6 X10E3/UL (ref 0–0.4)
EOSINOPHIL NFR BLD AUTO: 10 %
ERYTHROCYTE [DISTWIDTH] IN BLOOD BY AUTOMATED COUNT: 12.8 % (ref 12.3–15.4)
EST. AVERAGE GLUCOSE BLD GHB EST-MCNC: 120 MG/DL
GLOBULIN SER CALC-MCNC: 2.7 G/DL (ref 1.5–4.5)
GLUCOSE SERPL-MCNC: 135 MG/DL (ref 65–99)
HBA1C MFR BLD: 5.8 % (ref 4.8–5.6)
HCT VFR BLD AUTO: 43.1 % (ref 37.5–51)
HDLC SERPL-MCNC: 37 MG/DL
HGB BLD-MCNC: 14.6 G/DL (ref 13–17.7)
IMM GRANULOCYTES # BLD AUTO: 0 X10E3/UL (ref 0–0.1)
IMM GRANULOCYTES NFR BLD AUTO: 0 %
LDLC SERPL CALC-MCNC: 92 MG/DL (ref 0–99)
LYMPHOCYTES # BLD AUTO: 2.1 X10E3/UL (ref 0.7–3.1)
LYMPHOCYTES NFR BLD AUTO: 34 %
MCH RBC QN AUTO: 31.7 PG (ref 26.6–33)
MCHC RBC AUTO-ENTMCNC: 33.9 G/DL (ref 31.5–35.7)
MCV RBC AUTO: 94 FL (ref 79–97)
MONOCYTES # BLD AUTO: 0.7 X10E3/UL (ref 0.1–0.9)
MONOCYTES NFR BLD AUTO: 12 %
NEUTROPHILS # BLD AUTO: 2.7 X10E3/UL (ref 1.4–7)
NEUTROPHILS NFR BLD AUTO: 43 %
PLATELET # BLD AUTO: 201 X10E3/UL (ref 150–450)
POTASSIUM SERPL-SCNC: 3.9 MMOL/L (ref 3.5–5.2)
PROT SERPL-MCNC: 6.7 G/DL (ref 6–8.5)
PSA SERPL-MCNC: 0.6 NG/ML (ref 0–4)
RBC # BLD AUTO: 4.61 X10E6/UL (ref 4.14–5.8)
SODIUM SERPL-SCNC: 135 MMOL/L (ref 134–144)
TRIGL SERPL-MCNC: 142 MG/DL (ref 0–149)
TSH SERPL DL<=0.005 MIU/L-ACNC: 1.37 UIU/ML (ref 0.45–4.5)
VLDLC SERPL CALC-MCNC: 28 MG/DL (ref 5–40)
WBC # BLD AUTO: 6.1 X10E3/UL (ref 3.4–10.8)

## 2019-10-22 LAB — HEMOCCULT STL QL IA: NEGATIVE

## 2019-12-12 RX ORDER — CARVEDILOL 6.25 MG/1
TABLET ORAL
Qty: 60 TAB | Refills: 11 | Status: SHIPPED | OUTPATIENT
Start: 2019-12-12 | End: 2020-12-04

## 2019-12-12 RX ORDER — ALBUTEROL SULFATE 90 UG/1
2 AEROSOL, METERED RESPIRATORY (INHALATION)
Qty: 3 INHALER | Refills: 3 | Status: SHIPPED | OUTPATIENT
Start: 2019-12-12 | End: 2020-12-29

## 2019-12-30 ENCOUNTER — OFFICE VISIT (OUTPATIENT)
Dept: CARDIOLOGY CLINIC | Age: 64
End: 2019-12-30

## 2019-12-30 DIAGNOSIS — I49.5 SSS (SICK SINUS SYNDROME) (HCC): ICD-10-CM

## 2019-12-30 DIAGNOSIS — Z95.0 CARDIAC PACEMAKER IN SITU: Primary | ICD-10-CM

## 2020-01-27 ENCOUNTER — TELEPHONE (OUTPATIENT)
Dept: INTERNAL MEDICINE CLINIC | Age: 65
End: 2020-01-27

## 2020-01-27 DIAGNOSIS — E03.9 ACQUIRED HYPOTHYROIDISM: Primary | ICD-10-CM

## 2020-01-27 NOTE — TELEPHONE ENCOUNTER
Called, spoke to pt. Two identifiers confirmed. Pt stated his thyroid medication is making him sick. Pt stated he took his synthroid as prescribed and noticed that he feels very sick for the rest of the day. Pt has stopped taking synthroid and s/s went away. Notified pt I would send message to Dr. Tyra Lockhart.  Pt verbalized understanding of information discussed w/ no further questions at this time.

## 2020-01-27 NOTE — TELEPHONE ENCOUNTER
----- Message from Nancy Sandoval sent at 1/27/2020  9:22 AM EST -----  Regarding: Dr. Lisa Horton: [phone]  Caller's first and last name and relationship to patient (if not the patient):    Best contact number:[phone]    Preferred date and time: first appt available    Scheduled appointment date and time: No appt available    Reason for appointment: stopped taking thyroid medication    Details to clarify the request: Pt stated he stopped taking his thyroid medication due to it making him sick. Pt requesting a call for an appt.

## 2020-01-28 ENCOUNTER — TELEPHONE (OUTPATIENT)
Dept: INTERNAL MEDICINE CLINIC | Age: 65
End: 2020-01-28

## 2020-01-28 NOTE — TELEPHONE ENCOUNTER
Pt states he didn't know the name of specialist you referred him to but the number is not correct. Please call pt with correct number.

## 2020-01-28 NOTE — TELEPHONE ENCOUNTER
Called, spoke to pt. Two identifiers confirmed. Contact information provided again to pt. Pt verbalized understanding of information discussed w/ no further questions at this time.

## 2020-01-28 NOTE — TELEPHONE ENCOUNTER
Juan Alberto Ruvalcaba, DO  You 15 hours ago (5:13 PM)     Then he should see endocrine.  He needs to continue to try to take the med on an empty stomach, 30 minutes before other foods and meds. Routing comment      Called, spoke to pt. Two identifiers confirmed. Notified pt of Dr. Miguel Angel Ty recommendations. Contact information provided to pt for [address]. Pt verbalized understanding of information discussed w/ no further questions at this time.

## 2020-02-17 RX ORDER — LOSARTAN POTASSIUM 50 MG/1
TABLET ORAL
Qty: 60 TAB | Refills: 1 | Status: SHIPPED | OUTPATIENT
Start: 2020-02-17 | End: 2020-04-17

## 2020-02-17 NOTE — TELEPHONE ENCOUNTER
Spoke to patient using 2 identifiers. He was made aware that he needs to make an appt first.  Last seen in 11/2018. Will then request for a refill to last him until appt date once established. Patient verbalized understanding.

## 2020-04-08 RX ORDER — PRAVASTATIN SODIUM 40 MG/1
TABLET ORAL
Qty: 30 TAB | Refills: 1 | Status: SHIPPED | OUTPATIENT
Start: 2020-04-08 | End: 2020-06-05

## 2020-04-09 ENCOUNTER — TELEPHONE (OUTPATIENT)
Dept: INTERNAL MEDICINE CLINIC | Age: 65
End: 2020-04-09

## 2020-04-09 NOTE — TELEPHONE ENCOUNTER
Diana Guerrero, DO  You 44 minutes ago (1:04 PM)     Sounds like he should go to [place] [facility] to be seen in person. Routing comment      Pt notified. Pt declined going to [facility] d/t COVID.

## 2020-04-09 NOTE — TELEPHONE ENCOUNTER
Received triage call from pt. Pt c/o difficulty breathing. Pt stated for the past week and a half he has had increased shortness of breath. Pt audibly wheezing on the phone. Pt also has not been able to sleep stating that when he lays down, he is unable to breath. Pt currently using trelegy inhaler and stated it is no longer working. Notified pt I would send message to Dr. Estrella Rodgers.  Pt verbalized understanding of information discussed w/ no further questions at this time.

## 2020-04-10 ENCOUNTER — VIRTUAL VISIT (OUTPATIENT)
Dept: INTERNAL MEDICINE CLINIC | Age: 65
End: 2020-04-10

## 2020-04-10 DIAGNOSIS — J44.9 CHRONIC OBSTRUCTIVE PULMONARY DISEASE, UNSPECIFIED COPD TYPE (HCC): ICD-10-CM

## 2020-04-10 DIAGNOSIS — F41.0 PANIC ATTACKS: Primary | ICD-10-CM

## 2020-04-10 DIAGNOSIS — E03.9 ACQUIRED HYPOTHYROIDISM: ICD-10-CM

## 2020-04-10 DIAGNOSIS — F41.9 ANXIETY: ICD-10-CM

## 2020-04-10 RX ORDER — BUSPIRONE HYDROCHLORIDE 5 MG/1
5 TABLET ORAL 2 TIMES DAILY
Qty: 60 TAB | Refills: 5 | Status: SHIPPED | OUTPATIENT
Start: 2020-04-10 | End: 2020-04-21

## 2020-04-10 NOTE — PROGRESS NOTES
Geraldine Sanchez is a 62 y.o. male evaluated via telephone on 4/10/2020. Consent:  He and/or health care decision maker is aware that that he may receive a bill for this telephone service, depending on his insurance coverage, and has provided verbal consent to proceed: Yes      Documentation:  I communicated with the patient and/or health care decision maker about anxiety and panic attacks. Details of this discussion including any medical advice provided: start buspar      I affirm this is a Patient Initiated Episode with an Established Patient who has not had a related appointment within my department in the past 7 days or scheduled within the next 24 hours. Total Time: minutes: 11-20 minutes    Note: not billable if this call serves to triage the patient into an appointment for the relevant concern      1. Panic attacks due to covid 19--rx for busar 5 mg bid. Take just once daily for first week, then incease to bid. 2.  Hypothyroid--could only tolerate 50 mcg of synthroid daily. Has appt with endo in June (he states the 100 mcg dose caused his arm to hurt and made him nauseated)  3. Copd--doing well with trelegy, but panic attacks have caused him to have increased sob spells  4. Cad with stents--on asa, coreg, cozaar  5.  htn--continue hctz, coreg, cozaar  6.   Tobacco abuse--has cut back considerably    rtc 3 months      Monika Chao III, DO

## 2020-04-10 NOTE — PATIENT INSTRUCTIONS
Office Policies Phone calls/patient messages: Please allow up to 24 hours for someone in the office to contact you about your call or message. Be mindful your provider may be out of the office or your message may require further review. We encourage you to use [employer] for your messages as this is a faster, more efficient way to communicate with our office Medication Refills: 
         
Prescription medications require 48-72 business hours to process. We encourage you to use [employer] for your refills. For controlled medications: Please allow 72 business hours to process. Certain medications may require you to  a written prescription at our office. NO narcotic/controlled medications will be prescribed after 4pm Monday through Friday or on weekends Form/Paperwork Completion: 
         
Please note a $25 fee may incur for all paperwork for completed by our providers. We ask that you allow 7-10 business days. Pre-payment is due prior to picking up/faxing the completed form. You may also download your forms to [employer] to have your doctor print off. Start buspar 5 mg each day for 1st week, then if needed, increase to 5 mg bid.

## 2020-04-21 ENCOUNTER — TELEPHONE (OUTPATIENT)
Dept: INTERNAL MEDICINE CLINIC | Age: 65
End: 2020-04-21

## 2020-04-21 RX ORDER — BUSPIRONE HYDROCHLORIDE 5 MG/1
5 TABLET ORAL
Qty: 90 TAB | Refills: 5 | Status: SHIPPED | OUTPATIENT
Start: 2020-04-21 | End: 2020-07-06 | Stop reason: SINTOL

## 2020-04-21 NOTE — TELEPHONE ENCOUNTER
Nick Snell III, DO  You 6 minutes ago (10:26 AM)     Rx sent in to increase to tid. Routing comment      Pt notified.

## 2020-04-21 NOTE — TELEPHONE ENCOUNTER
#[phone] pt states the new medication is working well for him, but he can not sleep at all. He states if he doesn't take the medication it makes him nervous/anxiety. Pt states at times he has to take three per day instead of two per day. Please call to discuss.

## 2020-04-23 ENCOUNTER — TELEPHONE (OUTPATIENT)
Dept: INTERNAL MEDICINE CLINIC | Age: 65
End: 2020-04-23

## 2020-04-23 NOTE — TELEPHONE ENCOUNTER
Saurav Angeltingham, DO  You 11 minutes ago (10:45 AM)     Doubt due to melatonin, but stop it.   Buspar should help with sleep.  No plans to rx any other sleeping meds. Routing comment      Pt notified.

## 2020-04-23 NOTE — TELEPHONE ENCOUNTER
Suzi Landon, [address] [facility] [employer]             Pt is stating that he was prescribed \"Melatonin\" to help him sleep, but he thinks he is allergic to it and would like another sleep medication. Pt is also stating that he tried the Melatonin two nights in a row and the same thing happened each time, his shoulders started to hurt severely and he did not get any sleep in a few days. Best contact number [phone].        Copy/Paste  ENVERA

## 2020-04-24 ENCOUNTER — HOSPITAL ENCOUNTER (EMERGENCY)
Age: 65
Discharge: HOME | End: 2020-04-24
Attending: EMERGENCY MEDICINE
Payer: MEDICAID

## 2020-04-24 ENCOUNTER — APPOINTMENT (OUTPATIENT)
Dept: GENERAL RADIOLOGY | Age: 65
End: 2020-04-24
Attending: EMERGENCY MEDICINE
Payer: MEDICAID

## 2020-04-24 ENCOUNTER — OFFICE VISIT (OUTPATIENT)
Dept: CARDIOLOGY CLINIC | Age: 65
End: 2020-04-24

## 2020-04-24 ENCOUNTER — TELEPHONE (OUTPATIENT)
Dept: INTERNAL MEDICINE CLINIC | Age: 65
End: 2020-04-24

## 2020-04-24 VITALS
DIASTOLIC BLOOD PRESSURE: 80 MMHG | RESPIRATION RATE: 18 BRPM | TEMPERATURE: 97.7 F | BODY MASS INDEX: 23.54 KG/M2 | HEIGHT: 67 IN | OXYGEN SATURATION: 94 % | HEART RATE: 76 BPM | SYSTOLIC BLOOD PRESSURE: 103 MMHG | WEIGHT: 150 LBS

## 2020-04-24 DIAGNOSIS — Z95.0 CARDIAC PACEMAKER IN SITU: Primary | ICD-10-CM

## 2020-04-24 DIAGNOSIS — G47.00 INSOMNIA, UNSPECIFIED TYPE: Primary | ICD-10-CM

## 2020-04-24 DIAGNOSIS — F41.9 ANXIETY: ICD-10-CM

## 2020-04-24 DIAGNOSIS — F41.0 PANIC ATTACKS: Primary | ICD-10-CM

## 2020-04-24 DIAGNOSIS — I49.5 SSS (SICK SINUS SYNDROME) (HCC): ICD-10-CM

## 2020-04-24 DIAGNOSIS — F41.0 PANIC ANXIETY SYNDROME: ICD-10-CM

## 2020-04-24 PROCEDURE — 71045 X-RAY EXAM CHEST 1 VIEW: CPT

## 2020-04-24 PROCEDURE — 74011250637 HC RX REV CODE- 250/637: Performed by: EMERGENCY MEDICINE

## 2020-04-24 PROCEDURE — 99283 EMERGENCY DEPT VISIT LOW MDM: CPT

## 2020-04-24 RX ORDER — ALPRAZOLAM 0.5 MG/1
0.5 TABLET ORAL
Qty: 12 TAB | Refills: 0 | Status: SHIPPED | OUTPATIENT
Start: 2020-04-24 | End: 2020-08-11

## 2020-04-24 RX ORDER — ALPRAZOLAM 0.25 MG/1
0.5 TABLET ORAL
Status: COMPLETED | OUTPATIENT
Start: 2020-04-24 | End: 2020-04-24

## 2020-04-24 RX ADMIN — ALPRAZOLAM 0.5 MG: 0.25 TABLET ORAL at 05:28

## 2020-04-24 NOTE — DISCHARGE INSTRUCTIONS
Patient Education        Insomnia: Care Instructions  Your Care Instructions    Insomnia is the inability to sleep well. It is a common problem for most people at some time. Insomnia may make it hard for you to get to sleep, stay asleep, or sleep as long as you need to. This can make you tired and grouchy during the day. It can also make you forgetful, less effective at work, and unhappy. Insomnia can be caused by conditions such as depression or anxiety. Pain can also affect your ability to sleep. When these problems are solved, the insomnia usually clears up. But sometimes bad sleep habits can cause insomnia. If insomnia is affecting your work or your enjoyment of life, you can take steps to improve your sleep. Follow-up care is a key part of your treatment and safety. Be sure to make and go to all appointments, and call your doctor if you are having problems. It's also a good idea to know your test results and keep a list of the medicines you take. How can you care for yourself at home? What to avoid  · Do not have drinks with caffeine, such as coffee or black tea, for 8 hours before bed. · Do not smoke or use other types of tobacco near bedtime. Nicotine is a stimulant and can keep you awake. · Avoid drinking alcohol late in the evening, because it can cause you to wake in the middle of the night. · Do not eat a big meal close to bedtime. If you are hungry, eat a light snack. · Do not drink a lot of water close to bedtime, because the need to urinate may wake you up during the night. · Do not read or watch TV in bed. Use the bed only for sleeping and sexual activity. What to try  · Go to bed at the same time every night, and wake up at the same time every morning. Do not take naps during the day. · Keep your bedroom quiet, dark, and cool. · Sleep on a comfortable pillow and mattress. · If watching the clock makes you anxious, turn it facing away from you so you cannot see the time.   · If you worry when you lie down, start a worry book. Well before bedtime, write down your worries, and then set the book and your concerns aside. · Try meditation or other relaxation techniques before you go to bed. · If you cannot fall asleep, get up and go to another room until you feel sleepy. Do something relaxing. Repeat your bedtime routine before you go to bed again. · Make your house quiet and calm about an hour before bedtime. Turn down the lights, turn off the TV, log off the computer, and turn down the volume on music. This can help you relax after a busy day. When should you call for help? Watch closely for changes in your health, and be sure to contact your doctor if:    · Your efforts to improve your sleep do not work.     · Your insomnia gets worse.     · You have been feeling down, depressed, or hopeless or have lost interest in things that you usually enjoy. Where can you learn more? Go to http://benjamin-irma.info/  Enter P513 in the search box to learn more about \"Insomnia: Care Instructions. \"  Current as of: December 15, 2019Content Version: 12.4  © 9490-6092 [employer]. Care instructions adapted under license by [employer] (which disclaims liability or warranty for this information). If you have questions about a medical condition or this instruction, always ask your healthcare professional. [address] any warranty or liability for your use of this information. Patient Education        Panic Attacks: Care Instructions  Your Care Instructions    During a panic attack, you may have a feeling of intense fear or terror, trouble breathing, chest pain or tightness, heartbeat changes, dizziness, sweating, and shaking. A panic attack starts suddenly and usually lasts from 5 to 20 minutes but may last even longer. You have the most anxiety about 10 minutes after the attack starts.  An attack can begin with a stressful event, or it can happen without a cause. Although panic attacks can cause scary symptoms, you can learn to manage them with self-care, counseling, and medicine. Follow-up care is a key part of your treatment and safety. Be sure to make and go to all appointments, and call your doctor if you are having problems. It's also a good idea to know your test results and keep a list of the medicines you take. How can you care for yourself at home? · Take your medicine exactly as directed. Call your doctor if you think you are having a problem with your medicine. · Go to your counseling sessions and follow-up appointments. · Recognize and accept your anxiety. Then, when you are in a situation that makes you anxious, say to yourself, \"This is not an emergency. I feel uncomfortable, but I am not in danger. I can keep going even if I feel anxious. \"  · Be kind to your body:  ? Relieve tension with exercise or a massage. ? Get enough rest.  ? Avoid alcohol, caffeine, nicotine, and illegal drugs. They can increase your anxiety level, cause sleep problems, or trigger a panic attack. ? Learn and do relaxation techniques. See below for more about these techniques. · Engage your mind. Get out and do something you enjoy. Go to a funny movie, or take a walk or hike. Plan your day. Having too much or too little to do can make you anxious. · Keep a record of your symptoms. Discuss your fears with a good friend or family member, or join a support group for people with similar problems. Talking to others sometimes relieves stress. · Get involved in social groups, or volunteer to help others. Being alone sometimes makes things seem worse than they are. · Get at least 30 minutes of exercise on most days of the week to relieve stress. Walking is a good choice. You also may want to do other activities, such as running, swimming, cycling, or playing tennis or team sports. Relaxation techniques  Do relaxation exercises for 10 to 20 minutes a day.  You can play soothing, relaxing music while you do them, if you wish. · Tell others in your house that you are going to do your relaxation exercises. Ask them not to disturb you. · Find a comfortable place, away from all distractions and noise. · Lie down on your back, or sit with your back straight. · Focus on your breathing. Make it slow and steady. · Breathe in through your nose. Breathe out through either your nose or mouth. · Breathe deeply, filling up the area between your navel and your rib cage. Breathe so that your belly goes up and down. · Do not hold your breath. · Breathe like this for 5 to 10 minutes. Notice the feeling of calmness throughout your whole body. As you continue to breathe slowly and deeply, relax by doing the following for another 5 to 10 minutes:  · Tighten and relax each muscle group in your body. You can begin at your toes and work your way up to your head. · Imagine your muscle groups relaxing and becoming heavy. · Empty your mind of all thoughts. · Let yourself relax more and more deeply. · Become aware of the state of calmness that surrounds you. · When your relaxation time is over, you can bring yourself back to alertness by moving your fingers and toes and then your hands and feet and then stretching and moving your entire body. Sometimes people fall asleep during relaxation, but they usually wake up shortly afterward. · Always give yourself time to return to full alertness before you drive a car or do anything that might cause an accident if you are not fully alert. Never play a relaxation tape while driving a car. When should you call for help? Call 911 anytime you think you may need emergency care.  For example, call if:    · You feel you cannot stop from hurting yourself or someone else.    Watch closely for changes in your health, and be sure to contact your doctor if:    · Your panic attacks get worse.     · You have new or different anxiety.     · You are not getting better as expected. Where can you learn more? Go to http://benjamin-irma.info/  Enter H601 in the search box to learn more about \"Panic Attacks: Care Instructions. \"  Current as of: May 28, 2019Content Version: 12.4  © 0198-2378 [employer]. Care instructions adapted under license by [employer] (which disclaims liability or warranty for this information). If you have questions about a medical condition or this instruction, always ask your healthcare professional. [address] any warranty or liability for your use of this information.

## 2020-04-24 NOTE — ED NOTES
Pt arrived to ED via ems with c/oPt reports insomnia for the last 3-4 days after beginning buspirone 5 mg 3x daily . Pt is in no acute distress. Will continue to monitor. See nursing assessment. Safety precautions in place; call light within reach. Emergency Department Nursing Plan of Care       The Nursing Plan of Care is developed from the Nursing assessment and Emergency Department Attending provider initial evaluation. The plan of care may be reviewed in the ED Provider note.     The Plan of Care was developed with the following considerations:   Patient / Family readiness to learn indicated by:verbalized understanding  Persons(s) to be included in education: patient  Barriers to Learning/Limitations:No    Signed     Gagandeep Westbrook RN    4/24/2020   5:17 AM

## 2020-04-24 NOTE — ED PROVIDER NOTES
Mr. Farooq Jang is a 61-year-old gentleman with a history of COPD, coronary artery disease, hypertension, previous MI, hypothyroidism who presents with chief complaint of recent panic attacks. He called his MD and on 4/20/2020 was prescribed buspirone for this. He states that since starting that medication he has not been able to sleep. He lays down and has to get right back up, stating he is unable to fall asleep, states \"insides feel funny,\" and he feels he has to sleep with air on. Denies current pain or dyspnea. He also mentions that his cardiology appointment with Dr. Alisa Garcia was postponed until June which has further increase his anxiety. Sleep Problem   Pertinent negatives include no chest pain, no abdominal pain and no shortness of breath.         Past Medical History:   Diagnosis Date    Acute myocardial infarction, unspecified site, episode of care unspecified 2/22/2013    CAD (coronary artery disease)     Chest pain 10/15/2014    COPD (chronic obstructive pulmonary disease) (HCC)     Coronary atherosclerosis of native coronary artery 2/22/2013    Exercise induced bronchospasm 2/22/2013    Hypertension     Hyperthyroidism     Old myocardial infarction 2/22/2013    Tobacco use 5/5/2012       Past Surgical History:   Procedure Laterality Date    CARDIAC SURG PROCEDURE UNLIST      stents may 2012     HX CATARACT REMOVAL Left 02/02/2016    HX OTHER SURGICAL  12/26/2018    Pacemaker    HX PACEMAKER  12/26/2018         Family History:   Problem Relation Age of Onset    Cancer Mother         lung cancer    Heart Disease Father     Thyroid Disease Brother     Thyroid Disease Maternal Aunt        Social History     Socioeconomic History    Marital status:      Spouse name: Not on file    Number of children: Not on file    Years of education: Not on file    Highest education level: Not on file   Occupational History    Not on file   Social Needs    Financial resource strain: Not on file    Food insecurity     Worry: Not on file     Inability: Not on file    Transportation needs     Medical: Not on file     Non-medical: Not on file   Tobacco Use    Smoking status: Current Every Day Smoker     Packs/day: 1.00     Years: 38.00     Pack years: 38.00     Types: Cigarettes    Smokeless tobacco: Never Used   Substance and Sexual Activity    Alcohol use: No     Alcohol/week: 0.0 standard drinks    Drug use: Yes     Types: Marijuana     Comment: sevevral weeks ago    Sexual activity: Not Currently   Lifestyle    Physical activity     Days per week: Not on file     Minutes per session: Not on file    Stress: Not on file   Relationships    Social connections     Talks on phone: Not on file     Gets together: Not on file     Attends Zoroastrian service: Not on file     Active member of club or organization: Not on file     Attends meetings of clubs or organizations: Not on file     Relationship status: Not on file    Intimate partner violence     Fear of current or ex partner: Not on file     Emotionally abused: Not on file     Physically abused: Not on file     Forced sexual activity: Not on file   Other Topics Concern    Not on file   Social History Narrative    Not on file         ALLERGIES: Bupropion; Chantix [varenicline]; Codeine; Fluticasone; Melatonin; and Nasonex [mometasone]    Review of Systems   Constitutional: Negative. Negative for fever. HENT: Negative. Negative for drooling, facial swelling and trouble swallowing. Eyes: Negative. Negative for discharge and redness. Respiratory: Negative. Negative for chest tightness, shortness of breath and wheezing. Cardiovascular: Negative. Negative for chest pain. Gastrointestinal: Negative. Negative for abdominal distention, abdominal pain, constipation, diarrhea, nausea and vomiting. Endocrine: Negative. Genitourinary: Negative. Negative for difficulty urinating and dysuria. Musculoskeletal: Negative.   Negative for arthralgias and myalgias. Skin: Negative. Negative for color change and rash. Allergic/Immunologic: Negative. Neurological: Negative. Negative for syncope, facial asymmetry and speech difficulty. Hematological: Negative. Psychiatric/Behavioral: Negative for agitation and confusion. The patient is nervous/anxious. All other systems reviewed and are negative. Vitals:    04/24/20 0509   BP: 103/80   Pulse: 76   Resp: 18   Temp: 97.7 °F (36.5 °C)   SpO2: 94%   Weight: 68 kg (150 lb)   Height: 5' 7\" (1.702 m)            Physical Exam  Vitals signs and nursing note reviewed. Constitutional:       Appearance: Normal appearance. He is well-developed. HENT:      Head: Normocephalic and atraumatic. Eyes:      Conjunctiva/sclera: Conjunctivae normal.   Neck:      Musculoskeletal: Neck supple. Cardiovascular:      Rate and Rhythm: Normal rate and regular rhythm. Pulmonary:      Effort: No accessory muscle usage or respiratory distress. Breath sounds: Normal breath sounds. Abdominal:      Palpations: Abdomen is soft. Tenderness: There is no abdominal tenderness. Musculoskeletal: Normal range of motion. Skin:     General: Skin is warm and dry. Neurological:      Mental Status: He is alert and oriented to person, place, and time. Psychiatric:         Behavior: Behavior normal.         Thought Content: Thought content normal.          MDM  Number of Diagnoses or Management Options  Insomnia, unspecified type:   Panic anxiety syndrome:   Diagnosis management comments: Per up-to-date, though the most common side effects from buspirone is drowsiness, a small percent of people do experience restlessness, nervousness, and excitement with buspirone. Advised patient to discontinue this medication. Given his paradoxical reaction to buspirone, will favor benzo for anxiety as opposed to antihistamine because I am concerned he could have a similar paradoxical reaction to antihistamine. Will check chest x-ray before discharge to make sure that his sensation that he cannot sleep when he lays flat is not secondary to a pulmonary issue. Then plan discharge on benzos PRN until he can follow-up with primary care. Given his cardiac history, patient encouraged to follow-up with his cardiologist as scheduled and return if worse    Patient felt completely better after ativan and cxr was negative for acute process. Procedures    LABORATORY TESTS:  No results found for this or any previous visit (from the past 12 hour(s)). IMAGING RESULTS:  XR CHEST PORT   Final Result   IMPRESSION: No evidence of acute cardiopulmonary process. MEDICATIONS GIVEN:  Medications   ALPRAZolam (XANAX) tablet 0.5 mg (0.5 mg Oral Given 4/24/20 0528)       IMPRESSION:  1. Insomnia, unspecified type    2. Panic anxiety syndrome        PLAN:  1. Discharge Medication List as of 4/24/2020  6:42 AM      START taking these medications    Details   ALPRAZolam (Xanax) 0.5 mg tablet Take 1 Tab by mouth every eight (8) hours as needed for Anxiety. Max Daily Amount: 1.5 mg., Print, Disp-12 Tab, R-0         CONTINUE these medications which have NOT CHANGED    Details   busPIRone (BUSPAR) 5 mg tablet Take 1 Tab by mouth three (3) times daily (with meals). , Normal, Disp-90 Tab, R-5      losartan (COZAAR) 50 mg tablet TAKE TWO TABLETS BY MOUTH DAILY, Normal, Disp-90 Tab, R-3      pravastatin (PRAVACHOL) 40 mg tablet TAKE ONE TABLET BY MOUTH DAILY, Normal, Disp-30 Tab, R-1      levothyroxine (SYNTHROID) 100 mcg tablet TAKE ONE TABLET BY MOUTH DAILY BEFORE BREAKFAST, Normal, Disp-90 Tab, R-3      carvedilol (COREG) 6.25 mg tablet TAKE ONE TABLET BY MOUTH TWICE A DAY, Normal, Disp-60 Tab, R-11      albuterol (PROAIR HFA) 90 mcg/actuation inhaler Take 2 Puffs by inhalation every six (6) hours as needed for Wheezing., Normal, Disp-3 Inhaler, R-3      predniSONE (DELTASONE) 20 mg tablet 60 mg daily x 6 days, then stop. , Normal, Disp-18 Tab, R-0      fluticasone-umeclidinium-vilanterol (TRELEGY ELLIPTA) 100-62.5-25 mcg inhaler Take 1 Puff by inhalation daily. , Normal, Disp-3 Inhaler, R-4      hydroCHLOROthiazide (HYDRODIURIL) 12.5 mg tablet TAKE TWO TABLETS BY MOUTH DAILY, Normal, Disp-60 Tab, R-11      acetaminophen (TYLENOL EXTRA STRENGTH) 500 mg tablet Take  by mouth every six (6) hours as needed for Pain., Historical Med      nitroglycerin (NITROSTAT) 0.4 mg SL tablet 1 Tab by SubLINGual route every five (5) minutes as needed for Chest Pain., Normal, Disp-1 Bottle, R-11      inhalational spacing device 1 Each by Does Not Apply route as needed. , Print, Disp-1 Device, R-0      aspirin delayed-release 81 mg tablet Take 1 Tab by mouth daily. , Print, Disp-30 Tab, R-11           2.    Follow-up Information     Follow up With Specialties Details Why Aysha [address], Jesse Rodriguez DO Internal Medicine Schedule an appointment as soon as possible for a visit  [address] [place] [address]  [phone]      [facility] EMERGENCY DEPT Emergency Medicine  As needed, If symptoms worsen [place]  [phone]        Return to ED if worse

## 2020-04-24 NOTE — ED NOTES
Assumed pt care for task only. Patient discharged to home at this time with self and sister. Patient provided with written instructions and 1 written prescriptions. All questions answered. Patient discharged via wheelchair.

## 2020-04-24 NOTE — TELEPHONE ENCOUNTER
Aurora Ayoub, DO  You 2 hours ago (10:16 AM)                                                                                                                                                       I don't have plans to continue with xanax long term, it is too addictive for him. Rima Rocha him make an appt to see Abner Hunter. Routing comment         Pt notified. Contact information provided for psych. Referral placed.

## 2020-04-24 NOTE — TELEPHONE ENCOUNTER
Called, spoke to pt. Two identifiers confirmed. Pt stated he went to the ED last night d/t insomnia. Pt was given ativan while there and was finally able to get a good nights sleep. Pt stated he was advised by the ER doctor to stop taking buspar and start new medication, xanax. Pt requesting a prescription for xanax stating it is the only medication that helps him sleep and keeps him calm. Notified pt Dr. Santos Moscoso typically does not prescribe this medication but a message will be sent to Dr. Santos Moscoso.   Pt verbalized understanding of information discussed w/ no further questions at this time.

## 2020-04-27 ENCOUNTER — PATIENT OUTREACH (OUTPATIENT)
Dept: CASE MANAGEMENT | Age: 65
End: 2020-04-27

## 2020-04-27 NOTE — PROGRESS NOTES
Patient contacted regarding recent discharge and COVID-19 risk   Care Transition Nurse/ Ambulatory Care Manager contacted the patient by telephone to perform post discharge assessment. Verified name and  with patient as identifiers. Patient has following risk factors of: COPD. CTN/ACM reviewed discharge instructions, medical action plan and red flags related to discharge diagnosis. Reviewed and educated them on any new and changed medications related to discharge diagnosis. Advised obtaining a 90-day supply of all daily and as-needed medications. Education provided regarding infection prevention, and signs and symptoms of COVID-19 and when to seek medical attention with patient who verbalized understanding. Discussed exposure protocols and quarantine from [address] you at higher risk for severe illness  and given an opportunity for questions and concerns. The patient agrees to contact the COVID-19 hotline [phone] or PCP office for questions related to their healthcare. CTN/ACM provided contact information for future reference. From CDC: Are you at higher risk for severe illness?  Wash your hands often.  Avoid close contact (6 feet, which is about two arm lengths) with people who are sick.  Put distance between yourself and other people if COVID-19 is spreading in your community.  Clean and disinfect frequently touched surfaces.  Avoid all cruise travel and non-essential air travel.  Call your healthcare professional if you have concerns about COVID-19 and your underlying condition or if you are sick. For more information on steps you can take to protect yourself, see CDC's How to Protect Yourself      Patient/family/caregiver given information for Erika Mendez and agrees to enroll no  Patient's preferred e-mail:  n/a  Patient's preferred phone number: n/a  Based on Loop alert triggers, patient will be contacted by nurse care manager for worsening symptoms.     Plan for follow-up call in 7-14 days based on severity of symptoms and risk factors.

## 2020-04-29 ENCOUNTER — TELEPHONE (OUTPATIENT)
Dept: INTERNAL MEDICINE CLINIC | Age: 65
End: 2020-04-29

## 2020-04-29 NOTE — TELEPHONE ENCOUNTER
Pt was referred to [facility] to check on after ED visit for anxiety and med reaction. Pt was very verbal, upset, about what happened. Pt had a panic attack and was rx buspar, he took it for a few days, had an adverse reaction to it and couldn't sleep. Went to ED and was given xanax, which calmed him down. Now pt wants rx for xanax and does not understand why he can't have it. [facility] tried to explain that xanax is usually not prescribed long-term because it is addicting, but pt did not want to hear. [facility] offered referral to psychiatrist or for counselor to help with his anxiety, but pt refused. Pt said counseling wouldn't help. Pt said he knows that he was watching the news too much and it made him so anxious about COVID. Said he only watches the news briefly now in the morning. He is trying to take care of himself, limits going out,wears a mask, uses hand . He also said he has cut down on his caffeine and his smoking. [facility] informed pt that if he changes his mind about referral for psychiatrist or counseling to let his dr or nurse know, pt understood.

## 2020-05-11 ENCOUNTER — PATIENT OUTREACH (OUTPATIENT)
Dept: CASE MANAGEMENT | Age: 65
End: 2020-05-11

## 2020-05-11 NOTE — PROGRESS NOTES
Patient resolved from Transition of Care episode on 5/11/20. Patient/family has been provided the following resources and education related to COVID-19:                         Signs, symptoms and red flags related to COVID-19            CDC exposure and quarantine guidelines            [facility] exposure contact - [phone]            Contact for their local Department of Health               Patient currently reports that the following symptoms have improved:  no new symptoms and no worsening symptoms. No further outreach scheduled with this CTN/ACM. Episode of Care resolved. Patient has this CTN/ACM contact information if future needs arise.

## 2020-06-22 RX ORDER — FLUTICASONE FUROATE, UMECLIDINIUM BROMIDE AND VILANTEROL TRIFENATATE 100; 62.5; 25 UG/1; UG/1; UG/1
POWDER RESPIRATORY (INHALATION)
Qty: 60 BLISTER | Refills: 3 | Status: SHIPPED | OUTPATIENT
Start: 2020-06-22 | End: 2020-10-18

## 2020-06-26 NOTE — PROGRESS NOTES
1. Have you been to the ER, urgent care clinic since your last visit? Hospitalized since your last visit? Yes When: on 4/24/20 for sleep problems    2. Have you seen or consulted any other health care providers outside of the [address] [place] [address] since your last visit? Include any pap smears or colon screening.  No

## 2020-06-29 ENCOUNTER — OFFICE VISIT (OUTPATIENT)
Dept: CARDIOLOGY CLINIC | Age: 65
End: 2020-06-29

## 2020-06-29 ENCOUNTER — CLINICAL SUPPORT (OUTPATIENT)
Dept: CARDIOLOGY CLINIC | Age: 65
End: 2020-06-29

## 2020-06-29 VITALS
SYSTOLIC BLOOD PRESSURE: 96 MMHG | HEART RATE: 88 BPM | DIASTOLIC BLOOD PRESSURE: 62 MMHG | RESPIRATION RATE: 18 BRPM | HEIGHT: 67 IN | WEIGHT: 159.4 LBS | OXYGEN SATURATION: 94 % | BODY MASS INDEX: 25.02 KG/M2

## 2020-06-29 DIAGNOSIS — I49.5 SSS (SICK SINUS SYNDROME) (HCC): ICD-10-CM

## 2020-06-29 DIAGNOSIS — Z95.0 CARDIAC PACEMAKER IN SITU: Primary | ICD-10-CM

## 2020-06-29 DIAGNOSIS — I10 ESSENTIAL HYPERTENSION: ICD-10-CM

## 2020-06-29 DIAGNOSIS — I49.5 SSS (SICK SINUS SYNDROME) (HCC): Primary | ICD-10-CM

## 2020-06-29 DIAGNOSIS — R55 SYNCOPE, UNSPECIFIED SYNCOPE TYPE: ICD-10-CM

## 2020-06-29 DIAGNOSIS — I47.1 PSVT (PAROXYSMAL SUPRAVENTRICULAR TACHYCARDIA) (HCC): ICD-10-CM

## 2020-06-29 DIAGNOSIS — Z95.0 CARDIAC PACEMAKER IN SITU: ICD-10-CM

## 2020-06-29 NOTE — PROGRESS NOTES
Subjective:      Sherri Barajas is a 62 y.o. male is here for EP follow up. The patient denies chest pain/ shortness of breath, orthopnea, PND, LE edema, palpitations, syncope, presyncope or fatigue. Patient Active Problem List    Diagnosis Date Noted    SSS (sick sinus syndrome) ([facility] 12/26/2018    S/P cardiac cath 12/06/2018    Acquired hypothyroidism 06/15/2016    ASHD (arteriosclerotic heart disease) 06/12/2015    Essential hypertension 04/28/2015    Insomnia 12/29/2014    COPD (chronic obstructive pulmonary disease) ([facility]     Coronary atherosclerosis of native coronary artery 02/22/2013    Exercise induced bronchospasm 02/22/2013    Old myocardial infarction 02/22/2013    S/P coronary artery stent placement 05/08/2012    Dyslipidemia 05/08/2012    STEMI (ST elevation myocardial infarction) ([facility] 05/05/2012    Tobacco use 05/05/2012      Sylwia Rodarte DO  Past Medical History:   Diagnosis Date    Acute myocardial infarction, unspecified site, episode of care unspecified 2/22/2013    CAD (coronary artery disease)     Chest pain 10/15/2014    COPD [facility]ch[facility] obstructive pulmonary disease) ([facility]     Coronary atherosclerosis of native coronary artery 2/22/2013    Exercise induced bronchospasm 2/22/2013    Hypertension     Hyperthyroidism     Old myocardial infarction 2/22/2013    Tobacco use 5/5/2012      Past Surgical History:   Procedure Laterality Date    CARDIAC SURG PROCEDURE UNLIST      stents may 2012     HX CATARACT REMOVAL Left 02/02/2016    HX OTHER SURGICAL  12/26/2018    Pacemaker    HX PACEMAKER  12/26/2018     Allergies   Allergen Reactions    Bupropion Other (comments)     Urinary frequency and flatulence.     Chantix [Varenicline] Other (comments)     Trouble sleeping, soreness of mouth    Codeine Itching    Fluticasone Shortness of Breath and Swelling     possibly due to    Melatonin Myalgia    Nasonex [Mometasone] Other (comments) swelling of throat and hands possibly due to      Family History   Problem Relation Age of Onset   [place] Cancer Mother         lung cancer    Heart Disease Father     Thyroid Disease Brother     Thyroid Disease Maternal Aunt     negative for cardiac disease  Social History     Socioeconomic History    Marital status:      Spouse name: Not on file    Number of children: Not on file    Years of education: Not on file    Highest education level: Not on file   Tobacco Use    Smoking status: Former Smoker     Packs/day: 1.00     Years: 38.00     Pack years: 38.00     Types: Cigarettes     Last attempt to quit: 2020     Years since quittin.0    Smokeless tobacco: Never Used   Substance and Sexual Activity    Alcohol use: No     Alcohol/week: 0.0 standard drinks    Drug use: Yes     Types: Marijuana     Comment: sevevral weeks ago    Sexual activity: Not Currently     Current Outpatient Medications   Medication Sig    Trelegy Ellipta 100-62.5-25 mcg inhaler INHALE ONE PUFF BY MOUTH DAILY    pravastatin (PRAVACHOL) 40 mg tablet TAKE ONE TABLET BY MOUTH DAILY    hydroCHLOROthiazide (HYDRODIURIL) 12.5 mg tablet TAKE TWO TABLETS BY MOUTH DAILY    ALPRAZolam (Xanax) 0.5 mg tablet Take 1 Tab by mouth every eight (8) hours as needed for Anxiety. Max Daily Amount: 1.5 mg.    losartan (COZAAR) 50 mg tablet TAKE TWO TABLETS BY MOUTH DAILY    levothyroxine (SYNTHROID) 100 mcg tablet TAKE ONE TABLET BY MOUTH DAILY BEFORE BREAKFAST    carvedilol (COREG) 6.25 mg tablet TAKE ONE TABLET BY MOUTH TWICE A DAY    albuterol (PROAIR HFA) 90 mcg/actuation inhaler Take 2 Puffs by inhalation every six (6) hours as needed for Wheezing.  acetaminophen (TYLENOL EXTRA STRENGTH) 500 mg tablet Take  by mouth every six (6) hours as needed for Pain.  nitroglycerin (NITROSTAT) 0.4 mg SL tablet 1 Tab by SubLINGual route every five (5) minutes as needed for Chest Pain.     inhalational spacing device 1 Each by Does Not Apply route as needed.  aspirin delayed-release 81 mg tablet Take 1 Tab by mouth daily.  busPIRone (BUSPAR) 5 mg tablet Take 1 Tab by mouth three (3) times daily (with meals).  predniSONE (DELTASONE) 20 mg tablet 60 mg daily x 6 days, then stop. No current facility-administered medications for this visit. Vitals:    06/29/20 1046   BP: 96/62   Pulse: 88   Resp: 18   SpO2: 94%   Weight: 159 lb 6.4 oz (72.3 kg)   Height: 5' 7\" (1.702 m)       I have reviewed the nurses notes, vitals, problem list, allergy list, medical history, family, social history and medications. Review of Symptoms:    General: Pt denies excessive weight gain or loss. Pt is able to conduct ADL's  HEENT: Denies blurred vision, headaches, epistaxis and difficulty swallowing. Respiratory: Denies shortness of breath, ANGEL, wheezing or stridor. Cardiovascular: Denies precordial pain, palpitations, edema or PND  Gastrointestinal: Denies poor appetite, indigestion, abdominal pain or blood in stool  Urinary: Denies dysuria, pyuria  Musculoskeletal: Denies pain or swelling from muscles or joints  Neurologic: Denies tremor, paresthesias, or sensory motor disturbance  Skin: Denies rash, itching or texture change. Psych: Denies depression      Physical Exam:      General: Well developed, in no acute distress. HEENT: Eyes - PERRL, no jvd  Heart:  Normal S1/S2 negative S3 or S4. Regular, no murmur, gallop or rub. Respiratory: Clear bilaterally x 4, no wheezing or rales  Extremities:  No edema, normal cap refill, no cyanosis. Musculoskeletal: No clubbing  Neuro: A&Ox3, speech clear, gait stable. Skin: Skin color is normal. No rashes or lesions. Non diaphoretic  Vascular: 2+ pulses symmetric in all extremities    Cardiographics    EKG: sinus rhythm.      Results for orders placed or performed in visit on 11/26/18   CARDIAC HOLTER MONITOR, 24 HOURS    Narrative    ECG Monitor/24 hours, Complete    Reason for Holter Monitor SYNCOPE    Heartbeat    Slowest 43  Average 60  Fastest  94        Results:   Underlying Rhythm: Normal sinus rhythm      Atrial Arrhythmias: severe bradycardia, PSVT         AV Conduction: normal    Ventricular Arrhythmias: premature ventricular contractions; occasional     ST Segment Analysis:normal     Symptom Correlation:  None reported    Comment:   Sinus rhythm with PSVT and episodes of profound bradycardia to 40 bpm. Clinical correlation advised. Lizette Tate MD, Nuris Grubbs      Results for orders placed or performed during the hospital encounter of 03/19/17   EKG, 12 LEAD, INITIAL   Result Value Ref Range    Ventricular Rate 82 BPM    Atrial Rate 82 BPM    P-R Interval 136 ms    QRS Duration 94 ms    Q-T Interval 384 ms    QTC Calculation (Bezet) 448 ms    Calculated P Axis 61 degrees    Calculated R Axis 76 degrees    Calculated T Axis 63 degrees    Diagnosis       Normal sinus rhythm  Normal ECG  Confirmed by Jason Herring ([id number]) on 3/20/2017 8:10:32 AM           Lab Results   Component Value Date/Time    WBC 6.1 10/10/2019 10:52 AM    HGB 14.6 10/10/2019 10:52 AM    HCT 43.1 10/10/2019 10:52 AM    PLATELET 897 66/24/3112 10:52 AM    MCV 94 10/10/2019 10:52 AM      Lab Results   Component Value Date/Time    Sodium 135 10/10/2019 10:52 AM    Potassium 3.9 10/10/2019 10:52 AM    Chloride 94 (L) 10/10/2019 10:52 AM    CO2 25 10/10/2019 10:52 AM    Anion gap 7 03/19/2017 09:02 PM    Glucose 135 (H) 10/10/2019 10:52 AM    BUN 16 10/10/2019 10:52 AM    Creatinine 0.87 10/10/2019 10:52 AM    BUN/Creatinine ratio 18 10/10/2019 10:52 AM    GFR est  10/10/2019 10:52 AM    GFR est non-AA 92 10/10/2019 10:52 AM    Calcium 9.5 10/10/2019 10:52 AM    Bilirubin, total 0.2 10/10/2019 10:52 AM    Alk.  phosphatase 87 10/10/2019 10:52 AM    Protein, total 6.7 10/10/2019 10:52 AM    Albumin 4.0 10/10/2019 10:52 AM    Globulin 3.8 03/19/2017 09:02 PM    A-G Ratio 1.5 10/10/2019 10:52 AM    ALT (SGPT) 38 10/10/2019 10:52 AM      Lab Results   Component Value Date/Time    TSH 1.370 10/10/2019 10:52 AM    TSH 0.855 04/10/2019 10:49 AM        Assessment:           ICD-10-CM ICD-9-CM    1. SSS (sick sinus syndrome) ([facility]) I49.5 427.81    2. Syncope, unspecified syncope type R55 780.2    3. PSVT (paroxysmal supraventricular tachycardia) ([facility]) I47.1 427.0 AMB POC EKG ROUTINE W/ 12 LEADS, INTER & REP   4. Essential hypertension I10 401.9    5. Cardiac pacemaker in situ Z95.0 V45.01      Orders Placed This Encounter    AMB POC EKG ROUTINE W/ 12 LEADS, INTER & REP     Order Specific Question:   Reason for Exam:     Answer:   routine        Plan:     Mr Alonzo Peña is here for annual follow up and device check (s/p dual chamber PM). He is 36% AP and  <0.1% RVP. One asymptomatic episodes of  AT, longest 7 sec. Device with 8 years remaining. Enrolled in remote monitoring of his device. He remains in sinus and normotensive. We will see him back in 1 year.      Continue medical management for COPD, ASHD. Thank you for allowing me to participate in Beverley Fonseca 's care.       Dennis Madrigal NP

## 2020-06-29 NOTE — PROGRESS NOTES
Have you been to the ER, urgent care clinic since your last visit? Hospitalized since your last visit? No    2. Have you seen or consulted any other health care providers outside of the [address] [place] [address] since your last visit? Include any pap smears or colon screening. No    Chief Complaint   Patient presents with    Pacemaker Check     Yearly appt. Denied cardiac symptoms.

## 2020-07-01 NOTE — PROGRESS NOTES
1. Have you been to the ER, urgent care clinic since your last visit? Hospitalized since your last visit? ED [facility] 4- insomnia. 2. Have you seen or consulted any other health care providers outside of the [address] [place] [address] since your last visit? Include any pap smears or colon screening.  No

## 2020-07-02 NOTE — PROGRESS NOTES
Subjective/HPI:     Alexander Ahn is a 62 y.o. male is here for a f/u appt. He denies CP, SOB/ANGEL, orthopnea, PND, edema, dizziness, lightheadedness, or palpitations. He quit smoking tobacco a month ago, marijuana in April. Feels \"great\". Trying to watch his diet, not eat junk. He is eating a fair amount at a time though. PCP Provider  Ramin Cabral DO  Past Medical History:   Diagnosis Date    Acute myocardial infarction, unspecified site, episode of care unspecified 2/22/2013    CAD (coronary artery disease)     Chest pain 10/15/2014    [facility]     Coronary atherosclerosis of native coronary artery 2/22/2013    Exercise induced bronchospasm 2/22/2013    Hypertension     Hyperthyroidism     Old myocardial infarction 2/22/2013    Tobacco use 5/5/2012      Past Surgical History:   Procedure Laterality Date    CARDIAC SURG PROCEDURE UNLIST      stents may 2012     HX CATARACT REMOVAL Left 02/02/2016    HX OTHER SURGICAL  12/26/2018    Pacemaker    HX PACEMAKER  12/26/2018     Allergies   Allergen Reactions    Bupropion Other (comments)     Urinary frequency and flatulence.     Chantix [Varenicline] Other (comments)     Trouble sleeping, soreness of mouth    Codeine Itching    Fluticasone Shortness of Breath and Swelling     possibly due to    Melatonin Myalgia    Nasonex [Mometasone] Other (comments)     swelling of throat and hands possibly due to      Family History   Problem Relation Age of Onset    Cancer Mother         lung cancer    Heart Disease Father     Thyroid Disease Brother     Thyroid Disease Maternal Aunt       Current Outpatient Medications   Medication Sig    Trelegy Ellipta 100-62.5-25 mcg inhaler INHALE ONE PUFF BY MOUTH DAILY    pravastatin (PRAVACHOL) 40 mg tablet TAKE ONE TABLET BY MOUTH DAILY    hydroCHLOROthiazide (HYDRODIURIL) 12.5 mg tablet TAKE TWO TABLETS BY MOUTH DAILY    ALPRAZolam (Xanax) 0.5 mg tablet Take 1 Tab by mouth every eight (8) hours as needed for Anxiety. Max Daily Amount: 1.5 mg.    losartan (COZAAR) 50 mg tablet TAKE TWO TABLETS BY MOUTH DAILY    levothyroxine (SYNTHROID) 100 mcg tablet TAKE ONE TABLET BY MOUTH DAILY BEFORE BREAKFAST    carvedilol (COREG) 6.25 mg tablet TAKE ONE TABLET BY MOUTH TWICE A DAY    albuterol (PROAIR HFA) 90 mcg/actuation inhaler Take 2 Puffs by inhalation every six (6) hours as needed for Wheezing.  acetaminophen (TYLENOL EXTRA STRENGTH) 500 mg tablet Take  by mouth every six (6) hours as needed for Pain.  nitroglycerin (NITROSTAT) 0.4 mg SL tablet 1 Tab by SubLINGual route every five (5) minutes as needed for Chest Pain.  inhalational spacing device 1 Each by Does Not Apply route as needed.  aspirin delayed-release 81 mg tablet Take 1 Tab by mouth daily.  busPIRone (BUSPAR) 5 mg tablet Take 1 Tab by mouth three (3) times daily (with meals).  predniSONE (DELTASONE) 20 mg tablet 60 mg daily x 6 days, then stop. No current facility-administered medications for this visit. There were no vitals filed for this visit.   Social History     Socioeconomic History    Marital status:      Spouse name: Not on file    Number of children: Not on file    Years of education: Not on file    Highest education level: Not on file   Occupational History    Not on file   Social Needs    Financial resource strain: Not on file    Food insecurity     Worry: Not on file     Inability: Not on file    Transportation needs     Medical: Not on file     Non-medical: Not on file   Tobacco Use    Smoking status: Former Smoker     Packs/day: 1.00     Years: 38.00     Pack years: 38.00     Types: Cigarettes     Last attempt to quit: 2020     Years since quittin.0    Smokeless tobacco: Never Used   Substance and Sexual Activity    Alcohol use: No     Alcohol/week: 0.0 standard drinks    Drug use: Yes     Types: Marijuana Comment: sevevral weeks ago    Sexual activity: Not Currently   Lifestyle    Physical activity     Days per week: Not on file     Minutes per session: Not on file    Stress: Not on file   Relationships    Social connections     Talks on phone: Not on file     Gets together: Not on file     Attends Hindu service: Not on file     Active member of club or organization: Not on file     Attends meetings of clubs or organizations: Not on file     Relationship status: Not on file    Intimate partner violence     Fear of current or ex partner: Not on file     Emotionally abused: Not on file     Physically abused: Not on file     Forced sexual activity: Not on file   Other Topics Concern    Not on file   Social History Narrative    Not on file       I have reviewed the nurses notes, vitals, problem list, allergy list, medical history, family, social history and medications. Review of Symptoms:    General: Pt denies excessive weight gain or loss. Pt is able to conduct ADL's  HEENT: Denies blurred vision, headaches, epistaxis and difficulty swallowing. Respiratory: Denies shortness of breath, ANGEL, wheezing or stridor. Cardiovascular: Denies precordial pain, palpitations, edema or PND  Gastrointestinal: Denies poor appetite, indigestion, abdominal pain or blood in stool  Musculoskeletal: Denies pain or swelling from muscles or joints  Neurologic: denies Syncope  Skin: Denies rash, itching or texture change. Physical Exam:      General: Well developed, in no acute distress, cooperative and alert  HEENT: No carotid bruits, no JVD, trach is midline. Neck Supple, PEERL, EOM intact. Heart:  Normal S1/S2 negative S3 or S4. Regular, no murmur, gallop or rub.   Respiratory: Bilateral expiratory wheezing  Abdomen:   Soft, non-tender, no masses, bowel sounds are active.   Extremities:  No edema, normal cap refill, no cyanosis, atraumatic. Neuro: A&Ox3, speech clear, gait stable.    Skin: Skin color is normal. No rashes or lesions. Non diaphoretic  Vascular: 2+ pulses symmetric in all extremities    Cardiographics    6/29/20 ECG: SR      Results for orders placed or performed in visit on 11/26/18   CARDIAC HOLTER MONITOR, 24 HOURS    Narrative    ECG Monitor/24 hours, Complete    Reason for Holter Monitor  SYNCOPE    Heartbeat    Slowest 43  Average 60  Fastest  94        Results:   Underlying Rhythm: Normal sinus rhythm      Atrial Arrhythmias: severe bradycardia, PSVT         AV Conduction: normal    Ventricular Arrhythmias: premature ventricular contractions; occasional     ST Segment Analysis:normal     Symptom Correlation:  None reported    Comment:   Sinus rhythm with PSVT and episodes of profound bradycardia to 40 bpm. Clinical correlation advised.      Makenna Mcdonald MD, Aimee Jackson      Results for orders placed or performed during the hospital encounter of 03/19/17   EKG, 12 LEAD, INITIAL   Result Value Ref Range    Ventricular Rate 82 BPM    Atrial Rate 82 BPM    P-R Interval 136 ms    QRS Duration 94 ms    Q-T Interval 384 ms    QTC Calculation (Bezet) 448 ms    Calculated P Axis 61 degrees    Calculated R Axis 76 degrees    Calculated T Axis 63 degrees    Diagnosis       Normal sinus rhythm  Normal ECG  Confirmed by Aarti Melendrez ([id number]) on 3/20/2017 8:10:32 AM           Cardiology Labs:  Lab Results   Component Value Date/Time    Cholesterol, total 157 10/10/2019 10:52 AM    HDL Cholesterol 37 (L) 10/10/2019 10:52 AM    LDL, calculated 92 10/10/2019 10:52 AM    Triglyceride 142 10/10/2019 10:52 AM    CHOL/HDL Ratio 2.7 06/02/2015 04:16 AM       Lab Results   Component Value Date/Time    Sodium 135 10/10/2019 10:52 AM    Potassium 3.9 10/10/2019 10:52 AM    Chloride 94 (L) 10/10/2019 10:52 AM    CO2 25 10/10/2019 10:52 AM    Anion gap 7 03/19/2017 09:02 PM    Glucose 135 (H) 10/10/2019 10:52 AM    BUN 16 10/10/2019 10:52 AM    Creatinine 0.87 10/10/2019 10:52 AM    BUN/Creatinine ratio 18 10/10/2019 10:52 AM GFR est  10/10/2019 10:52 AM    GFR est non-AA 92 10/10/2019 10:52 AM    Calcium 9.5 10/10/2019 10:52 AM    Bilirubin, total 0.2 10/10/2019 10:52 AM    Alk. phosphatase 87 10/10/2019 10:52 AM    Protein, total 6.7 10/10/2019 10:52 AM    Albumin 4.0 10/10/2019 10:52 AM    Globulin 3.8 03/19/2017 09:02 PM    A-G Ratio 1.5 10/10/2019 10:52 AM    ALT (SGPT) 38 10/10/2019 10:52 AM           Assessment:     Assessment:     Diagnoses and all orders for this visit:    1. Coronary artery disease of native artery of native heart with stable angina pectoris ([facility]    2. Essential hypertension    3. Dyslipidemia    4. Pacemaker    5. Old myocardial infarction    6. S/P coronary artery stent placement        ICD-10-CM ICD-9-CM    1. Coronary artery disease of native artery of native heart with stable angina pectoris ([facility] I25.118 414.01      413.9    2. Essential hypertension I10 401.9    3. Dyslipidemia E78.5 272.4    4. Pacemaker Z95.0 V45.01    5. Old myocardial infarction I25.2 412    6. S/P coronary artery stent placement Z95.5 V45.82      No orders of the defined types were placed in this encounter. Plan:     Patient is a 73-year-old male with a history of atherosclerotic heart disease presents today for a f/u appt. He is doing well. Congratulated on his smoking cessation. EKG on 6/29 was NSR. BP at goal. LDL has been trending in the 90s for the past 3 years. Goal LDL below 70. Changing to Atorvastatin 40mg every day, repeat labs in 3 months. F/U in 6 months with Dr Vibha Dillard.        Karime Milian NP

## 2020-07-06 ENCOUNTER — OFFICE VISIT (OUTPATIENT)
Dept: CARDIOLOGY CLINIC | Age: 65
End: 2020-07-06

## 2020-07-06 VITALS
OXYGEN SATURATION: 95 % | DIASTOLIC BLOOD PRESSURE: 60 MMHG | HEART RATE: 80 BPM | SYSTOLIC BLOOD PRESSURE: 110 MMHG | HEIGHT: 67 IN | WEIGHT: 158.8 LBS | RESPIRATION RATE: 16 BRPM | BODY MASS INDEX: 24.92 KG/M2

## 2020-07-06 DIAGNOSIS — Z95.5 S/P CORONARY ARTERY STENT PLACEMENT: ICD-10-CM

## 2020-07-06 DIAGNOSIS — I25.2 OLD MYOCARDIAL INFARCTION: ICD-10-CM

## 2020-07-06 DIAGNOSIS — I10 ESSENTIAL HYPERTENSION: ICD-10-CM

## 2020-07-06 DIAGNOSIS — I25.118 CORONARY ARTERY DISEASE OF NATIVE ARTERY OF NATIVE HEART WITH STABLE ANGINA PECTORIS (HCC): Primary | ICD-10-CM

## 2020-07-06 DIAGNOSIS — Z95.0 PACEMAKER: ICD-10-CM

## 2020-07-06 DIAGNOSIS — E78.5 DYSLIPIDEMIA: ICD-10-CM

## 2020-07-06 RX ORDER — ATORVASTATIN CALCIUM 40 MG/1
40 TABLET, FILM COATED ORAL DAILY
Qty: 30 TAB | Refills: 5 | Status: SHIPPED | OUTPATIENT
Start: 2020-07-06 | End: 2020-12-29

## 2020-08-11 ENCOUNTER — OFFICE VISIT (OUTPATIENT)
Dept: INTERNAL MEDICINE CLINIC | Age: 65
End: 2020-08-11
Payer: MEDICAID

## 2020-08-11 VITALS
TEMPERATURE: 97.5 F | DIASTOLIC BLOOD PRESSURE: 71 MMHG | HEIGHT: 67 IN | OXYGEN SATURATION: 94 % | SYSTOLIC BLOOD PRESSURE: 106 MMHG | WEIGHT: 164.4 LBS | RESPIRATION RATE: 18 BRPM | BODY MASS INDEX: 25.8 KG/M2 | HEART RATE: 69 BPM

## 2020-08-11 DIAGNOSIS — I25.118 CORONARY ARTERY DISEASE OF NATIVE ARTERY OF NATIVE HEART WITH STABLE ANGINA PECTORIS (HCC): Primary | ICD-10-CM

## 2020-08-11 DIAGNOSIS — Z23 ENCOUNTER FOR IMMUNIZATION: ICD-10-CM

## 2020-08-11 DIAGNOSIS — E03.9 ACQUIRED HYPOTHYROIDISM: ICD-10-CM

## 2020-08-11 DIAGNOSIS — F41.0 PANIC ATTACKS: ICD-10-CM

## 2020-08-11 DIAGNOSIS — J44.9 CHRONIC OBSTRUCTIVE PULMONARY DISEASE, UNSPECIFIED COPD TYPE (HCC): ICD-10-CM

## 2020-08-11 DIAGNOSIS — I10 ESSENTIAL HYPERTENSION: ICD-10-CM

## 2020-08-11 DIAGNOSIS — F41.9 ANXIETY: ICD-10-CM

## 2020-08-11 PROCEDURE — 99213 OFFICE O/P EST LOW 20 MIN: CPT | Performed by: INTERNAL MEDICINE

## 2020-08-11 NOTE — PROGRESS NOTES
Vania Gunderson is a 62 y.o. male who presents for evaluation of routine follow up. Last seen by me April 10, 2020 in virtual visit, when he was struggling with severe anxiety from covid 19. Tried buspar, but that made things much worse. He went to ED, and was given xanax, which worked. He has since stopped smoking MJ and cigarettes, and is doing much better. He has not needed any xanax in over a month as well. ROS:  Constitutional: negative for fevers, chills, anorexia and weight loss  Eyes:   negative for visual disturbance and irritation  ENT:   negative for tinnitus,sore throat,nasal congestion,ear pain,hoarseness  Respiratory:  negative for cough, hemoptysis, dyspnea,wheezing  CV:   negative for chest pain, palpitations, lower extremity edema  GI:   negative for nausea, vomiting, diarrhea, abdominal pain,melena  Genitourinary: negative for frequency, dysuria and hematuria  Musculoskel: negative for myalgias, arthralgias, back pain, muscle weakness, joint pain  Neurological:  negative for headaches, dizziness, focal weakness, numbness  Psychiatric:     Negative for depression or anxiety--resolved.       Past Medical History:   Diagnosis Date    Acute myocardial infarction, unspecified site, episode of care unspecified 2/22/2013    CAD (coronary artery disease)     Chest pain 10/15/2014    COPD (chronic obstructive pulmonary disease) ([facility]     Coronary atherosclerosis of native coronary artery 2/22/2013    Exercise induced bronchospasm 2/22/2013    Hypertension     Hyperthyroidism     Old myocardial infarction 2/22/2013    Tobacco use 5/5/2012       Past Surgical History:   Procedure Laterality Date    CARDIAC SURG PROCEDURE UNLIST      stents may 2012     HX CATARACT REMOVAL Left 02/02/2016    HX OTHER SURGICAL  12/26/2018    Pacemaker    HX PACEMAKER  12/26/2018       Family History   Problem Relation Age of Onset    Cancer Mother         lung cancer    Heart Disease Father    Jonna Urbina Thyroid Disease Brother     Thyroid Disease Maternal Aunt        Social History     Socioeconomic History    Marital status:      Spouse name: Not on file    Number of children: Not on file    Years of education: Not on file    Highest education level: Not on file   Occupational History    Not on file   Social Needs    Financial resource strain: Not on file    Food insecurity     Worry: Not on file     Inability: Not on file    Transportation needs     Medical: Not on file     Non-medical: Not on file   Tobacco Use    Smoking status: Former Smoker     Packs/day: 1.00     Years: 38.00     Pack years: 38.00     Types: Cigarettes     Last attempt to quit: 2020     Years since quittin.2    Smokeless tobacco: Never Used   Substance and Sexual Activity    Alcohol use: No     Alcohol/week: 0.0 standard drinks    Drug use: Yes     Types: Marijuana     Comment: meo weeks ago    Sexual activity: Not Currently   Lifestyle    Physical activity     Days per week: Not on file     Minutes per session: Not on file    Stress: Not on file   Relationships    Social connections     Talks on phone: Not on file     Gets together: Not on file     Attends Orthodoxy service: Not on file     Active member of club or organization: Not on file     Attends meetings of clubs or organizations: Not on file     Relationship status: Not on file    Intimate partner violence     Fear of current or ex partner: Not on file     Emotionally abused: Not on file     Physically abused: Not on file     Forced sexual activity: Not on file   Other Topics Concern    Not on file   Social History Narrative    Not on file            Visit Vitals  /71 (BP 1 Location: Left arm, BP Patient Position: Sitting)   Pulse 69   Temp 97.5 °F (36.4 °C) (Temporal)   Resp 18   Ht 5' 7\" (1.702 m)   Wt 164 lb 6.4 oz (74.6 kg)   SpO2 94% Comment: RA   BMI 25.75 kg/m²       Physical Examination:   General - Well appearing male  HEENT - PERRL, TM no erythema/opacification, normal nasal turbinates, no oropharyngeal erythema or exudate, MMM  Neck - supple, no bruits, no thyroidomegaly, no lymphadenopathy  Pulm - clear to auscultation bilaterally  Cardio - RRR, normal S1 S2, no murmur  Abd - soft, nontender, no masses, no HSM  Extrem - no edema, +2 distal pulses  Neuro-  No focal deficits, CN intact     Assessment/Plan:    1. Copd--doing well with trelegy  2. Tobacco abuse--quit may 2020  3. Cad with stents--on asa, coreg  4.  htn--continue hctz, coreg, cozaar  5.  predm--last a1c 5.8  6. Hypothyroid--doing well, back on full dose synthroid  7.   MONIE, situational--resolved, off buspar and off xanax    rtc 6 months        Franko Lipoma III, DO

## 2020-08-11 NOTE — PATIENT INSTRUCTIONS

## 2020-09-28 ENCOUNTER — OFFICE VISIT (OUTPATIENT)
Dept: CARDIOLOGY CLINIC | Age: 65
End: 2020-09-28
Payer: MEDICAID

## 2020-09-28 DIAGNOSIS — I49.5 SSS (SICK SINUS SYNDROME) (HCC): ICD-10-CM

## 2020-09-28 DIAGNOSIS — Z95.0 CARDIAC PACEMAKER IN SITU: Primary | ICD-10-CM

## 2020-09-28 PROCEDURE — 93294 REM INTERROG EVL PM/LDLS PM: CPT | Performed by: INTERNAL MEDICINE

## 2020-09-28 PROCEDURE — 93296 REM INTERROG EVL PM/IDS: CPT | Performed by: INTERNAL MEDICINE

## 2020-10-06 DIAGNOSIS — I25.118 CORONARY ARTERY DISEASE OF NATIVE ARTERY OF NATIVE HEART WITH STABLE ANGINA PECTORIS (HCC): ICD-10-CM

## 2020-10-06 DIAGNOSIS — E78.5 DYSLIPIDEMIA: ICD-10-CM

## 2020-10-06 DIAGNOSIS — I10 ESSENTIAL HYPERTENSION: ICD-10-CM

## 2020-10-13 ENCOUNTER — TELEPHONE (OUTPATIENT)
Dept: CARDIOLOGY CLINIC | Age: 65
End: 2020-10-13

## 2020-10-13 DIAGNOSIS — I25.10 ASHD (ARTERIOSCLEROTIC HEART DISEASE): ICD-10-CM

## 2020-10-13 DIAGNOSIS — N28.9 ABNORMAL KIDNEY FUNCTION: Primary | ICD-10-CM

## 2020-10-13 LAB
ALBUMIN SERPL-MCNC: 4.3 G/DL (ref 3.8–4.8)
ALBUMIN/GLOB SERPL: 1.5 {RATIO} (ref 1.2–2.2)
ALP SERPL-CCNC: 101 IU/L (ref 39–117)
ALT SERPL-CCNC: 22 IU/L (ref 0–44)
AST SERPL-CCNC: 23 IU/L (ref 0–40)
BILIRUB SERPL-MCNC: 0.3 MG/DL (ref 0–1.2)
BUN SERPL-MCNC: 25 MG/DL (ref 8–27)
BUN/CREAT SERPL: 18 (ref 10–24)
CALCIUM SERPL-MCNC: 9.2 MG/DL (ref 8.6–10.2)
CHLORIDE SERPL-SCNC: 100 MMOL/L (ref 96–106)
CHOLEST SERPL-MCNC: 122 MG/DL (ref 100–199)
CK SERPL-CCNC: 70 U/L (ref 41–331)
CO2 SERPL-SCNC: 28 MMOL/L (ref 20–29)
CREAT SERPL-MCNC: 1.36 MG/DL (ref 0.76–1.27)
GLOBULIN SER CALC-MCNC: 2.9 G/DL (ref 1.5–4.5)
GLUCOSE SERPL-MCNC: 91 MG/DL (ref 65–99)
HDLC SERPL-MCNC: 40 MG/DL
INTERPRETATION, 910389: NORMAL
INTERPRETATION: NORMAL
LDLC SERPL CALC-MCNC: 66 MG/DL (ref 0–99)
PDF IMAGE, 910387: NORMAL
POTASSIUM SERPL-SCNC: 5.1 MMOL/L (ref 3.5–5.2)
PROT SERPL-MCNC: 7.2 G/DL (ref 6–8.5)
SODIUM SERPL-SCNC: 139 MMOL/L (ref 134–144)
TRIGL SERPL-MCNC: 83 MG/DL (ref 0–149)
VLDLC SERPL CALC-MCNC: 16 MG/DL (ref 5–40)

## 2020-10-13 NOTE — TELEPHONE ENCOUNTER
----- Message from Carmencita Carpio NP sent at 10/13/2020  8:59 AM EDT -----  Please let pt know that his kidney function is getting strained. I would like him to start taking HCTZ 1 tab daily, 12.5mg. Lets repeat labs in a month, BMP. If he can monitor his BP at home and let us know if his SBP is over 140 we can adjust medications accordingly.

## 2020-10-13 NOTE — TELEPHONE ENCOUNTER
----- Message from Richie Laws NP sent at 10/13/2020  9:00 AM EDT -----  Cholesterol is much better, now at goal. LDL 66, HDL 40, trig 83.

## 2020-10-13 NOTE — TELEPHONE ENCOUNTER
Spoke with patient. Verified patient with two patient identifiers. Advised kidney function a bit strained. Start HCTZ 12.5 mg every day. Repeat BMP in one month. Mailed lab slip to pt. Keep BP diary, has machine at home. Call if  or above on a regular basis so we can adjust meds. Patient verbalized understanding.

## 2020-10-13 NOTE — TELEPHONE ENCOUNTER
Spoke with patient. Verified patient with two patient identifiers. Advised lipids improved. Patient verbalized understanding.

## 2020-10-13 NOTE — PROGRESS NOTES
Please let pt know that his kidney function is getting strained. I would like him to start taking HCTZ 1 tab daily, 12.5mg. Lets repeat labs in a month, BMP. If he can monitor his BP at home and let us know if his SBP is over 140 we can adjust medications accordingly.

## 2020-10-28 ENCOUNTER — TELEPHONE (OUTPATIENT)
Dept: INTERNAL MEDICINE CLINIC | Age: 65
End: 2020-10-28

## 2020-10-28 NOTE — TELEPHONE ENCOUNTER
----- Message from Lambert Temple sent at 10/28/2020 11:09 AM EDT -----  Regarding: Dr. Myriam Patterson telephone  General Message/Vendor Calls    Caller's first and last name: pt       Reason for call: pt states that he was told by his pharmacy that his insurance company had to call his doctor's office for a prior authorization for his Trelegy medication. Pt states that he called his insurance company and was informed that they would send over the request. However, pt states his pharmacy did not receive a request for the prescription from his doctor's as of yet. Pt is following up on the status of his refill.        Callback required yes/no and why: yes      Best contact number(s): [phone]      Message from [facility]

## 2020-11-13 DIAGNOSIS — I25.10 ASHD (ARTERIOSCLEROTIC HEART DISEASE): ICD-10-CM

## 2020-11-13 DIAGNOSIS — N28.9 ABNORMAL KIDNEY FUNCTION: ICD-10-CM

## 2020-11-14 LAB
BUN SERPL-MCNC: 17 MG/DL (ref 8–27)
BUN/CREAT SERPL: 13 (ref 10–24)
CALCIUM SERPL-MCNC: 9.6 MG/DL (ref 8.6–10.2)
CHLORIDE SERPL-SCNC: 95 MMOL/L (ref 96–106)
CO2 SERPL-SCNC: 29 MMOL/L (ref 20–29)
CREAT SERPL-MCNC: 1.32 MG/DL (ref 0.76–1.27)
GLUCOSE SERPL-MCNC: 90 MG/DL (ref 65–99)
INTERPRETATION: NORMAL
POTASSIUM SERPL-SCNC: 4.7 MMOL/L (ref 3.5–5.2)
SODIUM SERPL-SCNC: 138 MMOL/L (ref 134–144)

## 2020-11-16 ENCOUNTER — TELEPHONE (OUTPATIENT)
Dept: CARDIOLOGY CLINIC | Age: 65
End: 2020-11-16

## 2020-11-16 NOTE — TELEPHONE ENCOUNTER
Spoke with patient. Verified patient with two patient identifiers. Does not know what his BP is running, cannot get his machine working. Is taking HCTZ 12.5 mg, two tabs or 25 mg every day. States he was asleep when we woke him up in Oct 2020 to advise to take only one tab, and he was confused, so he just went ahead and took two tabs. .    Please advise.

## 2020-11-16 NOTE — TELEPHONE ENCOUNTER
----- Message from Urvashi Cleveland NP sent at 11/16/2020 10:41 AM EST -----  Kidney function remains a bit strained. How have BPs been? How much HCTZ is he taking?

## 2020-11-16 NOTE — TELEPHONE ENCOUNTER
Spoke with patient. Verified patient with two patient identifiers. States he got his BP machine working, /77. Advised to decrease HCTZ 12.5 mg to one tab every day only due to ^ kidney function. Call us in one month with BP readings, to keep diary. Call if BP >140/90. Advised next time if he forgets what to do, call us to reiterate instructions. Patient verbalized understanding.

## 2020-12-04 RX ORDER — CARVEDILOL 6.25 MG/1
TABLET ORAL
Qty: 60 TAB | Refills: 10 | Status: SHIPPED | OUTPATIENT
Start: 2020-12-04 | End: 2021-10-26

## 2020-12-15 ENCOUNTER — TELEPHONE (OUTPATIENT)
Dept: CARDIOLOGY CLINIC | Age: 65
End: 2020-12-15

## 2020-12-15 NOTE — TELEPHONE ENCOUNTER
Spoke with patient. Verified patient with two patient identifiers. States he is on HCTZ 12.5 mg once a day only. BP running 114//70 range. Denied symptoms. Had one reading 92/56, states he was probably dehydrate. Advised to make sure he drinks enough water every day and stay hydrated. ? Continue the one pill every day? Pls advise.

## 2020-12-15 NOTE — TELEPHONE ENCOUNTER
Please call patient he is doing follow up call regarding his blood pressure.           [phone]    Thanks  Gisela Castillo

## 2020-12-19 NOTE — TELEPHONE ENCOUNTER
Spoke with patient. Verified patient with two patient identifiers. Advised to continue HCTZ 12.5 mg once a day only. Stay hydrated. Patient verbalized understanding.       Wilbur Mckeon NP sent to Teresa Velez LPN   Caller: Unspecified (4 days ago, 11:03 AM)             Yes, HCTZ should only be 1 tablet daily   BPs look great, no concerns   Continue same dose     Thanks,   Claude Riis

## 2020-12-29 RX ORDER — ALBUTEROL SULFATE 90 UG/1
AEROSOL, METERED RESPIRATORY (INHALATION)
Qty: 8.5 G | Refills: 2 | Status: SHIPPED | OUTPATIENT
Start: 2020-12-29 | End: 2022-01-17 | Stop reason: SDUPTHER

## 2020-12-29 RX ORDER — ATORVASTATIN CALCIUM 40 MG/1
TABLET, FILM COATED ORAL
Qty: 30 TAB | Refills: 4 | Status: SHIPPED | OUTPATIENT
Start: 2020-12-29 | End: 2021-05-24

## 2020-12-30 ENCOUNTER — TELEPHONE (OUTPATIENT)
Dept: CARDIOLOGY CLINIC | Age: 65
End: 2020-12-30

## 2021-01-08 ENCOUNTER — OFFICE VISIT (OUTPATIENT)
Dept: CARDIOLOGY CLINIC | Age: 66
End: 2021-01-08
Payer: MEDICAID

## 2021-01-08 VITALS
RESPIRATION RATE: 18 BRPM | WEIGHT: 170 LBS | OXYGEN SATURATION: 97 % | SYSTOLIC BLOOD PRESSURE: 112 MMHG | HEART RATE: 67 BPM | BODY MASS INDEX: 26.68 KG/M2 | HEIGHT: 67 IN | DIASTOLIC BLOOD PRESSURE: 66 MMHG

## 2021-01-08 DIAGNOSIS — I49.5 SSS (SICK SINUS SYNDROME) (HCC): ICD-10-CM

## 2021-01-08 DIAGNOSIS — Z95.0 PACEMAKER: ICD-10-CM

## 2021-01-08 DIAGNOSIS — I25.10 CORONARY ARTERY DISEASE INVOLVING NATIVE CORONARY ARTERY OF NATIVE HEART WITHOUT ANGINA PECTORIS: ICD-10-CM

## 2021-01-08 DIAGNOSIS — I25.2 OLD MYOCARDIAL INFARCTION: ICD-10-CM

## 2021-01-08 DIAGNOSIS — E78.5 DYSLIPIDEMIA: ICD-10-CM

## 2021-01-08 DIAGNOSIS — I25.10 CORONARY ARTERY DISEASE INVOLVING NATIVE CORONARY ARTERY OF NATIVE HEART WITHOUT ANGINA PECTORIS: Primary | ICD-10-CM

## 2021-01-08 DIAGNOSIS — I10 ESSENTIAL HYPERTENSION: ICD-10-CM

## 2021-01-08 PROCEDURE — 93000 ELECTROCARDIOGRAM COMPLETE: CPT | Performed by: INTERNAL MEDICINE

## 2021-01-08 PROCEDURE — 99213 OFFICE O/P EST LOW 20 MIN: CPT | Performed by: INTERNAL MEDICINE

## 2021-01-08 NOTE — LETTER
1/8/2021 Patient: Estela Moya YOB: 1955 Date of Visit: 1/8/2021 Professor Kaley Boyd DO 
[address] [address] [address] [employer] Ashok Pretty MD 
[address] Fax: [phone] Dear DO Ashok Marin MD, Thank you for referring Mr. Coco Chi to [address] [place] [address] for evaluation. My notes for this consultation are attached. If you have questions, please do not hesitate to call me. I look forward to following your patient along with you.  
 
 
Sincerely, 
 
Tereso Vick MD

## 2021-01-08 NOTE — PROGRESS NOTES
Chief Complaint   Patient presents with    Coronary Artery Disease     6 month follow up -denies cardiac sx      1. Have you been to the ER, urgent care clinic since your last visit? Hospitalized since your last visit? No     2. Have you seen or consulted any other health care providers outside of the [address] [place] [address] since your last visit? Include any pap smears or colon screening.   No

## 2021-01-08 NOTE — PROGRESS NOTES
Subjective/HPI:     Lisa Jenkins is a 60 y.o. male is here for a f/u appt. Last seen by us in 7/2020. He remains in usual state of health. LDL better since we switched to Atorvastatin from pravastatin. He denies CP, SOB/ANGEL, orthopnea, PND, edema, dizziness, lightheadedness, or palpitations. PCP Provider  Alexandra Partida DO  Past Medical History:   Diagnosis Date    Acute myocardial infarction, unspecified site, episode of care unspecified 2/22/2013    CAD (coronary artery disease)     Chest pain 10/15/2014    COPD (chronic obstructive pulmonary disease) ([facility] [address]     Coronary atherosclerosis of native coronary artery 2/22/2013    Exercise induced bronchospasm 2/22/2013    Hypertension     Hyperthyroidism     Old myocardial infarction 2/22/2013    Tobacco use 5/5/2012      Past Surgical History:   Procedure Laterality Date    HX CATARACT REMOVAL Left 02/02/2016    HX OTHER SURGICAL  12/26/2018    Pacemaker    HX PACEMAKER  12/26/2018    CA CARDIAC SURG PROCEDURE UNLIST      stents may 2012      Allergies   Allergen Reactions    Bupropion Other (comments)     Urinary frequency and flatulence.     Chantix [Varenicline] Other (comments)     Trouble sleeping, soreness of mouth    Codeine Itching    Fluticasone Shortness of Breath and Swelling     possibly due to    Melatonin Myalgia    Nasonex [Mometasone] Other (comments)     swelling of throat and hands possibly due to      Family History   Problem Relation Age of Onset    Cancer Mother         lung cancer    Heart Disease Father     Thyroid Disease Brother     Thyroid Disease Maternal Aunt       Current Outpatient Medications   Medication Sig    albuterol (PROVENTIL HFA, VENTOLIN HFA, PROAIR HFA) 90 mcg/actuation inhaler INHALE TWO PUFFS BY MOUTH EVERY 6 HOURS AS NEEDED FOR WHEEZING    atorvastatin (LIPITOR) 40 mg tablet TAKE ONE TABLET BY MOUTH DAILY    carvediloL (COREG) 6.25 mg tablet TAKE ONE TABLET BY MOUTH TWICE A DAY    Trelegy Ellipta 100-62.5-25 mcg inhaler INHALE ONE PUFF BY MOUTH DAILY    losartan (COZAAR) 50 mg tablet TAKE TWO TABLETS BY MOUTH DAILY    hydroCHLOROthiazide (HYDRODIURIL) 12.5 mg tablet TAKE TWO TABLETS BY MOUTH DAILY (Patient taking differently: Take 12.5 mg by mouth daily.)    levothyroxine (SYNTHROID) 100 mcg tablet TAKE ONE TABLET BY MOUTH DAILY BEFORE BREAKFAST    acetaminophen (TYLENOL EXTRA STRENGTH) 500 mg tablet Take  by mouth every six (6) hours as needed for Pain.  nitroglycerin (NITROSTAT) 0.4 mg SL tablet 1 Tab by SubLINGual route every five (5) minutes as needed for Chest Pain.  inhalational spacing device 1 Each by Does Not Apply route as needed.  aspirin delayed-release 81 mg tablet Take 1 Tab by mouth daily. No current facility-administered medications for this visit.        Vitals:    21 1517   BP: 112/66   Pulse: 67   Resp: 18   SpO2: 97%   Weight: 170 lb (77.1 kg)   Height: 5' 7\" (1.702 m)     Social History     Socioeconomic History    Marital status:      Spouse name: Not on file    Number of children: Not on file    Years of education: Not on file    Highest education level: Not on file   Occupational History    Not on file   Social Needs    Financial resource strain: Not on file    Food insecurity     Worry: Not on file     Inability: Not on file    Transportation needs     Medical: Not on file     Non-medical: Not on file   Tobacco Use    Smoking status: Former Smoker     Packs/day: 1.00     Years: 38.00     Pack years: 38.00     Types: Cigarettes     Quit date: 2020     Years since quittin.6    Smokeless tobacco: Never Used   Substance and Sexual Activity    Alcohol use: No     Alcohol/week: 0.0 standard drinks    Drug use: Yes     Types: Marijuana     Comment: 1/4 ounce per week     Sexual activity: Not Currently   Lifestyle    Physical activity     Days per week: Not on file     Minutes per session: Not on file  Stress: Not on file   Relationships    Social connections     Talks on phone: Not on file     Gets together: Not on file     Attends Cheondo[employer] service: Not on file     Active member of club or organization: Not on file     Attends meetings of clubs or organizations: Not on file     Relationship status: Not on file    Intimate partner violence     Fear of current or ex partner: Not on file     Emotionally abused: Not on file     Physically abused: Not on file     Forced sexual activity: Not on file   Other Topics Concern    Not on file   Social History Narrative    Not on file       I have reviewed the nurses notes, vitals, problem list, allergy list, medical history, family, social history and medications. Review of Symptoms:    General: Pt denies excessive weight gain or loss. Pt is able to conduct ADL's  HEENT: Denies blurred vision, headaches, epistaxis and difficulty swallowing. Respiratory: Denies shortness of breath, ANGEL, wheezing or stridor. Cardiovascular: Denies precordial pain, palpitations, edema or PND  Gastrointestinal: Denies poor appetite, indigestion, abdominal pain or blood in stool  Musculoskeletal: Denies pain or swelling from muscles or joints  Neurologic: denies Syncope  Skin: Denies rash, itching or texture change. Physical Exam:      General: Well developed, in no acute distress, cooperative and alert  HEENT: No carotid bruits, no JVD, trach is midline. Neck Supple, PEERL, EOM intact. Heart:  Normal S1/S2 negative S3 or S4. Regular, no murmur, gallop or rub.   Respiratory: Bilateral expiratory wheezing  Abdomen:   Soft, non-tender, no masses, bowel sounds are active.   Extremities:  No edema, normal cap refill, no cyanosis, atraumatic. Neuro: A&Ox3, speech clear, gait stable. Skin: Skin color is normal. No rashes or lesions.  Non diaphoretic  Vascular: 2+ pulses symmetric in all extremities    Cardiographics    ECG: SR      Results for orders placed or performed in visit on 11/26/18   CARDIAC HOLTER MONITOR, 24 HOURS    Narrative    ECG Monitor/24 hours, Complete    Reason for Holter Monitor  SYNCOPE    Heartbeat    Slowest 43  Average 60  Fastest  94        Results:   Underlying Rhythm: Normal sinus rhythm      Atrial Arrhythmias: severe bradycardia, PSVT         AV Conduction: normal    Ventricular Arrhythmias: premature ventricular contractions; occasional     ST Segment Analysis:normal     Symptom Correlation:  None reported    Comment:   Sinus rhythm with PSVT and episodes of profound bradycardia to 40 bpm. Clinical correlation advised.      Golden Preston MD, Lala Jiménez      Results for orders placed or performed during the hospital encounter of 03/19/17   EKG, 12 LEAD, INITIAL   Result Value Ref Range    Ventricular Rate 82 BPM    Atrial Rate 82 BPM    P-R Interval 136 ms    QRS Duration 94 ms    Q-T Interval 384 ms    QTC Calculation (Bezet) 448 ms    Calculated P Axis 61 degrees    Calculated R Axis 76 degrees    Calculated T Axis 63 degrees    Diagnosis       Normal sinus rhythm  Normal ECG  Confirmed by Maurice Kwon ([id number]) on 3/20/2017 8:10:32 AM           Cardiology Labs:  Lab Results   Component Value Date/Time    Cholesterol, total 122 10/12/2020 10:22 AM    HDL Cholesterol 40 10/12/2020 10:22 AM    LDL, calculated 66 10/12/2020 10:22 AM    LDL, calculated 92 10/10/2019 10:52 AM    Triglyceride 83 10/12/2020 10:22 AM    CHOL/HDL Ratio 2.7 06/02/2015 04:16 AM       Lab Results   Component Value Date/Time    Sodium 138 11/13/2020 09:40 AM    Potassium 4.7 11/13/2020 09:40 AM    Chloride 95 (L) 11/13/2020 09:40 AM    CO2 29 11/13/2020 09:40 AM    Anion gap 7 03/19/2017 09:02 PM    Glucose 90 11/13/2020 09:40 AM    BUN 17 11/13/2020 09:40 AM    Creatinine 1.32 (H) 11/13/2020 09:40 AM    BUN/Creatinine ratio 13 11/13/2020 09:40 AM    GFR est AA 65 11/13/2020 09:40 AM    GFR est non-AA 57 (L) 11/13/2020 09:40 AM    Calcium 9.6 11/13/2020 09:40 AM    Bilirubin, total 0.3 10/12/2020 10:22 AM    Alk. phosphatase 101 10/12/2020 10:22 AM    Protein, total 7.2 10/12/2020 10:22 AM    Albumin 4.3 10/12/2020 10:22 AM    Globulin 3.8 03/19/2017 09:02 PM    A-G Ratio 1.5 10/12/2020 10:22 AM    ALT (SGPT) 22 10/12/2020 10:22 AM           Assessment:     Assessment:     Diagnoses and all orders for this visit:    1. Coronary artery disease involving native coronary artery of native heart without angina pectoris  -     AMB POC EKG ROUTINE W/ 12 LEADS, INTER & REP  -     ECHO ADULT COMPLETE; Future    2. Essential hypertension  -     ECHO ADULT COMPLETE; Future    3. Dyslipidemia  -     ECHO ADULT COMPLETE; Future    4. SSS (sick sinus syndrome) (HCC)  -     ECHO ADULT COMPLETE; Future    5. Pacemaker  -     ECHO ADULT COMPLETE; Future    6. Old myocardial infarction  -     ECHO ADULT COMPLETE; Future        ICD-10-CM ICD-9-CM    1. Coronary artery disease involving native coronary artery of native heart without angina pectoris  I25.10 414.01 AMB POC EKG ROUTINE W/ 12 LEADS, INTER & REP      ECHO ADULT COMPLETE   2. Essential hypertension  I10 401.9 ECHO ADULT COMPLETE   3. Dyslipidemia  E78.5 272.4 ECHO ADULT COMPLETE   4. SSS (sick sinus syndrome) (HCC)  I49.5 427.81 ECHO ADULT COMPLETE   5. Pacemaker  Z95.0 V45.01 ECHO ADULT COMPLETE   6. Old myocardial infarction  I25.2 12 ECHO ADULT COMPLETE     Orders Placed This Encounter    AMB POC EKG ROUTINE W/ 12 LEADS, INTER & REP     Order Specific Question:   Reason for Exam:     Answer:   routine        Plan:     CAD:  Cath in 2018 showed nonobstructive disease  EKG NSR. C  Continue BB, statin, and ASA  Checking echo     HTN: Controlled with current therapy    Hyperlipidemia: 10/2020 LDL 66 On statin     SSS with pacemaker: device interrogation in Sept with appropriate function. Per Dr Josselin Kruger.    COPD: On inhalers, followed by PCP     F/U in 6 months with Dr Laureen Bloom.

## 2021-01-11 ENCOUNTER — OFFICE VISIT (OUTPATIENT)
Dept: CARDIOLOGY CLINIC | Age: 66
End: 2021-01-11
Payer: MEDICAID

## 2021-01-11 DIAGNOSIS — Z95.0 CARDIAC PACEMAKER IN SITU: Primary | ICD-10-CM

## 2021-01-11 DIAGNOSIS — I49.5 SSS (SICK SINUS SYNDROME) (HCC): ICD-10-CM

## 2021-01-11 PROCEDURE — 93296 REM INTERROG EVL PM/IDS: CPT | Performed by: INTERNAL MEDICINE

## 2021-01-11 PROCEDURE — 93294 REM INTERROG EVL PM/LDLS PM: CPT | Performed by: INTERNAL MEDICINE

## 2021-01-18 ENCOUNTER — ANCILLARY PROCEDURE (OUTPATIENT)
Dept: CARDIOLOGY CLINIC | Age: 66
End: 2021-01-18
Payer: MEDICAID

## 2021-01-18 VITALS
DIASTOLIC BLOOD PRESSURE: 66 MMHG | SYSTOLIC BLOOD PRESSURE: 112 MMHG | HEIGHT: 67 IN | WEIGHT: 170 LBS | BODY MASS INDEX: 26.68 KG/M2

## 2021-01-18 PROCEDURE — 93306 TTE W/DOPPLER COMPLETE: CPT | Performed by: INTERNAL MEDICINE

## 2021-01-19 LAB
ECHO AO ROOT DIAM: 3.43 CM
ECHO AV PEAK GRADIENT: 5.39 MMHG
ECHO AV PEAK VELOCITY: 116.06 CM/S
ECHO LA AREA 4C: 11.35 CM2
ECHO LA MAJOR AXIS: 3.65 CM
ECHO LA MINOR AXIS: 1.93 CM
ECHO LA VOL 2C: 25.85 ML (ref 18–58)
ECHO LA VOL 4C: 22.18 ML (ref 18–58)
ECHO LA VOL BP: 26.75 ML (ref 18–58)
ECHO LA VOL/BSA BIPLANE: 14.18 ML/M2 (ref 16–28)
ECHO LA VOLUME INDEX A2C: 13.7 ML/M2 (ref 16–28)
ECHO LA VOLUME INDEX A4C: 11.75 ML/M2 (ref 16–28)
ECHO LV E' LATERAL VELOCITY: 10.89 CM/S
ECHO LV INTERNAL DIMENSION DIASTOLIC: 4.04 CM (ref 4.2–5.9)
ECHO LV INTERNAL DIMENSION SYSTOLIC: 2.86 CM
ECHO LV ISOVOLUMETRIC RELAXATION TIME (IVRT): 97.05 MS
ECHO LV IVSD: 1.3 CM (ref 0.6–1)
ECHO LV MASS 2D: 191.5 G (ref 88–224)
ECHO LV MASS INDEX 2D: 101.5 G/M2 (ref 49–115)
ECHO LV POSTERIOR WALL DIASTOLIC: 1.32 CM (ref 0.6–1)
ECHO LVOT PEAK GRADIENT: 4.74 MMHG
ECHO LVOT PEAK VELOCITY: 108.83 CM/S
ECHO MV "A" WAVE DURATION: 169.36 MS
ECHO MV A VELOCITY: 68.78 CM/S
ECHO MV E DECELERATION TIME (DT): 244.74 MS
ECHO MV E VELOCITY: 87.58 CM/S
ECHO MV E/A RATIO: 1.27
ECHO MV E/E' LATERAL: 8.04
ECHO PVEIN A DURATION: 108.47 MS
ECHO PVEIN A VELOCITY: 32.63 CM/S
LA VOL DISK BP: 25.17 ML (ref 18–58)

## 2021-01-20 NOTE — TELEPHONE ENCOUNTER
----- Message from Flavio Mann MD sent at 1/20/2021 11:39 AM EST -----  Echo OK, normal pumping function

## 2021-02-11 ENCOUNTER — OFFICE VISIT (OUTPATIENT)
Dept: INTERNAL MEDICINE CLINIC | Age: 66
End: 2021-02-11
Payer: MEDICAID

## 2021-02-11 VITALS
SYSTOLIC BLOOD PRESSURE: 139 MMHG | HEIGHT: 67 IN | DIASTOLIC BLOOD PRESSURE: 78 MMHG | WEIGHT: 168.6 LBS | HEART RATE: 75 BPM | TEMPERATURE: 97.5 F | OXYGEN SATURATION: 96 % | BODY MASS INDEX: 26.46 KG/M2 | RESPIRATION RATE: 16 BRPM

## 2021-02-11 DIAGNOSIS — J44.9 CHRONIC OBSTRUCTIVE PULMONARY DISEASE, UNSPECIFIED COPD TYPE (HCC): ICD-10-CM

## 2021-02-11 DIAGNOSIS — Z87.891 EX-SMOKER: ICD-10-CM

## 2021-02-11 DIAGNOSIS — I25.118 CORONARY ARTERY DISEASE OF NATIVE ARTERY OF NATIVE HEART WITH STABLE ANGINA PECTORIS (HCC): ICD-10-CM

## 2021-02-11 DIAGNOSIS — F41.0 PANIC ATTACKS: ICD-10-CM

## 2021-02-11 DIAGNOSIS — I10 ESSENTIAL HYPERTENSION: ICD-10-CM

## 2021-02-11 DIAGNOSIS — E03.9 ACQUIRED HYPOTHYROIDISM: ICD-10-CM

## 2021-02-11 DIAGNOSIS — E78.2 MIXED HYPERLIPIDEMIA: ICD-10-CM

## 2021-02-11 DIAGNOSIS — R73.03 PREDIABETES: ICD-10-CM

## 2021-02-11 DIAGNOSIS — M25.512 BILATERAL SHOULDER PAIN, UNSPECIFIED CHRONICITY: Primary | ICD-10-CM

## 2021-02-11 DIAGNOSIS — M25.511 BILATERAL SHOULDER PAIN, UNSPECIFIED CHRONICITY: Primary | ICD-10-CM

## 2021-02-11 PROCEDURE — 99214 OFFICE O/P EST MOD 30 MIN: CPT | Performed by: INTERNAL MEDICINE

## 2021-02-11 RX ORDER — IBUPROFEN 600 MG/1
600 TABLET ORAL
Qty: 30 TAB | Refills: 0 | Status: SHIPPED | OUTPATIENT
Start: 2021-02-11 | End: 2021-02-22

## 2021-02-11 NOTE — PATIENT INSTRUCTIONS
Shoulder Arthritis: Exercises  Introduction  Here are some examples of exercises for you to try. The exercises may be suggested for a condition or for rehabilitation. Start each exercise slowly. Ease off the exercises if you start to have pain. You will be told when to start these exercises and which ones will work best for you. How to do the exercises  Shoulder flexion (lying down)   To make a wand for this exercise, use a piece of PVC pipe or a broom handle with the broom removed. Make the wand about a foot wider than your shoulders. 1. Lie on your back, holding a wand with both hands. Your palms should face down as you hold the wand. 2. Keeping your elbows straight, slowly raise your arms over your head. Raise them until you feel a stretch in your shoulders, upper back, and chest.  3. Hold for 15 to 30 seconds. 4. Repeat 2 to 4 times. Shoulder rotation (lying down)   To make a wand for this exercise, use a piece of PVC pipe or a broom handle with the broom removed. Make the wand about a foot wider than your shoulders. 1. Lie on your back. Hold a wand with both hands with your elbows bent and palms up. 2. Keep your elbows close to your body, and move the wand across your body toward the sore arm. 3. Hold for 8 to 12 seconds. 4. Repeat 2 to 4 times. Shoulder internal rotation with towel   1. Hold a towel above and behind your head with the arm that is not sore. 2. With your sore arm, reach behind your back and grasp the towel. 3. With the arm above your head, pull the towel upward. Do this until you feel a stretch on the front and outside of your sore shoulder. 4. Hold 15 to 30 seconds. 5. Repeat 2 to 4 times. Shoulder blade squeeze   1. Stand with your arms at your sides, and squeeze your shoulder blades together. Do not raise your shoulders up as you squeeze. 2. Hold 6 seconds. 3. Repeat 8 to 12 times.     Resisted rows   For this exercise, you will need elastic exercise material, such as surgical tubing or Thera-Band. 1. Put the band around a solid object at about waist level. (A bedpost will work well.) Each hand should hold an end of the band. 2. With your elbows at your sides and bent to 90 degrees, pull the band back. Your shoulder blades should move toward each other. Return to the starting position. 3. Repeat 8 to 12 times. External rotator strengthening exercise   1. Start by tying a piece of elastic exercise material to a doorknob. You can use surgical tubing or Thera-Band. (You may also hold one end of the band in each hand.)  2. Stand or sit with your shoulder relaxed and your elbow bent 90 degrees. Your upper arm should rest comfortably against your side. Squeeze a rolled towel between your elbow and your body for comfort. This will help keep your arm at your side. 3. Hold one end of the elastic band with the hand of the painful arm. 4. Start with your forearm across your belly. Slowly rotate the forearm out away from your body. Keep your elbow and upper arm tucked against the towel roll or the side of your body until you begin to feel tightness in your shoulder. Slowly move your arm back to where you started. 5. Repeat 8 to 12 times. Internal rotator strengthening exercise   1. Start by tying a piece of elastic exercise material to a doorknob. You can use surgical tubing or Thera-Band. 2. Stand or sit with your shoulder relaxed and your elbow bent 90 degrees. Your upper arm should rest comfortably against your side. Squeeze a rolled towel between your elbow and your body for comfort. This will help keep your arm at your side. 3. Hold one end of the elastic band in the hand of the painful arm. 4. Slowly rotate your forearm toward your body until it touches your belly. Slowly move it back to where you started. 5. Keep your elbow and upper arm firmly tucked against the towel roll or at your side. 6. Repeat 8 to 12 times.     Pendulum swing   If you have pain in your back, do not do this exercise. 1. Hold on to a table or the back of a chair with your good arm. Then bend forward a little and let your sore arm hang straight down. This exercise does not use the arm muscles. Rather, use your legs and your hips to create movement that makes your arm swing freely. 2. Use the movement from your hips and legs to guide the slightly swinging arm back and forth like a pendulum (or elephant trunk). Then guide it in circles that start small (about the size of a dinner plate). Make the circles a bit larger each day, as your pain allows. 3. Do this exercise for 5 minutes, 5 to 7 times each day. 4. As you have less pain, try bending over a little farther to do this exercise. This will increase the amount of movement at your shoulder. Follow-up care is a key part of your treatment and safety. Be sure to make and go to all appointments, and call your doctor if you are having problems. It's also a good idea to know your test results and keep a list of the medicines you take. Where can you learn more? Go to http://www.gray.com/  Enter H562 in the search box to learn more about \"Shoulder Arthritis: Exercises. \"  Current as of: March 2, 2020               Content Version: 12.6  © 7410-4929 [employer]. Care instructions adapted under license by [employer] (which disclaims liability or warranty for this information). If you have questions about a medical condition or this instruction, always ask your healthcare professional. [place] [address] any warranty or liability for your use of this information. Shoulder Stretches: Exercises  Introduction  Here are some examples of exercises for you to try. The exercises may be suggested for a condition or for rehabilitation. Start each exercise slowly. Ease off the exercises if you start to have pain.   You will be told when to start these exercises and which ones will work best for you.  How to do the exercises  Shoulder stretch   5.  a doorway and place one arm against the door frame. Your elbow should be a little higher than your shoulder. 6. Relax your shoulders as you lean forward, allowing your chest and shoulder muscles to stretch. You can also turn your body slightly away from your arm to stretch the muscles even more. 7. Hold for 15 to 30 seconds. 8. Repeat 2 to 4 times with each arm. Shoulder and chest stretch   5. Shoulder and chest stretch  6. While sitting, relax your upper body so you slump slightly in your chair. 7. As you breathe in, straighten your back and open your arms out to the sides. 8. Gently pull your shoulder blades back and downward. 9. Hold for 15 to 30 seconds as your breathe normally. 10. Repeat 2 to 4 times. Overhead stretch   6. Reach up over your head with both arms. 7. Hold for 15 to 30 seconds. 8. Repeat 2 to 4 times. Follow-up care is a key part of your treatment and safety. Be sure to make and go to all appointments, and call your doctor if you are having problems. It's also a good idea to know your test results and keep a list of the medicines you take. Where can you learn more? Go to http://www.gray.com/  Enter S254 in the search box to learn more about \"Shoulder Stretches: Exercises. \"  Current as of: March 2, 2020               Content Version: 12.6  © 4274-1007 [employer]. Care instructions adapted under license by [employer] (which disclaims liability or warranty for this information). If you have questions about a medical condition or this instruction, always ask your healthcare professional. [place] [address] any warranty or liability for your use of this information. Would use ice to sore shoulders for 15 minutes twice daily.

## 2021-02-11 NOTE — PROGRESS NOTES
Edmond Menjivar is a 60 y.o. male who presents for evaluation of routine follow up. Last seen by me aug 11, 2020. Has done overall well since then, though both shoulders have been acting up of late. Was a  for 25 years. No trauma, but is sore across both shoulders and upper back/neck area. Uses tylenol arthritis with minimal relief. ROS:  Constitutional: negative for fevers, chills, anorexia and weight loss  Eyes:   negative for visual disturbance and irritation  ENT:   negative for tinnitus,sore throat,nasal congestion,ear pain,hoarseness  Respiratory:  negative for cough, hemoptysis, dyspnea,wheezing  CV:   negative for chest pain, palpitations, lower extremity edema  GI:   negative for nausea, vomiting, diarrhea, abdominal pain,melena  Genitourinary: negative for frequency, dysuria and hematuria  Musculoskel: negative for myalgias, arthralgias, back pain, muscle weakness.   ++bilateral shoulders joint pain  Neurological:  negative for headaches, dizziness, focal weakness, numbness  Psychiatric:     Negative for depression or anxiety      Past Medical History:   Diagnosis Date    Acute myocardial infarction, unspecified site, episode of care unspecified 2/22/2013    CAD (coronary artery disease)     Chest pain 10/15/2014    COPD [facility] obstructive pulmonary disease) [facility] [address]     Coronary atherosclerosis of native coronary artery 2/22/2013    Exercise induced bronchospasm 2/22/2013    Hypertension     Hyperthyroidism     Old myocardial infarction 2/22/2013    Tobacco use 5/5/2012       Past Surgical History:   Procedure Laterality Date    HX CATARACT REMOVAL Left 02/02/2016    HX OTHER SURGICAL  12/26/2018    Pacemaker    HX PACEMAKER  12/26/2018    SD CARDIAC SURG PROCEDURE UNLIST      stents may 2012        Family History   Problem Relation Age of Onset    Cancer Mother         lung cancer    Heart Disease Father     Thyroid Disease Brother     Thyroid Disease Maternal Aunt Social History     Socioeconomic History    Marital status:      Spouse name: Not on file    Number of children: Not on file    Years of education: Not on file    Highest education level: Not on file   Occupational History    Not on file   Social Needs    Financial resource strain: Not on file    Food insecurity     Worry: Not on file     Inability: Not on file    Transportation needs     Medical: Not on file     Non-medical: Not on file   Tobacco Use    Smoking status: Former Smoker     Packs/day: 1.00     Years: 38.00     Pack years: 38.00     Types: Cigarettes     Quit date: 2020     Years since quittin.7    Smokeless tobacco: Never Used   Substance and Sexual Activity    Alcohol use: No     Alcohol/week: 0.0 standard drinks    Drug use: Yes     Types: Marijuana     Comment: 1/4 ounce per week     Sexual activity: Not Currently   Lifestyle    Physical activity     Days per week: Not on file     Minutes per session: Not on file    Stress: Not on file   Relationships    Social connections     Talks on phone: Not on file     Gets together: Not on file     Attends Christian service: Not on file     Active member of club or organization: Not on file     Attends meetings of clubs or organizations: Not on file     Relationship status: Not on file    Intimate partner violence     Fear of current or ex partner: Not on file     Emotionally abused: Not on file     Physically abused: Not on file     Forced sexual activity: Not on file   Other Topics Concern    Not on file   Social History Narrative    Not on file            Visit Vitals  /78 (BP 1 Location: Left upper arm, BP Patient Position: Sitting)   Pulse 75   Temp 97.5 °F (36.4 °C) (Temporal)   Resp 16   Ht 5' 7\" (1.702 m)   Wt 168 lb 9.6 oz (76.5 kg)   SpO2 96%   BMI 26.41 kg/m²       Physical Examination:   General - Well appearing male  HEENT - PERRL, TM no erythema/opacification, normal nasal turbinates, no oropharyngeal erythema or exudate, MMM  Neck - supple, no bruits, no thyroidomegaly, no lymphadenopathy  Pulm - clear to auscultation bilaterally  Cardio - RRR, normal S1 S2, no murmur  Abd - soft, nontender, no masses, no HSM  Extrem - no edema, +2 distal pulses. Decreased rom with both arms in shoulder joints. Neuro-  No focal deficits, CN intact     Assessment/Plan:    1. Bilateral shoulder pain--check xrays, suspect has oa. rx given for ibuprofen 600. Also suggested ice  2. Copd--improved with trelegy. Also stopped smoking cigarettes may 2020 (still smokes MJ regularly though)  3. Cad with stents--on asa, coreg, cozaar. Check cbc, cmp  4. Hypothyroid--on synthroid, check tsh  5.  predm--last a1c 5.8, check again  6. Anx/MONIE--improved, MJ helps keep him 'mellow'  7.  hyperlipids--on lipitor, check flp  8.  htn--continue coreg, cozaar, hctz    Had flu shot in sept.   Declines colon  rtc 6 months        Domonique Delatorre III, DO

## 2021-02-12 LAB
ALBUMIN SERPL-MCNC: 4.4 G/DL (ref 3.8–4.8)
ALBUMIN/GLOB SERPL: 1.6 {RATIO} (ref 1.2–2.2)
ALP SERPL-CCNC: 124 IU/L (ref 39–117)
ALT SERPL-CCNC: 22 IU/L (ref 0–44)
AST SERPL-CCNC: 22 IU/L (ref 0–40)
BASOPHILS # BLD AUTO: 0 X10E3/UL (ref 0–0.2)
BASOPHILS NFR BLD AUTO: 0 %
BILIRUB SERPL-MCNC: 0.5 MG/DL (ref 0–1.2)
BUN SERPL-MCNC: 14 MG/DL (ref 8–27)
BUN/CREAT SERPL: 14 (ref 10–24)
CALCIUM SERPL-MCNC: 9.5 MG/DL (ref 8.6–10.2)
CHLORIDE SERPL-SCNC: 96 MMOL/L (ref 96–106)
CHOLEST SERPL-MCNC: 134 MG/DL (ref 100–199)
CO2 SERPL-SCNC: 27 MMOL/L (ref 20–29)
CREAT SERPL-MCNC: 0.99 MG/DL (ref 0.76–1.27)
EOSINOPHIL # BLD AUTO: 0.4 X10E3/UL (ref 0–0.4)
EOSINOPHIL NFR BLD AUTO: 4 %
ERYTHROCYTE [DISTWIDTH] IN BLOOD BY AUTOMATED COUNT: 12.9 % (ref 11.6–15.4)
EST. AVERAGE GLUCOSE BLD GHB EST-MCNC: 126 MG/DL
GLOBULIN SER CALC-MCNC: 2.7 G/DL (ref 1.5–4.5)
GLUCOSE SERPL-MCNC: 94 MG/DL (ref 65–99)
HBA1C MFR BLD: 6 % (ref 4.8–5.6)
HCT VFR BLD AUTO: 41.7 % (ref 37.5–51)
HDLC SERPL-MCNC: 41 MG/DL
HGB BLD-MCNC: 14.5 G/DL (ref 13–17.7)
IMM GRANULOCYTES # BLD AUTO: 0 X10E3/UL (ref 0–0.1)
IMM GRANULOCYTES NFR BLD AUTO: 0 %
LDLC SERPL CALC-MCNC: 69 MG/DL (ref 0–99)
LYMPHOCYTES # BLD AUTO: 2.2 X10E3/UL (ref 0.7–3.1)
LYMPHOCYTES NFR BLD AUTO: 24 %
MCH RBC QN AUTO: 32 PG (ref 26.6–33)
MCHC RBC AUTO-ENTMCNC: 34.8 G/DL (ref 31.5–35.7)
MCV RBC AUTO: 92 FL (ref 79–97)
MONOCYTES # BLD AUTO: 0.9 X10E3/UL (ref 0.1–0.9)
MONOCYTES NFR BLD AUTO: 10 %
NEUTROPHILS # BLD AUTO: 5.7 X10E3/UL (ref 1.4–7)
NEUTROPHILS NFR BLD AUTO: 62 %
PLATELET # BLD AUTO: 194 X10E3/UL (ref 150–450)
POTASSIUM SERPL-SCNC: 4.6 MMOL/L (ref 3.5–5.2)
PROT SERPL-MCNC: 7.1 G/DL (ref 6–8.5)
PSA SERPL-MCNC: 0.6 NG/ML (ref 0–4)
RBC # BLD AUTO: 4.53 X10E6/UL (ref 4.14–5.8)
REFLEX CRITERIA: NORMAL
SODIUM SERPL-SCNC: 136 MMOL/L (ref 134–144)
TRIGL SERPL-MCNC: 136 MG/DL (ref 0–149)
TSH SERPL DL<=0.005 MIU/L-ACNC: 1.87 UIU/ML (ref 0.45–4.5)
VLDLC SERPL CALC-MCNC: 24 MG/DL (ref 5–40)
WBC # BLD AUTO: 9.1 X10E3/UL (ref 3.4–10.8)

## 2021-02-22 DIAGNOSIS — M25.512 BILATERAL SHOULDER PAIN, UNSPECIFIED CHRONICITY: ICD-10-CM

## 2021-02-22 DIAGNOSIS — M25.511 BILATERAL SHOULDER PAIN, UNSPECIFIED CHRONICITY: ICD-10-CM

## 2021-02-22 RX ORDER — IBUPROFEN 600 MG/1
TABLET ORAL
Qty: 30 TAB | Refills: 0 | Status: SHIPPED | OUTPATIENT
Start: 2021-02-22 | End: 2021-03-08

## 2021-03-07 DIAGNOSIS — M25.511 BILATERAL SHOULDER PAIN, UNSPECIFIED CHRONICITY: ICD-10-CM

## 2021-03-07 DIAGNOSIS — M25.512 BILATERAL SHOULDER PAIN, UNSPECIFIED CHRONICITY: ICD-10-CM

## 2021-03-08 RX ORDER — IBUPROFEN 600 MG/1
TABLET ORAL
Qty: 30 TAB | Refills: 0 | Status: SHIPPED | OUTPATIENT
Start: 2021-03-08 | End: 2021-03-22

## 2021-03-22 DIAGNOSIS — M25.511 BILATERAL SHOULDER PAIN, UNSPECIFIED CHRONICITY: ICD-10-CM

## 2021-03-22 DIAGNOSIS — M25.512 BILATERAL SHOULDER PAIN, UNSPECIFIED CHRONICITY: ICD-10-CM

## 2021-03-22 RX ORDER — IBUPROFEN 600 MG/1
TABLET ORAL
Qty: 30 TAB | Refills: 0 | Status: SHIPPED | OUTPATIENT
Start: 2021-03-22 | End: 2022-02-15

## 2021-04-12 ENCOUNTER — OFFICE VISIT (OUTPATIENT)
Dept: CARDIOLOGY CLINIC | Age: 66
End: 2021-04-12
Payer: MEDICAID

## 2021-04-12 DIAGNOSIS — Z95.0 CARDIAC PACEMAKER IN SITU: Primary | ICD-10-CM

## 2021-04-12 DIAGNOSIS — I49.5 SSS (SICK SINUS SYNDROME) (HCC): ICD-10-CM

## 2021-04-12 PROCEDURE — 93296 REM INTERROG EVL PM/IDS: CPT | Performed by: INTERNAL MEDICINE

## 2021-04-12 PROCEDURE — 93294 REM INTERROG EVL PM/LDLS PM: CPT | Performed by: INTERNAL MEDICINE

## 2021-04-25 RX ORDER — FLUTICASONE FUROATE, UMECLIDINIUM BROMIDE AND VILANTEROL TRIFENATATE 100; 62.5; 25 UG/1; UG/1; UG/1
POWDER RESPIRATORY (INHALATION)
Qty: 60 BLISTER | Refills: 4 | Status: SHIPPED | OUTPATIENT
Start: 2021-04-25 | End: 2021-08-12 | Stop reason: ALTCHOICE

## 2021-05-24 RX ORDER — ATORVASTATIN CALCIUM 40 MG/1
TABLET, FILM COATED ORAL
Qty: 30 TABLET | Refills: 3 | Status: SHIPPED | OUTPATIENT
Start: 2021-05-24 | End: 2021-09-20

## 2021-06-25 ENCOUNTER — TELEPHONE (OUTPATIENT)
Dept: INTERNAL MEDICINE CLINIC | Age: 66
End: 2021-06-25

## 2021-06-25 NOTE — TELEPHONE ENCOUNTER
----- Message from [employer] sent at 6/25/2021  9:29 AM EDT -----  Regarding: Dr. Gabe Brizuela: [phone]  General Message/Vendor Calls    Caller's first and last name: N/A      Reason for call: PT received letter from insurance stating Angeles Castro is being recalled by  and advised to contact PCP for alternative.       Callback required yes/no and why: yes/follow up       Best contact number(s): [phone]      Details to clarify the request: N/A      [employer]

## 2021-06-28 NOTE — TELEPHONE ENCOUNTER
Patient states that Trelegy is currently being recalled and was informed to contact provider for alternative at this time.

## 2021-06-29 RX ORDER — BUDESONIDE, GLYCOPYRROLATE, AND FORMOTEROL FUMARATE 160; 9; 4.8 UG/1; UG/1; UG/1
2 AEROSOL, METERED RESPIRATORY (INHALATION) 2 TIMES DAILY
Qty: 3 INHALER | Refills: 3 | Status: SHIPPED | OUTPATIENT
Start: 2021-06-29 | End: 2021-06-30

## 2021-06-30 ENCOUNTER — OFFICE VISIT (OUTPATIENT)
Dept: CARDIOLOGY CLINIC | Age: 66
End: 2021-06-30

## 2021-06-30 ENCOUNTER — OFFICE VISIT (OUTPATIENT)
Dept: CARDIOLOGY CLINIC | Age: 66
End: 2021-06-30
Payer: MEDICAID

## 2021-06-30 VITALS
RESPIRATION RATE: 18 BRPM | DIASTOLIC BLOOD PRESSURE: 60 MMHG | BODY MASS INDEX: 26.06 KG/M2 | WEIGHT: 166 LBS | SYSTOLIC BLOOD PRESSURE: 112 MMHG | OXYGEN SATURATION: 94 % | HEIGHT: 67 IN | HEART RATE: 64 BPM

## 2021-06-30 DIAGNOSIS — I47.1 PSVT (PAROXYSMAL SUPRAVENTRICULAR TACHYCARDIA) (HCC): ICD-10-CM

## 2021-06-30 DIAGNOSIS — I49.5 SSS (SICK SINUS SYNDROME) (HCC): Primary | ICD-10-CM

## 2021-06-30 DIAGNOSIS — R55 SYNCOPE, UNSPECIFIED SYNCOPE TYPE: ICD-10-CM

## 2021-06-30 DIAGNOSIS — I25.2 OLD MYOCARDIAL INFARCTION: ICD-10-CM

## 2021-06-30 DIAGNOSIS — Z95.0 CARDIAC PACEMAKER IN SITU: Primary | ICD-10-CM

## 2021-06-30 DIAGNOSIS — Z95.0 CARDIAC PACEMAKER IN SITU: ICD-10-CM

## 2021-06-30 DIAGNOSIS — I49.5 SSS (SICK SINUS SYNDROME) (HCC): ICD-10-CM

## 2021-06-30 PROCEDURE — 93280 PM DEVICE PROGR EVAL DUAL: CPT | Performed by: INTERNAL MEDICINE

## 2021-06-30 PROCEDURE — 99214 OFFICE O/P EST MOD 30 MIN: CPT | Performed by: NURSE PRACTITIONER

## 2021-06-30 RX ORDER — DEXTROMETHORPHAN HYDROBROMIDE, GUAIFENESIN 5; 100 MG/5ML; MG/5ML
650 LIQUID ORAL EVERY 8 HOURS
COMMUNITY

## 2021-06-30 NOTE — PROGRESS NOTES
Chief Complaint   Patient presents with    Pacemaker Check     annual follow up -    Chest Pain     occassional - doesn't last long     Shortness of Breath     has COPD     1. Have you been to the ER, urgent care clinic since your last visit? Hospitalized since your last visit? No     2. Have you seen or consulted any other health care providers outside of the [address] [place] [address] since your last visit? Include any pap smears or colon screening.   No

## 2021-06-30 NOTE — PROGRESS NOTES
ELECTROPHYSIOLOGY        Subjective:      Rachelle Epstein is a 60 y.o. male is here for annual EP follow up. Is without cardiac complaints. He denies chest pain/ shortness of breath, orthopnea, PND, LE edema, palpitations, syncope, presyncope or fatigue. Sees Dr Morris Flowers twice a year. Reports feeling much better since ppm implant 2018. Patient Active Problem List    Diagnosis Date Noted    SSS (sick sinus syndrome) ([facility] [address] 12/26/2018    S/P cardiac cath 12/06/2018    Acquired hypothyroidism 06/15/2016    ASHD (arteriosclerotic heart disease) 06/12/2015    Essential hypertension 04/28/2015    Insomnia 12/29/2014    COPD (chronic obstructive pulmonary disease) ([facility] [address]     Coronary atherosclerosis of native coronary artery 02/22/2013    Exercise induced bronchospasm 02/22/2013    Old myocardial infarction 02/22/2013    S/P coronary artery stent placement 05/08/2012    Dyslipidemia 05/08/2012    STEMI (ST elevation myocardial infarction) ([facility] [address] 05/05/2012    Tobacco use 05/05/2012      Jessica Womack DO  Past Medical History:   Diagnosis Date    Acute myocardial infarction, unspecified site, episode of care unspecified 2/22/2013    CAD (coronary artery disease)     Chest pain 10/15/2014    COPD (chronic obstructive pulmonary disease) ([facility] [address]     Coronary atherosclerosis of native coronary artery 2/22/2013    Exercise induced bronchospasm 2/22/2013    Hypertension     Hyperthyroidism     Old myocardial infarction 2/22/2013    Tobacco use 5/5/2012      Past Surgical History:   Procedure Laterality Date    HX CATARACT REMOVAL Left 02/02/2016    HX OTHER SURGICAL  12/26/2018    Pacemaker    HX PACEMAKER  12/26/2018    SC CARDIAC SURG PROCEDURE UNLIST      stents may 2012      Allergies   Allergen Reactions    Bupropion Other (comments)     Urinary frequency and flatulence.     Chantix [Varenicline] Other (comments)     Trouble sleeping, soreness of mouth    Codeine Itching  Fluticasone Shortness of Breath and Swelling     possibly due to    Melatonin Myalgia    Nasonex [Mometasone] Other (comments)     swelling of throat and hands possibly due to      Family History   Problem Relation Age of Onset    Cancer Mother         lung cancer    Heart Disease Father     Thyroid Disease Brother     Thyroid Disease Maternal Aunt     negative for cardiac disease  Social History     Socioeconomic History    Marital status:      Spouse name: Not on file    Number of children: Not on file    Years of education: Not on file    Highest education level: Not on file   Tobacco Use    Smoking status: Former Smoker     Packs/day: 1.00     Years: 45.00     Pack years: 45.00     Types: Cigarettes     Quit date: 2020     Years since quittin.0    Smokeless tobacco: Never Used   Vaping Use    Vaping Use: Never used   Substance and Sexual Activity    Alcohol use: No     Alcohol/week: 0.0 standard drinks    Drug use: Yes     Types: Marijuana     Comment: 1/4 ounce per week     Sexual activity: Not Currently     Social Determinants of Health     Financial Resource Strain:     Difficulty of Paying Living Expenses:    Food Insecurity:     Worried About Running Out of Food in the Last Year:     Ran Out of Food in the Last Year:    Transportation Needs:     Lack of Transportation (Medical):      Lack of Transportation (Non-Medical):    Physical Activity:     Days of Exercise per Week:     Minutes of Exercise per Session:    Stress:     Feeling of Stress :    Social Connections:     Frequency of Communication with Friends and Family:     Frequency of Social Gatherings with Friends and Family:     Attends Catholic Services:     Active Member of Clubs or Organizations:     Attends Club or Organization Meetings:     Marital Status:      Current Outpatient Medications   Medication Sig    acetaminophen (Tylenol Arthritis Pain) 650 mg TbER Take 650 mg by mouth every eight (8) hours.  atorvastatin (LIPITOR) 40 mg tablet TAKE ONE TABLET BY MOUTH DAILY    Trelegy Ellipta 100-62.5-25 mcg inhaler INHALE ONE PUFF BY MOUTH DAILY    ibuprofen (MOTRIN) 600 mg tablet TAKE ONE TABLET BY MOUTH EVERY 12 HOURS AS NEEDED FOR PAIN    levothyroxine (SYNTHROID) 100 mcg tablet TAKE ONE TABLET BY MOUTH DAILY BEFORE BREAKFAST    albuterol (PROVENTIL HFA, VENTOLIN HFA, PROAIR HFA) 90 mcg/actuation inhaler INHALE TWO PUFFS BY MOUTH EVERY 6 HOURS AS NEEDED FOR WHEEZING    carvediloL (COREG) 6.25 mg tablet TAKE ONE TABLET BY MOUTH TWICE A DAY    losartan (COZAAR) 50 mg tablet TAKE TWO TABLETS BY MOUTH DAILY    nitroglycerin (NITROSTAT) 0.4 mg SL tablet 1 Tab by SubLINGual route every five (5) minutes as needed for Chest Pain.  inhalational spacing device 1 Each by Does Not Apply route as needed.  aspirin delayed-release 81 mg tablet Take 1 Tab by mouth daily.  hydroCHLOROthiazide (HYDRODIURIL) 12.5 mg tablet Take 1 Tab by mouth daily.  acetaminophen (TYLENOL EXTRA STRENGTH) 500 mg tablet Take  by mouth every six (6) hours as needed for Pain. No current facility-administered medications for this visit. Vitals:    06/30/21 0907   BP: 112/60   Pulse: 64   Resp: 18   SpO2: 94%   Weight: 166 lb (75.3 kg)   Height: 5' 7\" (1.702 m)       I have reviewed the nurses notes, vitals, problem list, allergy list, medical history, family, social history and medications. Review of Symptoms:    General: Pt denies excessive weight gain or loss. Pt is able to conduct ADL's  HEENT: Denies blurred vision, headaches, epistaxis and difficulty swallowing. Respiratory: Denies shortness of breath, ANGEL, wheezing or stridor.   Cardiovascular: Denies precordial pain, palpitations, edema or PND  Gastrointestinal: Denies poor appetite, indigestion, abdominal pain or blood in stool  Urinary: Denies dysuria, pyuria  Musculoskeletal: Denies pain or swelling from muscles or joints  Neurologic: Denies tremor, paresthesias, or sensory motor disturbance  Skin: Denies rash, itching or texture change. Psych: Denies depression      Physical Exam:      General: Well developed, in no acute distress. HEENT: Eyes - PERRL  Heart:  Normal S1/S2 negative S3 or S4. Regular, no murmur  Respiratory: Clear bilaterally x 4, no wheezing or rales  Extremities:  No edema, no cyanosis. Musculoskeletal: No clubbing  Neuro: A&Ox3, speech clear  Skin: No visible rashes or lesions. Non diaphoretic. No visible ulcers  Vascular: 2+ pulses symmetric in all extremities  Psych - judgement intact and orientation is wnl       Cardiographics      Results for orders placed or performed in visit on 11/26/18   CARDIAC HOLTER MONITOR, 24 HOURS    Narrative    ECG Monitor/24 hours, Complete    Reason for Holter Monitor  SYNCOPE    Heartbeat    Slowest 43  Average 60  Fastest  94        Results:   Underlying Rhythm: Normal sinus rhythm      Atrial Arrhythmias: severe bradycardia, PSVT         AV Conduction: normal    Ventricular Arrhythmias: premature ventricular contractions; occasional     ST Segment Analysis:normal     Symptom Correlation:  None reported    Comment:   Sinus rhythm with PSVT and episodes of profound bradycardia to 40 bpm. Clinical correlation advised.      Gracie Philip MD, Aleena Barber      Results for orders placed or performed during the hospital encounter of 03/19/17   EKG, 12 LEAD, INITIAL   Result Value Ref Range    Ventricular Rate 82 BPM    Atrial Rate 82 BPM    P-R Interval 136 ms    QRS Duration 94 ms    Q-T Interval 384 ms    QTC Calculation (Bezet) 448 ms    Calculated P Axis 61 degrees    Calculated R Axis 76 degrees    Calculated T Axis 63 degrees    Diagnosis       Normal sinus rhythm  Normal ECG  Confirmed by Princess Ortiz ([id number]) on 3/20/2017 8:10:32 AM           Lab Results   Component Value Date/Time    WBC 9.1 02/11/2021 10:59 AM    HGB 14.5 02/11/2021 10:59 AM    HCT 41.7 02/11/2021 10:59 AM    PLATELET 181 02/11/2021 10:59 AM    MCV 92 02/11/2021 10:59 AM      Lab Results   Component Value Date/Time    Sodium 136 02/11/2021 10:59 AM    Potassium 4.6 02/11/2021 10:59 AM    Chloride 96 02/11/2021 10:59 AM    CO2 27 02/11/2021 10:59 AM    Anion gap 7 03/19/2017 09:02 PM    Glucose 94 02/11/2021 10:59 AM    BUN 14 02/11/2021 10:59 AM    Creatinine 0.99 02/11/2021 10:59 AM    BUN/Creatinine ratio 14 02/11/2021 10:59 AM    GFR est AA 93 02/11/2021 10:59 AM    GFR est non-AA 80 02/11/2021 10:59 AM    Calcium 9.5 02/11/2021 10:59 AM    Bilirubin, total 0.5 02/11/2021 10:59 AM    Alk. phosphatase 124 (H) 02/11/2021 10:59 AM    Protein, total 7.1 02/11/2021 10:59 AM    Albumin 4.4 02/11/2021 10:59 AM    Globulin 3.8 03/19/2017 09:02 PM    A-G Ratio 1.6 02/11/2021 10:59 AM    ALT (SGPT) 22 02/11/2021 10:59 AM      Lab Results   Component Value Date/Time    TSH 1.870 02/11/2021 10:59 AM    TSH 0.855 04/10/2019 10:49 AM     Echo 1/18/21  · LV: Estimated LVEF is 55 - 60%. Normal cavity size, systolic function (ejection fraction normal) and diastolic function. Mild concentric hypertrophy. Wall motion: normal.  · RV: Mildly dilated right ventricle. Pacing wire present  · RA: Moderately dilated right atrium. Assessment:           ICD-10-CM ICD-9-CM    1. SSS (sick sinus syndrome) ([facility])  I49.5 427.81    2. Syncope, unspecified syncope type  R55 780.2    3. Cardiac pacemaker in situ  Z95.0 V45.01    4. Old myocardial infarction  I25.2 412    5. PSVT (paroxysmal supraventricular tachycardia) ([facility])  I47.1 427.0      Orders Placed This Encounter    acetaminophen (Tylenol Arthritis Pain) 650 mg TbER     Sig: Take 650 mg by mouth every eight (8) hours. Plan:     Mr Brie Alanis is here for annual follow up and device check (s/p dual chamber PM).  He is54.6% AP and  <0.1% RVP. Battery remaining is  7 years. No events noted on device. Normotensive. Recent echo with normal EF.    During this visit,  the patient and I had a comprehensive discussion of device management using principles of shared decision making. we reviewed device therapy, including the potential risks and benefits of device management. These risks include death, myocardial infarction, stroke, cardiac perforation, vascular injury, injury, pacing induced cardiomyopathy, inappropriate shocks (defibrillator) and other less severe complications. The patient demonstrated a clear understanding of these issues during out discussion. Our plans, determined together after thorough consideration, are outlined else where in this note. Enrolled in remote monitoring and I will see him in 1 year. Addressed all patient questions and concerns at this visit. Continue medical management for hyperlipidemia. Discussed side effects, efficacy and good medication compliance with pt re: statin. Thank you for allowing me to participate in Jaden Siegel 's care.       Sherren Bogus, NP

## 2021-07-01 ENCOUNTER — TELEPHONE (OUTPATIENT)
Dept: INTERNAL MEDICINE CLINIC | Age: 66
End: 2021-07-01

## 2021-07-01 NOTE — TELEPHONE ENCOUNTER
Rad Fournier (Key: [place]) Rx #: [id number]  [facility] Aerosphere 160-9-4. 8MCG/ACT aerosol  [employer] ([employer] 4-Part NCPDP Electronic PA Form  Your request has been approved  PA Case: [id number], Status: Approved, Coverage Starts on: 7/1/2021 12:00:00 AM, Coverage Ends on: 7/1/2022 12:00:00 AM.

## 2021-07-30 ENCOUNTER — OFFICE VISIT (OUTPATIENT)
Dept: CARDIOLOGY CLINIC | Age: 66
End: 2021-07-30
Payer: MEDICAID

## 2021-07-30 VITALS
HEART RATE: 66 BPM | WEIGHT: 162.7 LBS | SYSTOLIC BLOOD PRESSURE: 110 MMHG | BODY MASS INDEX: 25.54 KG/M2 | HEIGHT: 67 IN | DIASTOLIC BLOOD PRESSURE: 62 MMHG | OXYGEN SATURATION: 93 % | RESPIRATION RATE: 18 BRPM

## 2021-07-30 DIAGNOSIS — I10 ESSENTIAL HYPERTENSION: ICD-10-CM

## 2021-07-30 DIAGNOSIS — Z95.0 CARDIAC PACEMAKER IN SITU: ICD-10-CM

## 2021-07-30 DIAGNOSIS — I49.5 SSS (SICK SINUS SYNDROME) (HCC): ICD-10-CM

## 2021-07-30 DIAGNOSIS — I25.2 OLD MYOCARDIAL INFARCTION: ICD-10-CM

## 2021-07-30 DIAGNOSIS — E78.5 DYSLIPIDEMIA: ICD-10-CM

## 2021-07-30 DIAGNOSIS — I25.10 CORONARY ARTERY DISEASE INVOLVING NATIVE CORONARY ARTERY OF NATIVE HEART WITHOUT ANGINA PECTORIS: Primary | ICD-10-CM

## 2021-07-30 PROCEDURE — 99214 OFFICE O/P EST MOD 30 MIN: CPT | Performed by: INTERNAL MEDICINE

## 2021-07-30 PROCEDURE — 93000 ELECTROCARDIOGRAM COMPLETE: CPT | Performed by: INTERNAL MEDICINE

## 2021-07-30 RX ORDER — BUDESONIDE, GLYCOPYRROLATE, AND FORMOTEROL FUMARATE 160; 9; 4.8 UG/1; UG/1; UG/1
AEROSOL, METERED RESPIRATORY (INHALATION)
COMMUNITY
Start: 2021-07-07 | End: 2022-07-21

## 2021-07-30 NOTE — LETTER
7/30/2021    Patient: Creta Lefort   YOB: 1955   Date of Visit: 7/30/2021     Birda Lipoma III, DO  [address]  [address]  [address] [id number]  Via [address] [facility] [address]    Dear Zayda Alberto, ,      Thank you for referring Mr. Benral Nader to [address] [place] [address] for evaluation. My notes for this consultation are attached. If you have questions, please do not hesitate to call me. I look forward to following your patient along with you.       Sincerely,    Naseem Jaeger MD

## 2021-07-30 NOTE — PROGRESS NOTES
1. Have you been to the ER, urgent care clinic since your last visit? Hospitalized since your last visit? No    2. Have you seen or consulted any other health care providers outside of the [address] [place] [address] since your last visit? Include any pap smears or colon screening. No    Chief Complaint   Patient presents with    Coronary Artery Disease     6 mo appt. No cardiac concerns.

## 2021-07-30 NOTE — PROGRESS NOTES
Subjective/HPI:     Ronny Iniguez is a 60 y.o. male is here for a f/u appt. Last seen in our office 1/8/20. He denies CP, SOB/ANGEL, orthopnea, PND, edema, dizziness, lightheadedness, or palpitations. He has not been smoking cigarettes for a year, thinks this has helped his breathing. Recent change in inhaler as well. PCP Provider  Naresh Arrington DO  Past Medical History:   Diagnosis Date    Acute myocardial infarction, unspecified site, episode of care unspecified 2/22/2013    CAD (coronary artery disease)     Chest pain 10/15/2014    COPD (chronic obstructive pulmonary disease) (HCC)     Coronary atherosclerosis of native coronary artery 2/22/2013    Exercise induced bronchospasm 2/22/2013    Hyperlipidemia     Hypertension     Hyperthyroidism     Old myocardial infarction 2/22/2013    Pacemaker     Tobacco use 5/5/2012      Past Surgical History:   Procedure Laterality Date    HX CATARACT REMOVAL Left 02/02/2016    HX CORONARY STENT PLACEMENT      HX HEART CATHETERIZATION      HX OTHER SURGICAL  12/26/2018    Pacemaker    HX PACEMAKER  12/26/2018    HX PTCA      ME CARDIAC SURG PROCEDURE UNLIST      stents may 2012      Allergies   Allergen Reactions    Bupropion Other (comments)     Urinary frequency and flatulence.     Chantix [Varenicline] Other (comments)     Trouble sleeping, soreness of mouth    Codeine Itching    Fluticasone Shortness of Breath and Swelling     possibly due to    Melatonin Myalgia    Nasonex [Mometasone] Other (comments)     swelling of throat and hands possibly due to      Family History   Problem Relation Age of Onset    Cancer Mother         lung cancer    Heart Disease Father     Thyroid Disease Brother     Thyroid Disease Maternal Aunt       Current Outpatient Medications   Medication Sig    Breztri Aerosphere 160-9-4.8 mcg/actuation HFAA 2 puffs BID    acetaminophen (Tylenol Arthritis Pain) 650 mg TbER Take 650 mg by mouth every eight (8) hours.  atorvastatin (LIPITOR) 40 mg tablet TAKE ONE TABLET BY MOUTH DAILY    hydroCHLOROthiazide (HYDRODIURIL) 12.5 mg tablet Take 1 Tab by mouth daily.  ibuprofen (MOTRIN) 600 mg tablet TAKE ONE TABLET BY MOUTH EVERY 12 HOURS AS NEEDED FOR PAIN    levothyroxine (SYNTHROID) 100 mcg tablet TAKE ONE TABLET BY MOUTH DAILY BEFORE BREAKFAST    albuterol (PROVENTIL HFA, VENTOLIN HFA, PROAIR HFA) 90 mcg/actuation inhaler INHALE TWO PUFFS BY MOUTH EVERY 6 HOURS AS NEEDED FOR WHEEZING    carvediloL (COREG) 6.25 mg tablet TAKE ONE TABLET BY MOUTH TWICE A DAY    losartan (COZAAR) 50 mg tablet TAKE TWO TABLETS BY MOUTH DAILY    nitroglycerin (NITROSTAT) 0.4 mg SL tablet 1 Tab by SubLINGual route every five (5) minutes as needed for Chest Pain.  inhalational spacing device 1 Each by Does Not Apply route as needed.  aspirin delayed-release 81 mg tablet Take 1 Tab by mouth daily.  Trelegy Ellipta 100-62.5-25 mcg inhaler INHALE ONE PUFF BY MOUTH DAILY (Patient not taking: Reported on 2021)     No current facility-administered medications for this visit.       Vitals:    21 0928   BP: 110/62   Pulse: 66   Resp: 18   SpO2: 93%   Weight: 162 lb 11.2 oz (73.8 kg)   Height: 5' 7\" (1.702 m)     Social History     Socioeconomic History    Marital status:      Spouse name: Not on file    Number of children: Not on file    Years of education: Not on file    Highest education level: Not on file   Occupational History    Not on file   Tobacco Use    Smoking status: Former Smoker     Packs/day: 1.00     Years: 45.00     Pack years: 45.00     Types: Cigarettes     Quit date: 2020     Years since quittin.1    Smokeless tobacco: Never Used   Vaping Use    Vaping Use: Never used   Substance and Sexual Activity    Alcohol use: No     Alcohol/week: 0.0 standard drinks    Drug use: Yes     Types: Marijuana     Comment: 1/4 ounce per week     Sexual activity: Not Currently Other Topics Concern    Not on file   Social History Narrative    Not on file     Social Determinants of Health     Financial Resource Strain:     Difficulty of Paying Living Expenses:    Food Insecurity:     Worried About Running Out of Food in the Last Year:     [address] in the Last Year:    Transportation Needs:     Lack of Transportation (Medical):  Lack of Transportation (Non-Medical):    Physical Activity:     Days of Exercise per Week:     Minutes of Exercise per Session:    Stress:     Feeling of Stress :    Social Connections:     Frequency of Communication with Friends and Family:     Frequency of Social Gatherings with Friends and Family:     Attends Confucianist Services:     Active Member of Clubs or Organizations:     Attends Club or Organization Meetings:     Marital Status:    Intimate Partner Violence:     Fear of Current or Ex-Partner:     Emotionally Abused:     Physically Abused:     Sexually Abused:        I have reviewed the nurses notes, vitals, problem list, allergy list, medical history, family, social history and medications. Review of Symptoms:    General: Pt denies excessive weight gain or loss. Pt is able to conduct ADL's  HEENT: Denies blurred vision, headaches, epistaxis and difficulty swallowing. Respiratory: Denies shortness of breath, ANGEL, wheezing or stridor. Cardiovascular: Denies precordial pain, palpitations, edema or PND  Gastrointestinal: Denies poor appetite, indigestion, abdominal pain or blood in stool  Musculoskeletal: Denies pain or swelling from muscles or joints  Neurologic: denies Syncope  Skin: Denies rash, itching or texture change. Physical Exam:      General: Well developed, in no acute distress, cooperative and alert  HEENT: No carotid bruits, no JVD, trach is midline. Neck Supple, PEERL, EOM intact. Heart:  Normal S1/S2 negative S3 or S4.  Regular, no murmur, gallop or rub.   Respiratory: Bilateral expiratory wheezing  Abdomen:   Soft, non-tender, no masses, bowel sounds are active.   Extremities:  No edema, normal cap refill, no cyanosis, atraumatic. Neuro: A&Ox3, speech clear, gait stable. Skin: Skin color is normal. No rashes or lesions. Non diaphoretic  Vascular: 2+ pulses symmetric in all extremities    Cardiographics    ECG:Sinus  Rhythm HR 66  -Inferior infarct       Results for orders placed or performed in visit on 11/26/18   CARDIAC HOLTER MONITOR, 24 HOURS    Narrative    ECG Monitor/24 hours, Complete    Reason for Holter Monitor  SYNCOPE    Heartbeat    Slowest 43  Average 60  Fastest  94        Results:   Underlying Rhythm: Normal sinus rhythm      Atrial Arrhythmias: severe bradycardia, PSVT         AV Conduction: normal    Ventricular Arrhythmias: premature ventricular contractions; occasional     ST Segment Analysis:normal     Symptom Correlation:  None reported    Comment:   Sinus rhythm with PSVT and episodes of profound bradycardia to 40 bpm. Clinical correlation advised.      Tona Jacob MD, Lilian Velazquez      Results for orders placed or performed during the hospital encounter of 03/19/17   EKG, 12 LEAD, INITIAL   Result Value Ref Range    Ventricular Rate 82 BPM    Atrial Rate 82 BPM    P-R Interval 136 ms    QRS Duration 94 ms    Q-T Interval 384 ms    QTC Calculation (Bezet) 448 ms    Calculated P Axis 61 degrees    Calculated R Axis 76 degrees    Calculated T Axis 63 degrees    Diagnosis       Normal sinus rhythm  Normal ECG  Confirmed by Eduarda Ross ([id number]) on 3/20/2017 8:10:32 AM           Cardiology Labs:  Lab Results   Component Value Date/Time    Cholesterol, total 134 02/11/2021 10:59 AM    HDL Cholesterol 41 02/11/2021 10:59 AM    LDL, calculated 69 02/11/2021 10:59 AM    LDL, calculated 92 10/10/2019 10:52 AM    Triglyceride 136 02/11/2021 10:59 AM    CHOL/HDL Ratio 2.7 06/02/2015 04:16 AM       Lab Results   Component Value Date/Time    Sodium 136 02/11/2021 10:59 AM Potassium 4.6 2021 10:59 AM    Chloride 96 2021 10:59 AM    CO2 27 2021 10:59 AM    Anion gap 7 2017 09:02 PM    Glucose 94 2021 10:59 AM    BUN 14 2021 10:59 AM    Creatinine 0.99 2021 10:59 AM    BUN/Creatinine ratio 14 2021 10:59 AM    GFR est AA 93 2021 10:59 AM    GFR est non-AA 80 2021 10:59 AM    Calcium 9.5 2021 10:59 AM    Bilirubin, total 0.5 2021 10:59 AM    Alk. phosphatase 124 (H) 2021 10:59 AM    Protein, total 7.1 2021 10:59 AM    Albumin 4.4 2021 10:59 AM    Globulin 3.8 2017 09:02 PM    A-G Ratio 1.6 2021 10:59 AM    ALT (SGPT) 22 2021 10:59 AM             Assessment:     Diagnoses and all orders for this visit:    1. Coronary artery disease involving native coronary artery of native heart without angina pectoris  -     AMB POC EKG ROUTINE W/ 12 LEADS, INTER & REP    2. Essential hypertension    3. Dyslipidemia    4. SSS (sick sinus syndrome) (HCC)    5. Cardiac pacemaker in situ    6. Old myocardial infarction        ICD-10-CM ICD-9-CM    1. Coronary artery disease involving native coronary artery of native heart without angina pectoris  I25.10 414.01 AMB POC EKG ROUTINE W/ 12 LEADS, INTER & REP   2. Essential hypertension  I10 401.9    3. Dyslipidemia  E78.5 272.4    4. SSS (sick sinus syndrome) (HCC)  I49.5 427.81    5. Cardiac pacemaker in situ  Z95.0 V45.01    6. Old myocardial infarction  I25.2 412      Orders Placed This Encounter    AMB POC EKG ROUTINE W/ 12 LEADS, INTER & REP     Order Specific Question:   Reason for Exam:     Answer:   routine    Breztri Aerosphere 160-9-4.8 mcg/actuation HFAA     Si puffs BID        Plan:     CAD:  Hx Cath in 2018 showed nonobstructive disease. Echo 21 EF 55-60%, mod RA, no valvular disease of significance. EKG SR without significant changes noted c/w EKG 21. He denies angina or anginal equivalent symptoms today.    Continue BB, statin, and ASA    HTN: Controlled with current therapy-HCTZ 12.5, Coreg 6.25mg bid, and Losartan 100mg. creatinine 0.99 2/11/21. Hyperlipidemia: 2/11/21 LDL 69 On Lipitor 40mg after not at goal on Pravastatin. Labs managed by PCP. SSS with pacemaker: device interrogation 6/30/21 with appropriate function. F/U with EP per their instructions. COPD: On inhalers, followed by PCP     F/U in 1 year with Dr Jazmin Gunderson, sooner prn.

## 2021-08-12 ENCOUNTER — OFFICE VISIT (OUTPATIENT)
Dept: INTERNAL MEDICINE CLINIC | Age: 66
End: 2021-08-12
Payer: MEDICAID

## 2021-08-12 VITALS
HEART RATE: 66 BPM | TEMPERATURE: 97.9 F | DIASTOLIC BLOOD PRESSURE: 66 MMHG | WEIGHT: 163 LBS | HEIGHT: 67 IN | BODY MASS INDEX: 25.58 KG/M2 | OXYGEN SATURATION: 95 % | SYSTOLIC BLOOD PRESSURE: 109 MMHG | RESPIRATION RATE: 16 BRPM

## 2021-08-12 DIAGNOSIS — I10 ESSENTIAL HYPERTENSION: ICD-10-CM

## 2021-08-12 DIAGNOSIS — E03.9 ACQUIRED HYPOTHYROIDISM: ICD-10-CM

## 2021-08-12 DIAGNOSIS — Z87.891 PERSONAL HISTORY OF TOBACCO USE, PRESENTING HAZARDS TO HEALTH: ICD-10-CM

## 2021-08-12 DIAGNOSIS — J44.9 CHRONIC OBSTRUCTIVE PULMONARY DISEASE, UNSPECIFIED COPD TYPE (HCC): Primary | ICD-10-CM

## 2021-08-12 DIAGNOSIS — Z12.11 SCREEN FOR COLON CANCER: ICD-10-CM

## 2021-08-12 DIAGNOSIS — F41.0 PANIC ATTACKS: ICD-10-CM

## 2021-08-12 DIAGNOSIS — I25.118 CORONARY ARTERY DISEASE OF NATIVE ARTERY OF NATIVE HEART WITH STABLE ANGINA PECTORIS (HCC): ICD-10-CM

## 2021-08-12 DIAGNOSIS — Z87.891 EX-SMOKER: ICD-10-CM

## 2021-08-12 PROCEDURE — G0296 VISIT TO DETERM LDCT ELIG: HCPCS | Performed by: INTERNAL MEDICINE

## 2021-08-12 RX ORDER — ALPRAZOLAM 0.25 MG/1
TABLET ORAL
Qty: 5 TABLET | Refills: 0 | Status: SHIPPED | OUTPATIENT
Start: 2021-08-12 | End: 2022-02-15

## 2021-08-12 NOTE — PATIENT INSTRUCTIONS
Colon Cancer Screening: Care Instructions  Your Care Instructions     Colorectal cancer occurs in the colon or rectum. That's the lower part of your digestive system. It is the second-leading cause of cancer deaths in the [place]. It often starts with small growths called polyps in the colon or rectum. Polyps are usually found with screening tests. Depending on the type of test, any polyps found may be removed during the tests. Colorectal cancer usually does not cause symptoms at first. But regular tests can help find it early, before it spreads and becomes harder to treat. Your risk for colorectal cancer gets higher as you get older. Some experts say that adults should start regular screening at age 48 and stop at age 76. Others say to start before age 48 or continue after age 76. Talk with your doctor about your risk and when to start and stop screening. You may have one of several tests. Follow-up care is a key part of your treatment and safety. Be sure to make and go to all appointments, and call your doctor if you are having problems. It's also a good idea to know your test results and keep a list of the medicines you take. What are the main screening tests for colon cancer? The screening tests are:  Stool tests. These include the guaiac fecal occult blood test (gFOBT), the fecal immunochemical test (FIT), and the combined fecal immunochemical test and stool DNA test (FIT-DNA). These tests check stool samples for signs of cancer. If your test is positive, you will need to have a colonoscopy. Sigmoidoscopy. This test lets your doctor look at the lining of your rectum and the lowest part of your colon. Your doctor uses a lighted tube called a sigmoidoscope. This test can't find cancers or polyps in the upper part of your colon. In some cases, polyps that are found can be removed.  But if your doctor finds polyps, you will need to have a colonoscopy to check the upper part of your colon.  Colonoscopy. This test lets your doctor look at the lining of your rectum and your entire colon. The doctor uses a thin, flexible tool called a colonoscope. It can also be used to remove polyps or get a tissue sample (biopsy). A less common test is CT colonography (CTC). It's also called virtual colonoscopy. Who should be screened for colorectal cancer? Your risk for colorectal cancer gets higher as you get older. Some experts say that adults should start regular screening at age 48 and stop at age 76. Others say to start before age 48 or continue after age 76. Talk with your doctor about your risk and when to start and stop screening. How often you need screening depends on the type of test you get:  Stool tests. Every 1 or 2 years for FIT or gFOBT. Every 3 years for sDNA, also called FIT-DNA. Tests that look inside the colon. Every 5 or 10 years for sigmoidoscopy. Every 5 years for CT colonography (virtual colonoscopy). Every 10 years for colonoscopy. Experts agree that people at higher risk may need to be tested sooner. This includes people who have a strong family history of colon cancer. Talk to your doctor about which test is best for you and when to be tested. When should you call for help? Watch closely for changes in your health, and be sure to contact your doctor if:    · You have any changes in your bowel habits.     · You have any problems. Where can you learn more? Go to http://www.gray.com/  Enter M541 in the search box to learn more about \"Colon Cancer Screening: Care Instructions. \"  Current as of: December 17, 2020               Content Version: 12.8  © 2006-2021 [employer]. Care instructions adapted under license by [employer] (which disclaims liability or warranty for this information).  If you have questions about a medical condition or this instruction, always ask your healthcare professional. [employer] disclaims any warranty or liability for your use of this information.

## 2021-08-12 NOTE — PROGRESS NOTES
Estefani Santiago is a 60 y.o. male who presents for evaluation of routine follow up. Last seen by me feb 11, 2021. He has done well since then. Remains tobacco free, though does smoke MJ now. Insurance made him switch from trelegy to breztri, and he likes the 2nd dose in the evening. Frustrated that his son and exwife won't get covid vaccines.       ROS:  Constitutional: negative for fevers, chills, anorexia and weight loss  Eyes:   negative for visual disturbance and irritation  ENT:   negative for tinnitus,sore throat,nasal congestion,ear pain,hoarseness  Respiratory:  negative for cough, hemoptysis, dyspnea,wheezing  CV:   negative for chest pain, palpitations, lower extremity edema  GI:   negative for nausea, vomiting, diarrhea, abdominal pain,melena  Genitourinary: negative for frequency, dysuria and hematuria  Musculoskel: negative for myalgias, arthralgias, back pain, muscle weakness, joint pain  Neurological:  negative for headaches, dizziness, focal weakness, numbness  Psychiatric:     Negative for depression or anxiety      Past Medical History:   Diagnosis Date    Acute myocardial infarction, unspecified site, episode of care unspecified 2/22/2013    CAD (coronary artery disease)     Chest pain 10/15/2014    COPD [facility]chronic obstructive pulmonary disease) ([facility] [address]     Coronary atherosclerosis of native coronary artery 2/22/2013    Exercise induced bronchospasm 2/22/2013    Hyperlipidemia     Hypertension     Hyperthyroidism     Old myocardial infarction 2/22/2013    Pacemaker     Tobacco use 5/5/2012       Past Surgical History:   Procedure Laterality Date    HX CATARACT REMOVAL Left 02/02/2016    HX CORONARY STENT PLACEMENT      HX HEART CATHETERIZATION      HX OTHER SURGICAL  12/26/2018    Pacemaker    HX PACEMAKER  12/26/2018    HX PTCA      [place] CARDIAC SURG PROCEDURE UNLIST      stents may 2012        Family History   Problem Relation Age of Onset    Cancer Mother         lung cancer    Heart Disease Father     Thyroid Disease Brother     Thyroid Disease Maternal Aunt        Social History     Socioeconomic History    Marital status:      Spouse name: Not on file    Number of children: Not on file    Years of education: Not on file    Highest education level: Not on file   Occupational History    Not on file   Tobacco Use    Smoking status: Former Smoker     Packs/day: 1.00     Years: 45.00     Pack years: 45.00     Types: Cigarettes     Quit date: 2020     Years since quittin.2    Smokeless tobacco: Never Used   Vaping Use    Vaping Use: Never used   Substance and Sexual Activity    Alcohol use: No     Alcohol/week: 0.0 standard drinks    Drug use: Yes     Types: Marijuana     Comment: 1/4 ounce per week     Sexual activity: Not Currently   Other Topics Concern    Not on file   Social History Narrative    Not on file     Social Determinants of Health     Financial Resource Strain:     Difficulty of Paying Living Expenses:    Food Insecurity:     Worried About Running Out of Food in the Last Year:     Ran Out of Food in the Last Year:    Transportation Needs:     Lack of Transportation (Medical):      Lack of Transportation (Non-Medical):    Physical Activity:     Days of Exercise per Week:     Minutes of Exercise per Session:    Stress:     Feeling of Stress :    Social Connections:     Frequency of Communication with Friends and Family:     Frequency of Social Gatherings with Friends and Family:     Attends Catholic Services:     Active Member of Clubs or Organizations:     Attends Club or Organization Meetings:     Marital Status:    Intimate Partner Violence:     Fear of Current or Ex-Partner:     Emotionally Abused:     Physically Abused:     Sexually Abused:             Visit Vitals  /66 (BP 1 Location: Left upper arm, BP Patient Position: Sitting)   Pulse 66   Temp 97.9 °F (36.6 °C) (Temporal)   Resp 16   Ht 5' 7\" (1.702 m) Wt 163 lb (73.9 kg)   SpO2 95%   BMI 25.53 kg/m²       Physical Examination:   General - Well appearing male  HEENT - PERRL, TM no erythema/opacification, normal nasal turbinates, no oropharyngeal erythema or exudate, MMM  Neck - supple, no bruits, no thyroidomegaly, no lymphadenopathy  Pulm - clear to auscultation bilaterally  Cardio - RRR, normal S1 S2, no murmur  Abd - soft, nontender, no masses, no HSM  Extrem - no edema, +2 distal pulses  Neuro-  No focal deficits, CN intact     Assessment/Plan:    1. Copd--continue breztri  2. Ex smoker--LDCT chest ordered. He asks for xanax to take prior to test  3.  htn--controlled with hctz, coreg, cozaar  4. Cad with stents--doing well with asa, coreg, cozaar  5.  predm--last a1c 6.0, weight down 5 lbs  6. Hypothyroid--on synthroid  7.  hyperlipids--on lipitor  8. Screen colon cancer--fit card given, no interest in colonoscopy    rtc 6 months for 30 min welcome to medicare visit. [address] III, DO            Discussed with patient current guidelines for screening for lung cancer. Current recommendations are to obtain yearly screening LDCT yearly for age 46-80, or until smoke free for 15 years. Patient has 55 pack year history of cigarette smoking and currently quit smoking 2019. Discussed with patient risks and benefits of screening, including over-diagnosis, false positive rate, and total radiation exposure. Patient currently exhibits no signs or symptoms suggestive of lung cancer. Discussed with patient importance of compliance with yearly annual lung cancer screenings and willingness to undergo diagnosis and treatment if screening scan is positive. In addition, patient was counseled regarding (remaining smoke free/total smoking cessation).

## 2021-08-23 ENCOUNTER — HOSPITAL ENCOUNTER (OUTPATIENT)
Dept: CT IMAGING | Age: 66
Discharge: HOME | End: 2021-08-23
Attending: INTERNAL MEDICINE
Payer: MEDICAID

## 2021-08-23 DIAGNOSIS — Z87.891 PERSONAL HISTORY OF TOBACCO USE, PRESENTING HAZARDS TO HEALTH: ICD-10-CM

## 2021-08-23 PROCEDURE — 71271 CT THORAX LUNG CANCER SCR C-: CPT

## 2021-08-23 NOTE — PROGRESS NOTES
CT shows a few pulmonary nodules and emphysema. Will do another CT scan in 6 months.   Will order at next visit with me in Feb.

## 2021-08-24 LAB — HEMOCCULT STL QL IA: NEGATIVE

## 2021-08-24 NOTE — PROGRESS NOTES
Called, spoke to pt. Two identifiers confirmed. Pt notified of results/recommendations per Dr. Musa Peters. Pt verbalized understanding of information discussed w/ no further questions at this time. CT shows a few pulmonary nodules and emphysema. Will do another CT scan in 6 months.   Will order at next visit with me in Feb.

## 2021-09-20 RX ORDER — ATORVASTATIN CALCIUM 40 MG/1
TABLET, FILM COATED ORAL
Qty: 30 TABLET | Refills: 5 | Status: SHIPPED | OUTPATIENT
Start: 2021-09-20 | End: 2022-03-15

## 2021-10-04 ENCOUNTER — OFFICE VISIT (OUTPATIENT)
Dept: CARDIOLOGY CLINIC | Age: 66
End: 2021-10-04
Payer: MEDICARE

## 2021-10-04 DIAGNOSIS — Z95.0 CARDIAC PACEMAKER IN SITU: Primary | ICD-10-CM

## 2021-10-04 DIAGNOSIS — I49.5 SSS (SICK SINUS SYNDROME) (HCC): ICD-10-CM

## 2021-10-04 PROCEDURE — 93296 REM INTERROG EVL PM/IDS: CPT | Performed by: INTERNAL MEDICINE

## 2021-10-04 PROCEDURE — 93294 REM INTERROG EVL PM/LDLS PM: CPT | Performed by: INTERNAL MEDICINE

## 2021-10-26 RX ORDER — CARVEDILOL 6.25 MG/1
TABLET ORAL
Qty: 60 TABLET | Refills: 10 | Status: SHIPPED | OUTPATIENT
Start: 2021-10-26

## 2022-01-10 ENCOUNTER — OFFICE VISIT (OUTPATIENT)
Dept: CARDIOLOGY CLINIC | Age: 67
End: 2022-01-10
Payer: MEDICARE

## 2022-01-10 DIAGNOSIS — I49.5 SSS (SICK SINUS SYNDROME) (HCC): ICD-10-CM

## 2022-01-10 DIAGNOSIS — Z95.0 CARDIAC PACEMAKER IN SITU: Primary | ICD-10-CM

## 2022-01-10 PROCEDURE — 93296 REM INTERROG EVL PM/IDS: CPT | Performed by: INTERNAL MEDICINE

## 2022-01-10 PROCEDURE — 93294 REM INTERROG EVL PM/LDLS PM: CPT | Performed by: INTERNAL MEDICINE

## 2022-01-17 RX ORDER — ALBUTEROL SULFATE 90 UG/1
AEROSOL, METERED RESPIRATORY (INHALATION)
Qty: 8.5 G | Refills: 2 | Status: SHIPPED | OUTPATIENT
Start: 2022-01-17

## 2022-02-15 ENCOUNTER — OFFICE VISIT (OUTPATIENT)
Dept: INTERNAL MEDICINE CLINIC | Age: 67
End: 2022-02-15
Payer: MEDICARE

## 2022-02-15 VITALS
SYSTOLIC BLOOD PRESSURE: 144 MMHG | TEMPERATURE: 97 F | DIASTOLIC BLOOD PRESSURE: 79 MMHG | BODY MASS INDEX: 25.9 KG/M2 | HEART RATE: 74 BPM | WEIGHT: 165 LBS | HEIGHT: 67 IN | RESPIRATION RATE: 20 BRPM | OXYGEN SATURATION: 95 %

## 2022-02-15 DIAGNOSIS — I49.5 SSS (SICK SINUS SYNDROME) (HCC): ICD-10-CM

## 2022-02-15 DIAGNOSIS — I10 ESSENTIAL HYPERTENSION: ICD-10-CM

## 2022-02-15 DIAGNOSIS — Z87.891 PERSONAL HISTORY OF TOBACCO USE, PRESENTING HAZARDS TO HEALTH: ICD-10-CM

## 2022-02-15 DIAGNOSIS — R73.03 PREDIABETES: ICD-10-CM

## 2022-02-15 DIAGNOSIS — E03.9 ACQUIRED HYPOTHYROIDISM: ICD-10-CM

## 2022-02-15 DIAGNOSIS — Z00.00 WELCOME TO MEDICARE PREVENTIVE VISIT: Primary | ICD-10-CM

## 2022-02-15 DIAGNOSIS — J44.9 CHRONIC OBSTRUCTIVE PULMONARY DISEASE, UNSPECIFIED COPD TYPE (HCC): ICD-10-CM

## 2022-02-15 DIAGNOSIS — I25.118 CORONARY ARTERY DISEASE OF NATIVE ARTERY OF NATIVE HEART WITH STABLE ANGINA PECTORIS (HCC): ICD-10-CM

## 2022-02-15 DIAGNOSIS — E78.2 MIXED HYPERLIPIDEMIA: ICD-10-CM

## 2022-02-15 DIAGNOSIS — N40.0 BENIGN PROSTATIC HYPERPLASIA, UNSPECIFIED WHETHER LOWER URINARY TRACT SYMPTOMS PRESENT: ICD-10-CM

## 2022-02-15 PROCEDURE — G0402 INITIAL PREVENTIVE EXAM: HCPCS | Performed by: INTERNAL MEDICINE

## 2022-02-15 PROCEDURE — 3017F COLORECTAL CA SCREEN DOC REV: CPT | Performed by: INTERNAL MEDICINE

## 2022-02-15 PROCEDURE — G8753 SYS BP > OR = 140: HCPCS | Performed by: INTERNAL MEDICINE

## 2022-02-15 PROCEDURE — G8754 DIAS BP LESS 90: HCPCS | Performed by: INTERNAL MEDICINE

## 2022-02-15 PROCEDURE — G8510 SCR DEP NEG, NO PLAN REQD: HCPCS | Performed by: INTERNAL MEDICINE

## 2022-02-15 PROCEDURE — 1101F PT FALLS ASSESS-DOCD LE1/YR: CPT | Performed by: INTERNAL MEDICINE

## 2022-02-15 PROCEDURE — G8536 NO DOC ELDER MAL SCRN: HCPCS | Performed by: INTERNAL MEDICINE

## 2022-02-15 PROCEDURE — G8419 CALC BMI OUT NRM PARAM NOF/U: HCPCS | Performed by: INTERNAL MEDICINE

## 2022-02-15 PROCEDURE — G8427 DOCREV CUR MEDS BY ELIG CLIN: HCPCS | Performed by: INTERNAL MEDICINE

## 2022-02-15 PROCEDURE — G0403 EKG FOR INITIAL PREVENT EXAM: HCPCS | Performed by: INTERNAL MEDICINE

## 2022-02-15 PROCEDURE — 99213 OFFICE O/P EST LOW 20 MIN: CPT | Performed by: INTERNAL MEDICINE

## 2022-02-15 RX ORDER — TETANUS TOXOID, REDUCED DIPHTHERIA TOXOID AND ACELLULAR PERTUSSIS VACCINE, ADSORBED 5; 2.5; 8; 8; 2.5 [IU]/.5ML; [IU]/.5ML; UG/.5ML; UG/.5ML; UG/.5ML
0.5 SUSPENSION INTRAMUSCULAR ONCE
Qty: 0.5 ML | Refills: 0 | Status: SHIPPED | OUTPATIENT
Start: 2022-02-15 | End: 2022-02-15

## 2022-02-15 RX ORDER — ZOSTER VACCINE RECOMBINANT, ADJUVANTED 50 MCG/0.5
0.5 KIT INTRAMUSCULAR ONCE
Qty: 0.5 ML | Refills: 0 | Status: SHIPPED | OUTPATIENT
Start: 2022-02-15 | End: 2022-02-15

## 2022-02-15 NOTE — PATIENT INSTRUCTIONS
Medicare Wellness Visit, Male    The best way to live healthy is to have a lifestyle where you eat a well-balanced diet, exercise regularly, limit alcohol use, and quit all forms of tobacco/nicotine, if applicable. Regular preventive services are another way to keep healthy. Preventive services (vaccines, screening tests, monitoring & exams) can help personalize your care plan, which helps you manage your own care. Screening tests can find health problems at the earliest stages, when they are easiest to treat. [facility] follows the current, evidence-based guidelines published by the [facility] ([facility]) when recommending preventive services for our patients. Because we follow these guidelines, sometimes recommendations change over time as research supports it. (For example, a prostate screening blood test is no longer routinely recommended for men with no symptoms). Of course, you and your doctor may decide to screen more often for some diseases, based on your risk and co-morbidities (chronic disease you are already diagnosed with). Preventive services for you include:  - Medicare offers their members a free annual wellness visit, which is time for you and your primary care provider to discuss and plan for your preventive service needs. Take advantage of this benefit every year!  -All adults over age 72 should receive the recommended pneumonia vaccines. Current USPSTF guidelines recommend a series of two vaccines for the best pneumonia protection.   -All adults should have a flu vaccine yearly and tetanus vaccine every 10 years.  -All adults age 48 and older should receive the shingles vaccines (series of two vaccines).        -All adults age 38-68 who are overweight should have a diabetes screening test once every three years.   -Other screening tests & preventive services for persons with diabetes include: an eye exam to screen for diabetic retinopathy, a kidney function test, a foot exam, and stricter control over your cholesterol.   -Cardiovascular screening for adults with routine risk involves an electrocardiogram (ECG) at intervals determined by the provider.   -Colorectal cancer screening should be done for adults age 54-65 with no increased risk factors for colorectal cancer. There are a number of acceptable methods of screening for this type of cancer. Each test has its own benefits and drawbacks. Discuss with your provider what is most appropriate for you during your annual wellness visit. The different tests include: colonoscopy (considered the best screening method), a fecal occult blood test, a fecal DNA test, and sigmoidoscopy.  -All adults [place] between [facility] should be screened once for Hepatitis C.  -An Abdominal Aortic Aneurysm (AAA) Screening is recommended for men age 73-68 who has ever smoked in their lifetime. Here is a list of your current Health Maintenance items (your personalized list of preventive services) with a due date:  Health Maintenance Due   Topic Date Due    DTaP/Tdap/Td  (1 - Tdap) Never done    Shingles Vaccine (2 of 2) 12/10/2020    AAA Screening  Never done    Cholesterol Test   02/11/2022       Try to follow bp at home few times each week. Notify me if consistently above 135/85.

## 2022-02-15 NOTE — PROGRESS NOTES
Yuliet Jang is a 73 y.o. male who presents for evaluation of welcome to medicare visit. Last seen by me aug 12, 2021. Had covid in early jan 2022. Was sick for about a week, but back to normal now. Did not take any of his meds yet this am.  Has not been checking bp at home. Numerous easy bruises from asa. Smokes MJ regularly.       ROS:  Constitutional: negative for fevers, chills, anorexia and weight loss  Eyes:   negative for visual disturbance and irritation  ENT:   negative for tinnitus,sore throat,nasal congestion,ear pain,hoarseness  Respiratory:  negative for cough, hemoptysis, dyspnea,wheezing  CV:   negative for chest pain, palpitations, lower extremity edema  GI:   negative for nausea, vomiting, diarrhea, abdominal pain,melena  Genitourinary: negative for frequency, dysuria and hematuria  Musculoskel: negative for myalgias, arthralgias, back pain, muscle weakness, joint pain  Neurological:  negative for headaches, dizziness, focal weakness, numbness  Psychiatric:     Negative for depression or anxiety      Past Medical History:   Diagnosis Date    Acute myocardial infarction, unspecified site, episode of care unspecified 2/22/2013    CAD (coronary artery disease)     Chest pain 10/15/2014    COPD [facility] obstructive pulmonary disease) ([facility] [address]     Coronary atherosclerosis of native coronary artery 2/22/2013    Exercise induced bronchospasm 2/22/2013    Hyperlipidemia     Hypertension     Hyperthyroidism     Old myocardial infarction 2/22/2013    Pacemaker     Tobacco use 5/5/2012       Past Surgical History:   Procedure Laterality Date    HX CATARACT REMOVAL Left 02/02/2016    HX CORONARY STENT PLACEMENT      HX HEART CATHETERIZATION      HX OTHER SURGICAL  12/26/2018    Pacemaker    HX PACEMAKER  12/26/2018    HX PTCA      [place] CARDIAC SURG PROCEDURE UNLIST      stents may 2012        Family History   Problem Relation Age of Onset    Cancer Mother         lung cancer    Heart Disease Father     Thyroid Disease Brother     Thyroid Disease Maternal Aunt        Social History     Socioeconomic History    Marital status:      Spouse name: Not on file    Number of children: Not on file    Years of education: Not on file    Highest education level: Not on file   Occupational History    Not on file   Tobacco Use    Smoking status: Former Smoker     Packs/day: 1.00     Years: 45.00     Pack years: 45.00     Types: Cigarettes     Quit date: 2020     Years since quittin.7    Smokeless tobacco: Never Used   Vaping Use    Vaping Use: Never used   Substance and Sexual Activity    Alcohol use: No     Alcohol/week: 0.0 standard drinks    Drug use: Yes     Types: Marijuana     Comment: 1/4 ounce per week     Sexual activity: Not Currently   Other Topics Concern    Not on file   Social History Narrative    Not on file     Social Determinants of Health     Financial Resource Strain:     Difficulty of Paying Living Expenses: Not on file   Food Insecurity:     Worried About [address] [employer] in the Last Year: Not on file    [place] of Food in the Last Year: Not on file   Transportation Needs:     Lack of Transportation (Medical): Not on file    Lack of Transportation (Non-Medical):  Not on file   Physical Activity:     Days of Exercise per Week: Not on file    Minutes of Exercise per Session: Not on file   Stress:     Feeling of Stress : Not on file   Social Connections:     Frequency of Communication with Friends and Family: Not on file    Frequency of Social Gatherings with Friends and Family: Not on file    Attends Congregation Services: Not on file    Active Member of Clubs or Organizations: Not on file    Attends Club or Organization Meetings: Not on file    Marital Status: Not on file   Intimate Partner Violence:     Fear of Current or Ex-Partner: Not on file    Emotionally Abused: Not on file    Physically Abused: Not on file    Sexually Abused: Not on file   Housing Stability:     Unable to Pay for Housing in the Last Year: Not on file    Number of Places Lived in the Last Year: Not on file    Unstable Housing in the Last Year: Not on file            Visit Vitals  BP (!) 144/79 (BP 1 Location: Left upper arm, BP Patient Position: Sitting)   Pulse 74   Temp 97 °F (36.1 °C) (Temporal)   Resp 20   Ht 5' 7\" (1.702 m)   Wt 165 lb (74.8 kg)   SpO2 95%   BMI 25.84 kg/m²       Physical Examination:   General - Well appearing male  HEENT - PERRL, TM no erythema/opacification, normal nasal turbinates, no oropharyngeal erythema or exudate, MMM  Neck - supple, no bruits, no thyroidomegaly, no lymphadenopathy  Pulm - clear to auscultation bilaterally  Cardio - RRR, normal S1 S2, no murmur  Abd - soft, nontender, no masses, no HSM  Extrem - no edema, +2 distal pulses  Neuro-  No focal deficits, CN intact     Assessment/Plan:    1. Copd--improved since switched to breztri  2. htn--continue hctz, cozaar, coreg. Asked to monitor at home. Notify me if consistently above 135/85  3. Cad with stents--on asa, bb, arb  4.  hyperlipids--on lipitor, check flp, cmp  5.  predm--check a1c, last was 6.0  6. Ex smoker--check abd ultrasound for AAA  7.  covid 19 infection, jan 2022--recovered  8. bph--check psa    ACP paperwork given. rx given for tdap and 2nd shingrix  rtc 6 months        Nerissa Midget III, DO            This is a \"Welcome to [employer]"  Initial Preventive Physical Examination (IPPE) providing Personalized Prevention Plan Services (Performed in the first 12 months of enrollment)    I have reviewed the patient's medical history in detail and updated the computerized patient record.        Assessment/Plan   Education and counseling provided:  Are appropriate based on today's review and evaluation  End-of-Life planning (with patient's consent)  Pneumococcal Vaccine  Influenza Vaccine  Prostate cancer screening tests (PSA, covered annually)  Screening for glaucoma    1. Welcome to Medicare preventive visit  -     AMB POC EKG ROUTINE W/ 12 LEADS, INTER & REP       Depression Risk Screen     3 most recent PHQ Screens 2/15/2022   Little interest or pleasure in doing things Not at all   Feeling down, depressed, irritable, or hopeless Not at all   Total Score PHQ 2 0       Alcohol & Drug Abuse Risk Screen    Do you average more than 1 drink per night or more than 7 drinks a week: No    In the past three months have you have had more than 4 drinks containing alcohol on one occasion: No          Functional Ability and Level of Safety    Diet: The patient is prescribed and does not follow the special diet. Would benefit from heart healthy. Hearing: Hearing is good. Vision Screening:  Vision is good. No exam data present      Activities of Daily Living: The home contains: grab bars  Patient does total self care      Ambulation: with no difficulty      Exercise level: minimally active. Fall Risk Screen:  Fall Risk Assessment, last 12 mths 2/15/2022   Able to walk? Yes   Fall in past 12 months? 0   Do you feel unsteady? 0   Are you worried about falling 0      Abuse Screen:  Patient is not abused. Lives alone. Screening EKG   EKG order placed: Yes    End of Life Planning   Advanced care planning directives were discussed with the patient and /or family/caregiver.      Health Maintenance Due     Health Maintenance Due   Topic Date Due    DTaP/Tdap/Td series (1 - Tdap) Never done    Shingrix Vaccine Age 49> (2 of 2) 12/10/2020    AAA Screening 73-69 YO Male Smoking Patients  Never done    Lipid Screen  02/11/2022       Patient Care Team   Patient Care Team:  Luke Zabala DO as PCP - General (Internal Medicine)  Luke Zabala DO as PCP - REHABILITATION [facility] Empaneled Provider  Mary Lorenzo MD (Cardiology)  Sha North., NP as Nurse Practitioner (Nurse Practitioner)  Demond Estevez MD as Physician (Cardiology)  Shayy Arce, NP as Nurse Practitioner (Cardiology)    History     Past Medical History:   Diagnosis Date    Acute myocardial infarction, unspecified site, episode of care unspecified 2/22/2013    CAD (coronary artery disease)     Chest pain 10/15/2014    COPD [facility]chronic obstructive pulmonary disease) ([facility] [address]     Coronary atherosclerosis of native coronary artery 2/22/2013    Exercise induced bronchospasm 2/22/2013    Hyperlipidemia     Hypertension     Hyperthyroidism     Old myocardial infarction 2/22/2013    Pacemaker     Tobacco use 5/5/2012      Past Surgical History:   Procedure Laterality Date    HX CATARACT REMOVAL Left 02/02/2016    HX CORONARY STENT PLACEMENT      HX HEART CATHETERIZATION      HX OTHER SURGICAL  12/26/2018    Pacemaker    HX PACEMAKER  12/26/2018    HX PTCA      [place] CARDIAC SURG PROCEDURE UNLIST      stents may 2012      Current Outpatient Medications   Medication Sig Dispense Refill    levothyroxine (SYNTHROID) 100 mcg tablet TAKE 1 TABLET BY MOUTH DAILY BEFORE BREAKFAST 90 Tablet 3    albuterol (PROVENTIL HFA, VENTOLIN HFA, PROAIR HFA) 90 mcg/actuation inhaler INHALE TWO PUFFS BY MOUTH EVERY 6 HOURS AS NEEDED FOR WHEEZING 8.5 g 2    carvediloL (COREG) 6.25 mg tablet TAKE ONE TABLET BY MOUTH TWICE A DAY 60 Tablet 10    losartan (COZAAR) 50 mg tablet TAKE TWO TABLETS BY MOUTH DAILY 180 Tablet 3    atorvastatin (LIPITOR) 40 mg tablet TAKE ONE TABLET BY MOUTH DAILY 30 Tablet 5    Breztri Aerosphere 160-9-4.8 mcg/actuation HFAA 2 puffs BID      acetaminophen (Tylenol Arthritis Pain) 650 mg TbER Take 650 mg by mouth every eight (8) hours.  hydroCHLOROthiazide (HYDRODIURIL) 12.5 mg tablet Take 1 Tab by mouth daily. 90 Tab 3    nitroglycerin (NITROSTAT) 0.4 mg SL tablet 1 Tab by SubLINGual route every five (5) minutes as needed for Chest Pain. 1 Bottle 11    inhalational spacing device 1 Each by Does Not Apply route as needed.  1 Device 0    aspirin delayed-release 81 mg tablet Take 1 Tab by mouth daily. 30 Tab 11     Allergies   Allergen Reactions    Bupropion Other (comments)     Urinary frequency and flatulence.     Chantix [Varenicline] Other (comments)     Trouble sleeping, soreness of mouth    Codeine Itching    Fluticasone Shortness of Breath and Swelling     possibly due to    Melatonin Myalgia    Nasonex [Mometasone] Other (comments)     swelling of throat and hands possibly due to       Family History   Problem Relation Age of Onset    Cancer Mother         lung cancer    Heart Disease Father     Thyroid Disease Brother     Thyroid Disease Maternal Aunt      Social History     Tobacco Use    Smoking status: Former Smoker     Packs/day: 1.00     Years: 45.00     Pack years: 45.00     Types: Cigarettes     Quit date: 2020     Years since quittin.7    Smokeless tobacco: Never Used   Substance Use Topics    Alcohol use: No     Alcohol/week: 0.0 standard drinks       Zahra Ho III, DO

## 2022-02-16 ENCOUNTER — TELEPHONE (OUTPATIENT)
Dept: INTERNAL MEDICINE CLINIC | Age: 67
End: 2022-02-16

## 2022-02-16 DIAGNOSIS — E03.9 ACQUIRED HYPOTHYROIDISM: ICD-10-CM

## 2022-02-16 DIAGNOSIS — E78.2 MIXED HYPERLIPIDEMIA: ICD-10-CM

## 2022-02-16 DIAGNOSIS — Z00.00 WELCOME TO MEDICARE PREVENTIVE VISIT: ICD-10-CM

## 2022-02-16 DIAGNOSIS — N40.0 BENIGN PROSTATIC HYPERPLASIA, UNSPECIFIED WHETHER LOWER URINARY TRACT SYMPTOMS PRESENT: ICD-10-CM

## 2022-02-16 DIAGNOSIS — R73.03 PREDIABETES: Primary | ICD-10-CM

## 2022-02-16 NOTE — TELEPHONE ENCOUNTER
Called patient. ID verified with Name and . Spoke with patient in regards to information received. Informed patient that new lab orders had been placed at this time. Patient states that he eileen return Friday for lab draw. No further actions required at this time.

## 2022-02-18 ENCOUNTER — LAB ONLY (OUTPATIENT)
Dept: INTERNAL MEDICINE CLINIC | Age: 67
End: 2022-02-18

## 2022-02-18 DIAGNOSIS — R73.03 PREDIABETES: ICD-10-CM

## 2022-02-18 DIAGNOSIS — N40.0 BENIGN PROSTATIC HYPERPLASIA, UNSPECIFIED WHETHER LOWER URINARY TRACT SYMPTOMS PRESENT: ICD-10-CM

## 2022-02-18 DIAGNOSIS — E03.9 ACQUIRED HYPOTHYROIDISM: ICD-10-CM

## 2022-02-18 DIAGNOSIS — E78.2 MIXED HYPERLIPIDEMIA: ICD-10-CM

## 2022-02-18 DIAGNOSIS — Z00.00 WELCOME TO MEDICARE PREVENTIVE VISIT: ICD-10-CM

## 2022-02-18 LAB
ALBUMIN SERPL-MCNC: 3.5 G/DL (ref 3.5–5)
ALBUMIN/GLOB SERPL: 1 {RATIO} (ref 1.1–2.2)
ALP SERPL-CCNC: 93 U/L (ref 45–117)
ALT SERPL-CCNC: 28 U/L (ref 12–78)
ANION GAP SERPL CALC-SCNC: 7 MMOL/L (ref 5–15)
AST SERPL-CCNC: 17 U/L (ref 15–37)
BASOPHILS # BLD: 0 K/UL (ref 0–0.1)
BASOPHILS NFR BLD: 0 % (ref 0–1)
BILIRUB SERPL-MCNC: 0.5 MG/DL (ref 0.2–1)
BUN SERPL-MCNC: 13 MG/DL (ref 6–20)
BUN/CREAT SERPL: 14 (ref 12–20)
CALCIUM SERPL-MCNC: 9 MG/DL (ref 8.5–10.1)
CHLORIDE SERPL-SCNC: 98 MMOL/L (ref 97–108)
CHOLEST SERPL-MCNC: 118 MG/DL
CO2 SERPL-SCNC: 27 MMOL/L (ref 21–32)
CREAT SERPL-MCNC: 0.94 MG/DL (ref 0.7–1.3)
DIFFERENTIAL METHOD BLD: ABNORMAL
EOSINOPHIL # BLD: 0.3 K/UL (ref 0–0.4)
EOSINOPHIL NFR BLD: 4 % (ref 0–7)
ERYTHROCYTE [DISTWIDTH] IN BLOOD BY AUTOMATED COUNT: 13.2 % (ref 11.5–14.5)
EST. AVERAGE GLUCOSE BLD GHB EST-MCNC: 126 MG/DL
GLOBULIN SER CALC-MCNC: 3.4 G/DL (ref 2–4)
GLUCOSE SERPL-MCNC: 97 MG/DL (ref 65–100)
HBA1C MFR BLD: 6 % (ref 4–5.6)
HCT VFR BLD AUTO: 41.9 % (ref 36.6–50.3)
HDLC SERPL-MCNC: 55 MG/DL
HDLC SERPL: 2.1 {RATIO} (ref 0–5)
HGB BLD-MCNC: 13.3 G/DL (ref 12.1–17)
IMM GRANULOCYTES # BLD AUTO: 0 K/UL (ref 0–0.04)
IMM GRANULOCYTES NFR BLD AUTO: 0 % (ref 0–0.5)
LDLC SERPL CALC-MCNC: 45.4 MG/DL (ref 0–100)
LYMPHOCYTES # BLD: 2.2 K/UL (ref 0.8–3.5)
LYMPHOCYTES NFR BLD: 31 % (ref 12–49)
MCH RBC QN AUTO: 31.7 PG (ref 26–34)
MCHC RBC AUTO-ENTMCNC: 31.7 G/DL (ref 30–36.5)
MCV RBC AUTO: 100 FL (ref 80–99)
MONOCYTES # BLD: 0.7 K/UL (ref 0–1)
MONOCYTES NFR BLD: 10 % (ref 5–13)
NEUTS SEG # BLD: 4 K/UL (ref 1.8–8)
NEUTS SEG NFR BLD: 55 % (ref 32–75)
NRBC # BLD: 0 K/UL (ref 0–0.01)
NRBC BLD-RTO: 0 PER 100 WBC
PLATELET # BLD AUTO: 175 K/UL (ref 150–400)
PMV BLD AUTO: 11.4 FL (ref 8.9–12.9)
POTASSIUM SERPL-SCNC: 4.5 MMOL/L (ref 3.5–5.1)
PROT SERPL-MCNC: 6.9 G/DL (ref 6.4–8.2)
PSA SERPL-MCNC: 0.7 NG/ML (ref 0.01–4)
RBC # BLD AUTO: 4.19 M/UL (ref 4.1–5.7)
SODIUM SERPL-SCNC: 132 MMOL/L (ref 136–145)
TRIGL SERPL-MCNC: 88 MG/DL (ref ?–150)
TSH SERPL DL<=0.05 MIU/L-ACNC: 0.51 UIU/ML (ref 0.36–3.74)
VLDLC SERPL CALC-MCNC: 17.6 MG/DL
WBC # BLD AUTO: 7.3 K/UL (ref 4.1–11.1)

## 2022-02-23 ENCOUNTER — HOSPITAL ENCOUNTER (OUTPATIENT)
Dept: ULTRASOUND IMAGING | Age: 67
Discharge: HOME | End: 2022-02-23
Attending: INTERNAL MEDICINE
Payer: MEDICARE

## 2022-02-23 DIAGNOSIS — Z87.891 PERSONAL HISTORY OF TOBACCO USE, PRESENTING HAZARDS TO HEALTH: ICD-10-CM

## 2022-02-23 PROCEDURE — 76706 US ABDL AORTA SCREEN AAA: CPT

## 2022-03-15 RX ORDER — ATORVASTATIN CALCIUM 40 MG/1
TABLET, FILM COATED ORAL
Qty: 30 TABLET | Refills: 5 | Status: SHIPPED | OUTPATIENT
Start: 2022-03-15

## 2022-03-19 PROBLEM — I49.5 SSS (SICK SINUS SYNDROME) (HCC): Status: ACTIVE | Noted: 2018-12-26

## 2022-03-20 PROBLEM — Z98.890 S/P CARDIAC CATH: Status: ACTIVE | Noted: 2018-12-06

## 2022-04-18 ENCOUNTER — OFFICE VISIT (OUTPATIENT)
Dept: CARDIOLOGY CLINIC | Age: 67
End: 2022-04-18
Payer: MEDICARE

## 2022-04-18 DIAGNOSIS — Z95.0 CARDIAC PACEMAKER IN SITU: Primary | ICD-10-CM

## 2022-04-18 DIAGNOSIS — I49.5 SSS (SICK SINUS SYNDROME) (HCC): ICD-10-CM

## 2022-04-18 PROCEDURE — 93296 REM INTERROG EVL PM/IDS: CPT | Performed by: INTERNAL MEDICINE

## 2022-04-18 PROCEDURE — 93294 REM INTERROG EVL PM/LDLS PM: CPT | Performed by: INTERNAL MEDICINE

## 2022-07-08 ENCOUNTER — TELEPHONE (OUTPATIENT)
Dept: INTERNAL MEDICINE CLINIC | Age: 67
End: 2022-07-08

## 2022-07-08 NOTE — TELEPHONE ENCOUNTER
PA started for [employer] Aerosphere 160-9-4.8mcg  [facility]). Need help? Call us at [phone]  Additional Information Required  The  is not the PA processor for this patient and medication combination. This writer is not able to proceed with PA. Will send to the Provider's  for assistance.   Signed By: Myrtle Bustillo LPN     July 8, 1135

## 2022-07-11 NOTE — TELEPHONE ENCOUNTER
Writer contacted patient to confirm insurance information. Patient states that he does have new insurance at this time. Member ID: [id number]  BIN: [id number]  PCN: [id number]  [place]: [facility]    Information submitted via CoverMyMeds at this time. PA states that inhaler is available without authorization at this time.

## 2022-07-21 RX ORDER — BUDESONIDE, GLYCOPYRROLATE, AND FORMOTEROL FUMARATE 160; 9; 4.8 UG/1; UG/1; UG/1
AEROSOL, METERED RESPIRATORY (INHALATION)
Qty: 32.1 G | Refills: 3 | Status: SHIPPED | OUTPATIENT
Start: 2022-07-21

## 2022-08-16 ENCOUNTER — OFFICE VISIT (OUTPATIENT)
Dept: INTERNAL MEDICINE CLINIC | Age: 67
End: 2022-08-16
Payer: MEDICARE

## 2022-08-16 VITALS
OXYGEN SATURATION: 95 % | HEIGHT: 67 IN | SYSTOLIC BLOOD PRESSURE: 146 MMHG | TEMPERATURE: 98.1 F | DIASTOLIC BLOOD PRESSURE: 80 MMHG | WEIGHT: 163.6 LBS | HEART RATE: 71 BPM | RESPIRATION RATE: 16 BRPM | BODY MASS INDEX: 25.68 KG/M2

## 2022-08-16 DIAGNOSIS — Z87.891 PERSONAL HISTORY OF TOBACCO USE, PRESENTING HAZARDS TO HEALTH: ICD-10-CM

## 2022-08-16 DIAGNOSIS — R35.0 BENIGN PROSTATIC HYPERPLASIA WITH URINARY FREQUENCY: Primary | ICD-10-CM

## 2022-08-16 DIAGNOSIS — E03.9 ACQUIRED HYPOTHYROIDISM: ICD-10-CM

## 2022-08-16 DIAGNOSIS — Z12.11 SCREEN FOR COLON CANCER: ICD-10-CM

## 2022-08-16 DIAGNOSIS — N40.1 BENIGN PROSTATIC HYPERPLASIA WITH URINARY FREQUENCY: Primary | ICD-10-CM

## 2022-08-16 DIAGNOSIS — I10 ESSENTIAL HYPERTENSION: ICD-10-CM

## 2022-08-16 DIAGNOSIS — E78.2 MIXED HYPERLIPIDEMIA: ICD-10-CM

## 2022-08-16 DIAGNOSIS — M54.16 LUMBAR RADICULOPATHY: ICD-10-CM

## 2022-08-16 DIAGNOSIS — J44.9 CHRONIC OBSTRUCTIVE PULMONARY DISEASE, UNSPECIFIED COPD TYPE (HCC): ICD-10-CM

## 2022-08-16 PROCEDURE — 99214 OFFICE O/P EST MOD 30 MIN: CPT | Performed by: INTERNAL MEDICINE

## 2022-08-16 RX ORDER — TAMSULOSIN HYDROCHLORIDE 0.4 MG/1
0.4 CAPSULE ORAL DAILY
Qty: 90 CAPSULE | Refills: 3 | Status: SHIPPED | OUTPATIENT
Start: 2022-08-16

## 2022-08-16 NOTE — PROGRESS NOTES
Hamida Metcalf is a 73 y.o. male who presents for evaluation of routine follow up for htn, copd, cad, hld. Last seen by me feb 15, 2022 in welcome to medicare visit. Overall doing stably, though has been having some numbness in both upper legs over past few months. Left leg is worse than right. Symptoms start after having been seated for prolonged time. No pain, no weakness. Also struggles with frequent urination, typically up every 1-2 hours all night long. No hematuria or dysuria. Breathing has improved with breztri, but still needs proair typically few times each week. ROS:  Constitutional: negative for fevers, chills, anorexia and weight loss  Eyes:   negative for visual disturbance and irritation  ENT:   negative for tinnitus,sore throat,nasal congestion,ear pain,hoarseness  Respiratory:  negative for cough, hemoptysis, dyspnea,wheezing  CV:   negative for chest pain, palpitations, lower extremity edema  GI:   negative for nausea, vomiting, diarrhea, abdominal pain,melena  Genitourinary: negative for frequency, dysuria and hematuria  Musculoskel: negative for myalgias, arthralgias, back pain, muscle weakness, joint pain.   ++numbness both upper legs, worse in left  Neurological:  negative for headaches, dizziness, focal weakness, numbness  Psychiatric:     Negative for depression or anxiety      Past Medical History:   Diagnosis Date    Acute myocardial infarction, unspecified site, episode of care unspecified 2/22/2013    CAD (coronary artery disease)     Chest pain 10/15/2014    COPD (chronic obstructive pulmonary disease) ([facility] [address]     Coronary atherosclerosis of native coronary artery 2/22/2013    Exercise induced bronchospasm 2/22/2013    Hyperlipidemia     Hypertension     Hyperthyroidism     Old myocardial infarction 2/22/2013    Pacemaker     Tobacco use 5/5/2012       Past Surgical History:   Procedure Laterality Date    HX CATARACT REMOVAL Left 02/02/2016    HX CORONARY STENT PLACEMENT HX HEART CATHETERIZATION      HX OTHER SURGICAL  2018    Pacemaker    HX PACEMAKER  2018    HX PTCA      [place] CARDIAC SURG PROCEDURE UNLIST      stents may 2012        Family History   Problem Relation Age of Onset    Cancer Mother         lung cancer    Heart Disease Father     Thyroid Disease Brother     Thyroid Disease Maternal Aunt        Social History     Socioeconomic History    Marital status:      Spouse name: Not on file    Number of children: Not on file    Years of education: Not on file    Highest education level: Not on file   Occupational History    Not on file   Tobacco Use    Smoking status: Former     Packs/day: 1.00     Years: 45.00     Pack years: 45.00     Types: Cigarettes     Quit date: 2020     Years since quittin.2    Smokeless tobacco: Never   Vaping Use    Vaping Use: Never used   Substance and Sexual Activity    Alcohol use: No     Alcohol/week: 0.0 standard drinks    Drug use: Yes     Types: Marijuana     Comment: 1/4 ounce per week     Sexual activity: Not Currently   Other Topics Concern    Not on file   Social History Narrative    Not on file     Social Determinants of Health     Financial Resource Strain: Low Risk     Difficulty of Paying Living Expenses: Not hard at all   Food Insecurity: No Food Insecurity    Worried About Running Out of Food in the Last Year: Never true    Ran Out of Food in the Last Year: Never true   Transportation Needs: Not on file   Physical Activity: Not on file   Stress: Not on file   Social Connections: Not on file   Intimate Partner Violence: Not on file   Housing Stability: Not on file          Visit Vitals  BP (!) 146/80 (BP 1 Location: Left upper arm, BP Patient Position: Sitting)   Pulse 71   Temp 98.1 °F (36.7 °C) (Temporal)   Resp 16   Ht 5' 7\" (1.702 m)   Wt 163 lb 9.6 oz (74.2 kg)   SpO2 95%   BMI 25.62 kg/m²       Physical Examination:   General - Well appearing male  HEENT - PERRL, TM no erythema/opacification, normal nasal turbinates, no oropharyngeal erythema or exudate, MMM  Neck - supple, no bruits, no thyroidomegaly, no lymphadenopathy  Pulm - mild exp rhonchi bilaterally. Diminished through out. Cardio - RRR, normal S1 S2, no murmur  Abd - soft, nontender, no masses, no HSM  Extrem - no edema, +2 distal pulses  Neuro-  No focal deficits, CN intact     Assessment/Plan:     Bph with frequency--rx for flomax. Psa 0.7 in feb  Lumbar radiculopathy, left > right--check xray lumbar spine  Copd--continue breztri  Hypothyroid--on synthroid  Ex smoker--check LDCT lungs  Screen colon cancer--FIT card given  Cad with stents--continue asa, coreg, cozaar  Htn--encouraged to monitor at home, has not taken any meds yet this am.  Continue hctz, coreg, cozaar  Daily MJ use--    Rtc 6 months for johanny Rodriguez III, DO              Discussed with the patient the current USPSTF guidelines released March 9, 2021 for screening for lung cancer. For adults aged 48 to [de-identified] years who have a 20 pack-year smoking history and currently smoke or have quit within the past 15 years the grade B recommendation is to:  Screen for lung cancer with low-dose computed tomography (LDCT) every year. Stop screening once a person has not smoked for 15 years or has a health problem that limits life expectancy or the ability to have lung surgery. The patient has a 45 pack-year history of cigarette smoking and currently is smoke free. Discussed with patient the risks and benefits of screening, including over-diagnosis, false positive rate, and total radiation exposure. The patient currently exhibits no signs or symptoms suggestive of lung cancer. Discussed with patient the importance of compliance with yearly annual lung cancer screenings and willingness to undergo diagnosis and treatment if screening scan is positive.   In addition, the patient was counseled regarding the importance of remaining smoke free and/or total smoking cessation.     Also reviewed the following if the patient has Medicare that as of February 10, 2022, Medicare only covers LDCT screening in patients aged 51-72 with at least a 20 pack-year smoking history who currently smoke or have quit in the last 15 years

## 2022-08-16 NOTE — PROGRESS NOTES
1. \"Have you been to the ER, urgent care clinic since your last visit? Hospitalized since your last visit? \" No    2. \"Have you seen or consulted any other health care providers outside of the [address] [place] [address] since your last visit? \" Yes cardiology, chai and [facility]. 3. For patients aged 39-70: Has the patient had a colonoscopy / FIT/ Cologuard? No      If the patient is female:    4. For patients aged 41-77: Has the patient had a mammogram within the past 2 years? NA - based on age or sex      11. For patients aged 21-65: Has the patient had a pap smear?  NA - based on age or sex

## 2022-08-26 LAB — HEMOCCULT STL QL IA: NEGATIVE

## 2022-08-30 ENCOUNTER — HOSPITAL ENCOUNTER (OUTPATIENT)
Dept: CT IMAGING | Age: 67
Discharge: HOME | End: 2022-08-30
Attending: INTERNAL MEDICINE
Payer: MEDICARE

## 2022-08-30 ENCOUNTER — HOSPITAL ENCOUNTER (OUTPATIENT)
Dept: GENERAL RADIOLOGY | Age: 67
Discharge: HOME | End: 2022-08-30
Attending: INTERNAL MEDICINE
Payer: MEDICARE

## 2022-08-30 DIAGNOSIS — Z87.891 PERSONAL HISTORY OF TOBACCO USE, PRESENTING HAZARDS TO HEALTH: ICD-10-CM

## 2022-08-30 DIAGNOSIS — M54.16 LUMBAR RADICULOPATHY: ICD-10-CM

## 2022-08-30 PROCEDURE — 71271 CT THORAX LUNG CANCER SCR C-: CPT

## 2022-08-30 PROCEDURE — 72100 X-RAY EXAM L-S SPINE 2/3 VWS: CPT

## 2022-08-31 DIAGNOSIS — M47.816 OSTEOARTHRITIS OF LUMBAR SPINE, UNSPECIFIED SPINAL OSTEOARTHRITIS COMPLICATION STATUS: ICD-10-CM

## 2022-08-31 DIAGNOSIS — M51.36 DDD (DEGENERATIVE DISC DISEASE), LUMBAR: Primary | ICD-10-CM

## 2022-08-31 NOTE — PROGRESS NOTES
Called, spoke to pt. Two identifiers confirmed. Pt notified of results/recommendations per Dr. Marcos Second. Pt verbalized understanding of information discussed w/ no further questions at this time. CT lungs is stable. No new findings. Continue same meds.   Will repeat scan again in 12 months

## 2022-08-31 NOTE — PROGRESS NOTES
Called, spoke to pt. Two identifiers confirmed. Pt notified of results/recommendations per Dr. Sheyla Palm. Pt verbalized understanding of information discussed w/ no further questions at this time. Also gave pt the phone number for kikemona perla, he would like referral sent to him  Has severe arthritis in low back. Likely would benefit from seeing spine, dr Ольга Green with [facility] if he wishes.

## 2022-09-13 ENCOUNTER — OFFICE VISIT (OUTPATIENT)
Dept: ORTHOPEDIC SURGERY | Age: 67
End: 2022-09-13
Payer: MEDICARE

## 2022-09-13 VITALS — WEIGHT: 163 LBS | BODY MASS INDEX: 25.58 KG/M2 | HEIGHT: 67 IN

## 2022-09-13 DIAGNOSIS — G89.29 CHRONIC LEFT-SIDED LOW BACK PAIN WITH LEFT-SIDED SCIATICA: ICD-10-CM

## 2022-09-13 DIAGNOSIS — J44.9 CHRONIC OBSTRUCTIVE PULMONARY DISEASE, UNSPECIFIED COPD TYPE (HCC): ICD-10-CM

## 2022-09-13 DIAGNOSIS — M54.42 CHRONIC LEFT-SIDED LOW BACK PAIN WITH LEFT-SIDED SCIATICA: ICD-10-CM

## 2022-09-13 DIAGNOSIS — Z87.891 FORMER SMOKER: ICD-10-CM

## 2022-09-13 DIAGNOSIS — G95.19 NEUROGENIC CLAUDICATION (HCC): Primary | ICD-10-CM

## 2022-09-13 PROCEDURE — 99204 OFFICE O/P NEW MOD 45 MIN: CPT | Performed by: STUDENT IN AN ORGANIZED HEALTH CARE EDUCATION/TRAINING PROGRAM

## 2022-09-13 PROCEDURE — 1123F ACP DISCUSS/DSCN MKR DOCD: CPT | Performed by: STUDENT IN AN ORGANIZED HEALTH CARE EDUCATION/TRAINING PROGRAM

## 2022-09-13 RX ORDER — GABAPENTIN 300 MG/1
300 CAPSULE ORAL 3 TIMES DAILY
Qty: 30 CAPSULE | Refills: 1 | Status: SHIPPED | OUTPATIENT
Start: 2022-09-13

## 2022-09-13 NOTE — PROGRESS NOTES
1. Have you been to the ER, urgent care clinic since your last visit? Hospitalized since your last visit? No    2. Have you seen or consulted any other health care providers outside of the [address] [place] [address] since your last visit? Include any pap smears or colon screening.  No  Chief Complaint   Patient presents with    Back Pain    Leg Pain

## 2022-09-13 NOTE — PROGRESS NOTES
Dylon Pineda (: 1955) is a 73 y.o. male here for evaluation of the following chief complaint(s):  Back Pain and Leg Pain       ASSESSMENT/PLAN:  Below is the assessment and plan developed based on review of pertinent history, physical exam, labs, studies, and medications. 1. Neurogenic claudication ([facility].)  -     REFERRAL TO PHYSICAL THERAPY; Future  -     MRI LUMB SPINE WO CONT; Future  -     gabapentin (Neurontin) 300 mg capsule; Take 1 Capsule by mouth three (3) times daily. Max Daily Amount: 900 mg., Normal, Disp-30 Capsule, R-1  2. Chronic left-sided low back pain with left-sided sciatica  -     XR SPINE LUMBAR BEND/FLEXION EXTENSION; Future  -     REFERRAL TO PHYSICAL THERAPY; Future  -     MRI LUMB SPINE WO CONT; Future  -     gabapentin (Neurontin) 300 mg capsule; Take 1 Capsule by mouth three (3) times daily. Max Daily Amount: 900 mg., Normal, Disp-30 Capsule, R-1  3. Chronic obstructive pulmonary disease, unspecified COPD type ([facility].)  -     REFERRAL TO PHYSICAL THERAPY; Future  -     MRI LUMB SPINE WO CONT; Future  -     gabapentin (Neurontin) 300 mg capsule; Take 1 Capsule by mouth three (3) times daily. Max Daily Amount: 900 mg., Normal, Disp-30 Capsule, R-1  4. Former smoker  -     REFERRAL TO PHYSICAL THERAPY; Future  -     MRI LUMB SPINE WO CONT; Future  -     gabapentin (Neurontin) 300 mg capsule; Take 1 Capsule by mouth three (3) times daily. Max Daily Amount: 900 mg., Normal, Disp-30 Capsule, R-1      Return in about 4 weeks (around 10/11/2022) for Follow up after PT and MRI for review. I would like to see this patient back after MRI lumbar spine. In the meantime I do like to initiate physical therapy and gabapentin 300 at night. I will see him back afterwards. Red flag symptoms discussed with the patient. Patient is to present to the emergency department if any of these symptoms occur. Patient verbalized understanding and agrees to proceed with the aforementioned plan. Thank you for allowing me to participate in the care of this patient. SUBJECTIVE/OBJECTIVE:    HPI    Patient is a pleasant 28-year-old male with a significant medical history of COPD, former smoker quit several years ago, and chronic left greater than right leg pain and decreased ambulatory capacity. He has primarily left lower extremity paresthesias extending into his ankle. He does not endorse any weakness. He has no significant fall history. He does not ambulate with assistive device. His mobility is limited secondary to COPD and his lower extremity pain. He has difficulty standing as well. Pain is relieved with sitting down. He has not trialed physical therapy or injections. No surgical history. Chief Complaint   Patient presents with    Back Pain    Leg Pain     Current Outpatient Medications   Medication Sig    gabapentin (Neurontin) 300 mg capsule Take 1 Capsule by mouth three (3) times daily. Max Daily Amount: 900 mg.    tamsulosin (Flomax) 0.4 mg capsule Take 1 Capsule by mouth in the morning. Breztri Aerosphere 160-9-4.8 mcg/actuation HFAA INHALE TWO PUFFS BY MOUTH TWICE A DAY    hydroCHLOROthiazide (HYDRODIURIL) 12.5 mg tablet TAKE ONE TABLET BY MOUTH DAILY    atorvastatin (LIPITOR) 40 mg tablet TAKE ONE TABLET BY MOUTH DAILY    levothyroxine (SYNTHROID) 100 mcg tablet TAKE 1 TABLET BY MOUTH DAILY BEFORE BREAKFAST    albuterol (PROVENTIL HFA, VENTOLIN HFA, PROAIR HFA) 90 mcg/actuation inhaler INHALE TWO PUFFS BY MOUTH EVERY 6 HOURS AS NEEDED FOR WHEEZING    carvediloL (COREG) 6.25 mg tablet TAKE ONE TABLET BY MOUTH TWICE A DAY    losartan (COZAAR) 50 mg tablet TAKE TWO TABLETS BY MOUTH DAILY    acetaminophen (TYLENOL) 650 mg TbER Take 650 mg by mouth every eight (8) hours. nitroglycerin (NITROSTAT) 0.4 mg SL tablet 1 Tab by SubLINGual route every five (5) minutes as needed for Chest Pain.    inhalational spacing device 1 Each by Does Not Apply route as needed.     aspirin delayed-release 81 mg tablet Take 1 Tab by mouth daily. No current facility-administered medications for this visit.        Past Medical History:   Diagnosis Date    Acute myocardial infarction, unspecified site, episode of care unspecified 2013    CAD (coronary artery disease)     Chest pain 10/15/2014    COPD (chronic obstructive pulmonary disease) (HCC)     Coronary atherosclerosis of native coronary artery 2013    Exercise induced bronchospasm 2013    Hyperlipidemia     Hypertension     Hyperthyroidism     Old myocardial infarction 2013    Pacemaker     Tobacco use 2012     Past Surgical History:   Procedure Laterality Date    HX CATARACT REMOVAL Left 2016    HX CORONARY STENT PLACEMENT      HX HEART CATHETERIZATION      HX OTHER SURGICAL  2018    Pacemaker    HX PACEMAKER  2018    HX PTCA      OH CARDIAC SURG PROCEDURE UNLIST      stents may 2012      Family History   Problem Relation Age of Onset    Cancer Mother         lung cancer    Heart Disease Father     Thyroid Disease Brother     Thyroid Disease Maternal Aunt      Social History     Tobacco Use    Smoking status: Former     Packs/day: 1.00     Years: 45.00     Pack years: 45.00     Types: Cigarettes     Quit date: 2020     Years since quittin.2    Smokeless tobacco: Never   Vaping Use    Vaping Use: Never used   Substance Use Topics    Alcohol use: No     Alcohol/week: 0.0 standard drinks    Drug use: Yes     Types: Marijuana     Comment: 1/4 ounce per week       Social History     Tobacco Use   Smoking Status Former    Packs/day: 1.00    Years: 45.00    Pack years: 45.00    Types: Cigarettes    Quit date: 2020    Years since quittin.2   Smokeless Tobacco Never     Social History     Substance and Sexual Activity   Alcohol Use No    Alcohol/week: 0.0 standard drinks       Review of Systems  Red flag symptoms: Yes  Bowel/Bladder/Saddle Anesthesia: Denies  Weakness/Sensory Disturbance: No weakness but left greater than right lower extremity paresthesias  Ambulation/Falls: Ambulates without assist, no significant fall history    Ht 5' 7\" (1.702 m)   Wt 163 lb (73.9 kg)   BMI 25.53 kg/m²      Physical Exam    GENERAL:  AAOx3, appears stated age, no distress  Body habitus: Centrally overweight    LOWER EXTREMITIES:  Gait: Intact heel toe and tandem gait   Motor: 5/5 in all myotomes L3-S1 bilaterally  Sensory: Intact to light touch in all dermatomes L4-S1 bilaterally  Reflexes: Normal L4 and S1 bilaterally  Pathological reflexes: No sustained clonus, downgoing Babinski bilaterally   Special tests: Negative seated SLR bilaterally      IMAGING:    XR Results (most recent):  Results from Appointment encounter on 09/13/22    XR SPINE LUMBAR BEND/FLEXION EXTENSION    Narrative  2 view lumbar spine flexion-extension with severe disc degeneration at L5-S1 and L2-L3 with mild grade 1 retrolisthesis at L4-5, no instability on dynamic films. Decreased lordosis and mild segmental kyphosis at L2-L3. Vascular calcifications noted throughout. Comparison films from August 30, 2022 demonstrating mild coronal deformity around L2-L3 plus the above after mentioned. An electronic signature was used to authenticate this note.   -- Lakshmi Moreno DO

## 2022-09-21 ENCOUNTER — TELEPHONE (OUTPATIENT)
Dept: ORTHOPEDIC SURGERY | Age: 67
End: 2022-09-21

## 2022-09-21 NOTE — TELEPHONE ENCOUNTER
Gabe Rodarte has called and asked to get Valium for his MRI. He states the wait for IV sedation MRI is too far out. He would like to try the valium that was discussed in clinic. He states the discussion  was 20 mg for 90 min ahead and 5 mg to take 45 min ahead.  Our typically protocol is 5mg one hour ahead and 5 mg at time of procedure so I wanted to check with you first.     Thanks,   Eun Freitas

## 2022-09-22 DIAGNOSIS — G95.19 NEUROGENIC CLAUDICATION (HCC): Primary | ICD-10-CM

## 2022-09-29 RX ORDER — DIAZEPAM 2 MG/1
TABLET ORAL
Qty: 2 TABLET | Refills: 0 | Status: SHIPPED | OUTPATIENT
Start: 2022-09-29

## 2022-10-07 ENCOUNTER — HOSPITAL ENCOUNTER (OUTPATIENT)
Dept: MRI IMAGING | Age: 67
Discharge: HOME | End: 2022-10-07
Attending: STUDENT IN AN ORGANIZED HEALTH CARE EDUCATION/TRAINING PROGRAM
Payer: MEDICARE

## 2022-10-07 DIAGNOSIS — J44.9 CHRONIC OBSTRUCTIVE PULMONARY DISEASE, UNSPECIFIED COPD TYPE (HCC): ICD-10-CM

## 2022-10-07 DIAGNOSIS — G95.19 NEUROGENIC CLAUDICATION (HCC): ICD-10-CM

## 2022-10-07 DIAGNOSIS — M54.42 CHRONIC LEFT-SIDED LOW BACK PAIN WITH LEFT-SIDED SCIATICA: ICD-10-CM

## 2022-10-07 DIAGNOSIS — Z87.891 FORMER SMOKER: ICD-10-CM

## 2022-10-07 DIAGNOSIS — G89.29 CHRONIC LEFT-SIDED LOW BACK PAIN WITH LEFT-SIDED SCIATICA: ICD-10-CM

## 2022-10-07 PROCEDURE — 72148 MRI LUMBAR SPINE W/O DYE: CPT

## 2022-10-18 ENCOUNTER — OFFICE VISIT (OUTPATIENT)
Dept: ORTHOPEDIC SURGERY | Age: 67
End: 2022-10-18
Payer: MEDICARE

## 2022-10-18 VITALS — HEIGHT: 67 IN | WEIGHT: 163 LBS | BODY MASS INDEX: 25.58 KG/M2

## 2022-10-18 DIAGNOSIS — M48.061 SPINAL STENOSIS OF LUMBAR REGION WITH RADICULOPATHY: ICD-10-CM

## 2022-10-18 DIAGNOSIS — M43.16 SPONDYLOLISTHESIS OF LUMBAR REGION: ICD-10-CM

## 2022-10-18 DIAGNOSIS — G89.29 CHRONIC LEFT-SIDED LOW BACK PAIN WITH LEFT-SIDED SCIATICA: Primary | ICD-10-CM

## 2022-10-18 DIAGNOSIS — M54.42 CHRONIC LEFT-SIDED LOW BACK PAIN WITH LEFT-SIDED SCIATICA: Primary | ICD-10-CM

## 2022-10-18 DIAGNOSIS — M54.16 SPINAL STENOSIS OF LUMBAR REGION WITH RADICULOPATHY: ICD-10-CM

## 2022-10-18 DIAGNOSIS — M51.36 DEGENERATIVE DISC DISEASE, LUMBAR: ICD-10-CM

## 2022-10-18 DIAGNOSIS — F41.9 ANXIETY: ICD-10-CM

## 2022-10-18 PROCEDURE — 1123F ACP DISCUSS/DSCN MKR DOCD: CPT | Performed by: STUDENT IN AN ORGANIZED HEALTH CARE EDUCATION/TRAINING PROGRAM

## 2022-10-18 PROCEDURE — 99214 OFFICE O/P EST MOD 30 MIN: CPT | Performed by: STUDENT IN AN ORGANIZED HEALTH CARE EDUCATION/TRAINING PROGRAM

## 2022-10-18 NOTE — PROGRESS NOTES
Susan Sin (: 1955) is a 78 y.o. male here for evaluation of the following chief complaint(s):  Back Pain (MRI results/)       ASSESSMENT/PLAN:  Below is the assessment and plan developed based on review of pertinent history, physical exam, labs, studies, and medications. 1. Chronic left-sided low back pain with left-sided sciatica  -     REFERRAL TO PAIN MANAGEMENT; Future  2. Anxiety  -     REFERRAL TO PAIN MANAGEMENT; Future  3. Degenerative disc disease, lumbar  4. Spondylolisthesis of lumbar region  5. Spinal stenosis of lumbar region with radiculopathy      Return in about 6 weeks (around 2022) for Follow up after Lumbar JESSICA. I would like to see him back after his left-sided L4-L5 transforaminal epidural steroid injection for his L5 radiculopathy. He will need procedural sedation for the injection. I will include this in the order details. I also told him to stop his daily aspirin 1 week prior to the procedure. SUBJECTIVE/OBJECTIVE:  HPI  Patient is a 72-year-old male with chronic left lower extremity pain and paresthesias in the L5 dermatome. He is here today for MRI lumbar spine review. Fortunately for him his MRI does correlate with his symptoms. He has failed conservative management in the form of physical therapy and gabapentin. Of note, he does have a self diagnosed history of anxiety which is self treated with cannabis. He is otherwise healthy. He does take aspirin 81 mg daily which I told him to stop 1 week prior to the injection.     Review of Systems  See above HPI and prior clinic notes for full ROS     Ht 5' 7\" (1.702 m)   Wt 163 lb (73.9 kg)   BMI 25.53 kg/m²    Physical Exam  No interval change in PE, grossly motor/sensory intact, no new neurological deficits    IMAGING:  MRI Results (most recent):  Results from Hospital Encounter encounter on 10/07/22    MRI LUMB SPINE WO CONT    Narrative  EXAM: MRI LUMB SPINE WO CONT    INDICATION: Lumbago with sciatica, left side    COMPARISON: August 30, 2022    TECHNIQUE: MR imaging of the lumbar spine was performed using the following  sequences: sagittal T1, T2, STIR;  axial T1, T2.    CONTRAST:  None. FINDINGS:    Straightening of the normal lumbar lordosis. Vertebral body heights are  maintained. Modic type I endplate degenerative changes at L5-S1. Modic type II  endplate degenerative changes at L2-3. The conus medullaris terminates at L1. Signal and caliber of the distal spinal  cord are within normal limits. The paraspinal soft tissues are within normal limits. Lower thoracic spine: No herniation or stenosis. L1-L2: No herniation or stenosis. L2-L3: Severe disc height loss and endplate osteophyte formation. No significant  foraminal or canal stenosis. L3-L4: Mild facet joint arthropathy. Mild concentric disc bulge. No significant  foraminal or canal stenosis. L4-L5: Facet joint arthropathy, ligamentum flavum thickening, and concentric  disc bulge with mild bilateral foraminal stenosis. No significant canal  stenosis. L5-S1: Well severe disc height loss. No significant herniation or stenosis. Impression  Multilevel endplate degenerative changes. Mild bilateral foraminal stenosis  L4-5. No significant canal stenosis. An electronic signature was used to authenticate this note.   -- Ki Traore, DO

## 2022-10-18 NOTE — PROGRESS NOTES
1. Have you been to the ER, urgent care clinic since your last visit? Hospitalized since your last visit? No    2. Have you seen or consulted any other health care providers outside of the [address] [place] [address] since your last visit? Include any pap smears or colon screening.  No  Chief Complaint   Patient presents with    Back Pain     MRI results

## 2022-11-29 ENCOUNTER — TELEPHONE (OUTPATIENT)
Dept: INTERNAL MEDICINE CLINIC | Age: 67
End: 2022-11-29

## 2022-11-29 NOTE — TELEPHONE ENCOUNTER
Caterina Keita with [facility],   Dr Sienna Willams Office  [phone]      States please have pcp or the nurse call to speak with Dr Brian Hebert regarding patient. States can call dr Sienna Willams personal cell, or office number. States not urgent but it is important and dr Brian Hebert needs to speak with clinical team please.

## 2023-01-13 RX ORDER — LEVOTHYROXINE SODIUM 100 UG/1
100 TABLET ORAL
Qty: 90 TABLET | Refills: 3 | Status: SHIPPED | OUTPATIENT
Start: 2023-01-13

## 2023-01-13 NOTE — TELEPHONE ENCOUNTER
Future Appointments:  Future Appointments   Date Time Provider [place]   2/17/2023  9:00 AM Olive Nuñez [facility] BS AMB        Last Appointment With Me:  8/16/2022     Requested Prescriptions     Pending Prescriptions Disp Refills    levothyroxine (SYNTHROID) 100 mcg tablet 90 Tablet 3     Sig: Take 1 Tablet by mouth Daily (before breakfast).

## 2023-01-25 RX ORDER — BUDESONIDE, GLYCOPYRROLATE, AND FORMOTEROL FUMARATE 160; 9; 4.8 UG/1; UG/1; UG/1
2 AEROSOL, METERED RESPIRATORY (INHALATION) 2 TIMES DAILY
Qty: 32.1 G | Refills: 3 | Status: SHIPPED | OUTPATIENT
Start: 2023-01-25

## 2023-01-25 NOTE — TELEPHONE ENCOUNTER
Future Appointments:  Future Appointments   Date Time Provider [place]   2/17/2023  9:00 AM Lisa Keys [facility] BS AMB        Last Appointment With Me:  8/16/2022     Requested Prescriptions     Pending Prescriptions Disp Refills    budesonide-glycopyr-formoterol (Breztri Aerosphere) 160-9-4.8 mcg/actuation HFAA 32.1 g 3

## 2023-01-30 RX ORDER — TAMSULOSIN HYDROCHLORIDE 0.4 MG/1
0.4 CAPSULE ORAL DAILY
Qty: 90 CAPSULE | Refills: 3 | Status: SHIPPED | OUTPATIENT
Start: 2023-01-30

## 2023-01-30 RX ORDER — BUDESONIDE, GLYCOPYRROLATE, AND FORMOTEROL FUMARATE 160; 9; 4.8 UG/1; UG/1; UG/1
2 AEROSOL, METERED RESPIRATORY (INHALATION) 2 TIMES DAILY
Qty: 32.1 G | Refills: 3 | Status: SHIPPED | OUTPATIENT
Start: 2023-01-30

## 2023-01-30 NOTE — TELEPHONE ENCOUNTER
Future Appointments:  Future Appointments   Date Time Provider [place]   2/17/2023  9:00 AM Emilee Villalba [facility] BS AMB        Last Appointment With Me:  8/16/2022     Requested Prescriptions     Pending Prescriptions Disp Refills    tamsulosin (Flomax) 0.4 mg capsule 90 Capsule 3     Sig: Take 1 Capsule by mouth daily.

## 2023-01-30 NOTE — TELEPHONE ENCOUNTER
Caller requests Refill of:  tamsulosin (Flomax) 0.4 mg capsule      Please send [facility]: [id number] [facility] [address]  [phone]      Visit Appointment History:   Future:   2/17/23  Previous: 8/16/22      Caller confirmed instructions and dosages as correct. Caller was advised that Meds will be refilled as soon as possible, however there can be a 48-72 business hour turn around on refill requests.

## 2023-02-17 ENCOUNTER — OFFICE VISIT (OUTPATIENT)
Dept: INTERNAL MEDICINE CLINIC | Age: 68
End: 2023-02-17
Payer: COMMERCIAL

## 2023-02-17 VITALS
RESPIRATION RATE: 16 BRPM | HEIGHT: 67 IN | OXYGEN SATURATION: 94 % | SYSTOLIC BLOOD PRESSURE: 141 MMHG | DIASTOLIC BLOOD PRESSURE: 82 MMHG | BODY MASS INDEX: 25.96 KG/M2 | HEART RATE: 68 BPM | WEIGHT: 165.4 LBS | TEMPERATURE: 97.7 F

## 2023-02-17 DIAGNOSIS — I49.5 SSS (SICK SINUS SYNDROME) (HCC): ICD-10-CM

## 2023-02-17 DIAGNOSIS — R73.03 PREDIABETES: ICD-10-CM

## 2023-02-17 DIAGNOSIS — E03.9 ACQUIRED HYPOTHYROIDISM: ICD-10-CM

## 2023-02-17 DIAGNOSIS — N40.1 BENIGN PROSTATIC HYPERPLASIA WITH URINARY FREQUENCY: ICD-10-CM

## 2023-02-17 DIAGNOSIS — R35.0 BENIGN PROSTATIC HYPERPLASIA WITH URINARY FREQUENCY: ICD-10-CM

## 2023-02-17 DIAGNOSIS — G95.19 NEUROGENIC CLAUDICATION (HCC): ICD-10-CM

## 2023-02-17 DIAGNOSIS — E78.2 MIXED HYPERLIPIDEMIA: ICD-10-CM

## 2023-02-17 DIAGNOSIS — I25.118 CORONARY ARTERY DISEASE OF NATIVE ARTERY OF NATIVE HEART WITH STABLE ANGINA PECTORIS (HCC): ICD-10-CM

## 2023-02-17 DIAGNOSIS — Z00.00 INITIAL MEDICARE ANNUAL WELLNESS VISIT: Primary | ICD-10-CM

## 2023-02-17 DIAGNOSIS — J44.9 CHRONIC OBSTRUCTIVE PULMONARY DISEASE, UNSPECIFIED COPD TYPE (HCC): ICD-10-CM

## 2023-02-17 DIAGNOSIS — I10 ESSENTIAL HYPERTENSION: ICD-10-CM

## 2023-02-17 PROBLEM — R29.818 NEUROGENIC CLAUDICATION: Status: ACTIVE | Noted: 2023-02-17

## 2023-02-17 RX ORDER — TETANUS TOXOID, REDUCED DIPHTHERIA TOXOID AND ACELLULAR PERTUSSIS VACCINE, ADSORBED 5; 2.5; 8; 8; 2.5 [IU]/.5ML; [IU]/.5ML; UG/.5ML; UG/.5ML; UG/.5ML
0.5 SUSPENSION INTRAMUSCULAR ONCE
Qty: 0.5 ML | Refills: 0 | Status: SHIPPED | OUTPATIENT
Start: 2023-02-17 | End: 2023-02-17

## 2023-02-17 RX ORDER — HYDROCHLOROTHIAZIDE 12.5 MG/1
CAPSULE ORAL
COMMUNITY
Start: 2023-01-24

## 2023-02-17 RX ORDER — REVEFENACIN 175 UG/3ML
175 SOLUTION RESPIRATORY (INHALATION) DAILY
Qty: 90 NEBULE | Refills: 3 | Status: SHIPPED | OUTPATIENT
Start: 2023-02-17

## 2023-02-17 NOTE — PROGRESS NOTES
Angelina Olivo is a 78 y.o. male    Chief Complaint   Patient presents with    Annual Wellness Visit       Visit Vitals  BP (!) 141/82 (BP 1 Location: Left upper arm, BP Patient Position: Sitting, BP Cuff Size: Adult)   Pulse 68   Temp 97.7 °F (36.5 °C) (Temporal)   Resp 16   Ht 5' 7\" (1.702 m)   Wt 165 lb 6.4 oz (75 kg)   SpO2 94%   BMI 25.91 kg/m²           1. Have you been to the ER, urgent care clinic since your last visit? Hospitalized since your last visit? NO    2. Have you seen or consulted any other health care providers outside of the [address] [place] [address] since your last visit? Include any pap smears or colon screening.  NO

## 2023-02-17 NOTE — PATIENT INSTRUCTIONS
Medicare Wellness Visit, Male    The best way to live healthy is to have a lifestyle where you eat a well-balanced diet, exercise regularly, limit alcohol use, and quit all forms of tobacco/nicotine, if applicable. Regular preventive services are another way to keep healthy. Preventive services (vaccines, screening tests, monitoring & exams) can help personalize your care plan, which helps you manage your own care. Screening tests can find health problems at the earliest stages, when they are easiest to treat. [facility] follows the current, evidence-based guidelines published by the [facility] ([facility]) when recommending preventive services for our patients. Because we follow these guidelines, sometimes recommendations change over time as research supports it. (For example, a prostate screening blood test is no longer routinely recommended for men with no symptoms). Of course, you and your doctor may decide to screen more often for some diseases, based on your risk and co-morbidities (chronic disease you are already diagnosed with). Preventive services for you include:  - Medicare offers their members a free annual wellness visit, which is time for you and your primary care provider to discuss and plan for your preventive service needs.  Take advantage of this benefit every year!    -Over the age of 72 should receive the recommended pneumonia vaccines.   -All adults should have a flu vaccine yearly.  -All adults should receive a tetanus vaccine every 10 years.  -Over the age of 48 should receive the shingles vaccines.    -All adults should be screened once for Hepatitis C.  -All adults age 38-68 who are overweight should have a diabetes screening test once every three years.   -Other screening tests & preventive services for persons with diabetes include: an eye exam to screen for diabetic retinopathy, a kidney function test, a foot exam, and stricter control over your cholesterol.   -Cardiovascular screening for adults with routine risk involves an electrocardiogram (ECG) at intervals determined by the provider.     -Colorectal cancer screening should be done for adults age 43-69 with no increased risk factors for colorectal cancer. There are a number of acceptable methods of screening for this type of cancer. Each test has its own benefits and drawbacks. Discuss with your provider what is most appropriate for you during your annual wellness visit. The different tests include: colonoscopy (considered the best screening method), a fecal occult blood test, a fecal DNA test, and sigmoidoscopy.    -Lung cancer screening is recommended annually with a low dose CT scan for adults between age 54 and 68, who have smoked at least 30 pack years (equivalent of 1 pack per day for 30 days), and who is a current smoker or quit less than 15 years ago. -An Abdominal Aortic Aneurysm (AAA) Screening is recommended for men age 73-68 who has ever smoked in their lifetime.      Here is a list of your current Health Maintenance items (your personalized list of preventive services) with a due date:  Health Maintenance Due   Topic Date Due    DTaP/Tdap/Td  (1 - Tdap) Never done    Shingles Vaccine (2 of 2) 12/10/2020    COVID-19 Vaccine (4 - Booster for Pfizer series) 12/07/2021    Yearly Flu Vaccine (1) 08/01/2022    Hemoglobin A1C    02/18/2023    Cholesterol Test   02/18/2023

## 2023-02-17 NOTE — PROGRESS NOTES
Diane Barakat is a 78 y.o. male who presents for evaluation of AWV. Last seen by me aug 16, 2022. Continues to struggle with his low back and left leg radiculopathy. Saw dr Jimenez Blanc, who then referred him to pain management. He received an injection and had relief for about 5 weeks. He would like to return to pain management, but does not remember who he saw. He is also struggling with wheezing on most days, despite using breztri. Proair does not always agree with him. Does not have a nebulizer at home.       ROS:  Constitutional: negative for fevers, chills, anorexia and weight loss  Eyes:   negative for visual disturbance and irritation  ENT:   negative for tinnitus,sore throat,nasal congestion,ear pain,hoarseness  Respiratory:  negative for cough, hemoptysis, dyspnea,wheezing  CV:   negative for chest pain, palpitations, lower extremity edema  GI:   negative for nausea, vomiting, diarrhea, abdominal pain,melena  Genitourinary: negative for frequency, dysuria and hematuria  Musculoskel: negative for myalgias, arthralgias, back pain, muscle weakness, joint pain  Neurological:  negative for headaches, dizziness, focal weakness, numbness  Psychiatric:     Negative for depression or anxiety      Past Medical History:   Diagnosis Date    Acute myocardial infarction, unspecified site, episode of care unspecified 2/22/2013    CAD (coronary artery disease)     Chest pain 10/15/2014    COPD (chronic obstructive pulmonary disease) ([facility] [address]     Coronary atherosclerosis of native coronary artery 2/22/2013    Exercise induced bronchospasm 2/22/2013    Hyperlipidemia     Hypertension     Hyperthyroidism     Old myocardial infarction 2/22/2013    Pacemaker     Tobacco use 5/5/2012       Past Surgical History:   Procedure Laterality Date    HX CATARACT REMOVAL Left 02/02/2016    HX CORONARY STENT PLACEMENT      HX HEART CATHETERIZATION      HX OTHER SURGICAL  12/26/2018    Pacemaker    HX PACEMAKER  12/26/2018    HX PTCA      MD UNLISTED PROCEDURE CARDIAC SURGERY      stents may 2012        Family History   Problem Relation Age of Onset    Cancer Mother         lung cancer    Heart Disease Father     Thyroid Disease Brother     Thyroid Disease Maternal Aunt        Social History     Socioeconomic History    Marital status:      Spouse name: Not on file    Number of children: Not on file    Years of education: Not on file    Highest education level: Not on file   Occupational History    Not on file   Tobacco Use    Smoking status: Former     Packs/day: 1.00     Years: 45.00     Pack years: 45.00     Types: Cigarettes     Quit date: 2020     Years since quittin.7    Smokeless tobacco: Never   Vaping Use    Vaping Use: Never used   Substance and Sexual Activity    Alcohol use: No     Alcohol/week: 0.0 standard drinks    Drug use: Yes     Types: Marijuana     Comment: 1/4 ounce per week     Sexual activity: Not Currently   Other Topics Concern    Not on file   Social History Narrative    Not on file     Social Determinants of Health     Financial Resource Strain: Low Risk     Difficulty of Paying Living Expenses: Not hard at all   Food Insecurity: No Food Insecurity    Worried About Running Out of Food in the Last Year: Never true    Ran Out of Food in the Last Year: Never true   Transportation Needs: Not on file   Physical Activity: Not on file   Stress: Not on file   Social Connections: Not on file   Intimate Partner Violence: Not on file   Housing Stability: Not on file          Visit Vitals  BP (!) 141/82 (BP 1 Location: Left upper arm, BP Patient Position: Sitting, BP Cuff Size: Adult)   Pulse 68   Temp 97.7 °F (36.5 °C) (Temporal)   Resp 16   Ht 5' 7\" (1.702 m)   Wt 165 lb 6.4 oz (75 kg)   SpO2 94%   BMI 25.91 kg/m²       Physical Examination:   General - Well appearing male  HEENT - PERRL, TM no erythema/opacification, normal nasal turbinates, no oropharyngeal erythema or exudate, MMM  Neck - supple, no bruits, no thyroidomegaly, no lymphadenopathy  Pulm - mild exp wheezing bilaterally  Cardio - RRR, normal S1 S2, no murmur  Abd - soft, nontender, no masses, no HSM  Extrem - no edema, +2 distal pulses  Neuro-  No focal deficits, CN intact     Assessment/Plan:     Copd--continue breztri. Add Yupelri neb once daily  Lumbar radiculopathy, left side--referral to pain management, dr Evans Andres. On neurontin  Htn--continue hctz, coreg, cozaar. Check cbc, cmp  Cad with stents--continue asa, coreg, cozaar  Hypothyroid--check tsh, on synthroid  Bph--much improved with addition of flomax at last visit. Check psa  Hyperlipids--on lipitor, check flp, cmp    Rx given for tdap  Rtc 6 months        Teagan Kendrick III, DO              This is an Initial Medicare Annual Wellness Exam (AWV) (Performed 12 months after IPPE or effective date of Medicare Part B enrollment, Once in a lifetime)    I have reviewed the patient's medical history in detail and updated the computerized patient record. Assessment/Plan   Education and counseling provided:  Are appropriate based on today's review and evaluation  End-of-Life planning (with patient's consent)  Pneumococcal Vaccine  Influenza Vaccine  Prostate cancer screening tests (PSA, covered annually)  Colorectal cancer screening tests    1. Initial Medicare annual wellness visit     Depression Risk Factor Screening     3 most recent PHQ Screens 2/17/2023   Little interest or pleasure in doing things Not at all   Feeling down, depressed, irritable, or hopeless Not at all   Total Score PHQ 2 0       Alcohol & Drug Abuse Risk Screen    Do you average more than 1 drink per night or more than 7 drinks a week: No    In the past three months have you have had more than 4 drinks containing alcohol on one occasion: No          Functional Ability and Level of Safety    Hearing: Hearing is good. Activities of Daily Living:   The home contains: handrails and grab bars  Patient needs help with:  transportation     Ambulation: with no difficulty      Fall Risk:  Fall Risk Assessment, last 12 mths 2/17/2023   Able to walk? Yes   Fall in past 12 months? 0   Do you feel unsteady? 0   Are you worried about falling 0      Abuse Screen:  Patient is not abused.   Lives alone       Cognitive Screening    Has your family/caregiver stated any concerns about your memory: no     Cognitive Screening: Normal - MMSE (Mini Mental Status Exam)    Health Maintenance Due     Health Maintenance Due   Topic Date Due    DTaP/Tdap/Td series (1 - Tdap) Never done    Shingles Vaccine (2 of 2) 12/10/2020    COVID-19 Vaccine (4 - Booster for Pfizer series) 12/07/2021    Flu Vaccine (1) 08/01/2022    A1C test (Diabetic or Prediabetic)  02/18/2023    Lipid Screen  02/18/2023       Patient Care Team   Patient Care Team:  Trey Orourke DO as PCP - General (Internal Medicine Physician)  Trey Orourke DO as PCP - REHABILITATION [facility] Empaneled Provider  Brigitte Jean MD (Cardiovascular Disease Physician)  Jenni Elizabeth, DANK as Nurse Practitioner (Nurse Practitioner)  Tanna Quevedo MD as Physician (Cardiovascular Disease Physician)  France Camarena NP as Nurse Practitioner (Cardiovascular Disease Physician)    History     Patient Active Problem List   Diagnosis Code    STEMI (ST elevation myocardial infarction) ([facility] [address] I21.3    Tobacco use Z72.0    S/P coronary artery stent placement Z95.5    Dyslipidemia E78.5    Exercise induced bronchospasm J45.990    Old myocardial infarction I25.2    COPD (chronic obstructive pulmonary disease) ([facility] [address] J44.9    Insomnia G47.00    Essential hypertension I10    ASHD (arteriosclerotic heart disease) I25.10    Acquired hypothyroidism E03.9    S/P cardiac cath Z98.890    SSS (sick sinus syndrome) ([facility] [address] I49.5     Past Medical History:   Diagnosis Date    Acute myocardial infarction, unspecified site, episode of care unspecified 2/22/2013    CAD (coronary artery disease) Chest pain 10/15/2014    COPD (chronic obstructive pulmonary disease) ([facility])     Coronary atherosclerosis of native coronary artery 2/22/2013    Exercise induced bronchospasm 2/22/2013    Hyperlipidemia     Hypertension     Hyperthyroidism     Old myocardial infarction 2/22/2013    Pacemaker     Tobacco use 5/5/2012      Past Surgical History:   Procedure Laterality Date    HX CATARACT REMOVAL Left 02/02/2016    HX CORONARY STENT PLACEMENT      HX HEART CATHETERIZATION      HX OTHER SURGICAL  12/26/2018    Pacemaker    HX PACEMAKER  12/26/2018    HX PTCA      MD UNLISTED PROCEDURE CARDIAC SURGERY      stents may 2012      Current Outpatient Medications   Medication Sig Dispense Refill    hydroCHLOROthiazide (MICROZIDE) 12.5 mg capsule       tamsulosin (Flomax) 0.4 mg capsule Take 1 Capsule by mouth daily. 90 Capsule 3    budesonide-glycopyr-formoterol (Breztri Aerosphere) 160-9-4.8 mcg/actuation HFAA Take 2 Puffs by inhalation two (2) times a day. 32.1 g 3    budesonide-glycopyr-formoterol (Breztri Aerosphere) 160-9-4.8 mcg/actuation HFAA Take 2 Puffs by inhalation two (2) times a day. 32.1 g 3    levothyroxine (SYNTHROID) 100 mcg tablet Take 1 Tablet by mouth Daily (before breakfast). 90 Tablet 3    atorvastatin (LIPITOR) 40 mg tablet TAKE ONE TABLET BY MOUTH DAILY 30 Tablet 5    albuterol (PROVENTIL HFA, VENTOLIN HFA, PROAIR HFA) 90 mcg/actuation inhaler INHALE TWO PUFFS BY MOUTH EVERY 6 HOURS AS NEEDED FOR WHEEZING 8.5 g 2    carvediloL (COREG) 6.25 mg tablet TAKE ONE TABLET BY MOUTH TWICE A DAY 60 Tablet 10    losartan (COZAAR) 50 mg tablet TAKE TWO TABLETS BY MOUTH DAILY 180 Tablet 3    acetaminophen (TYLENOL) 650 mg TbER Take 650 mg by mouth every eight (8) hours. nitroglycerin (NITROSTAT) 0.4 mg SL tablet 1 Tab by SubLINGual route every five (5) minutes as needed for Chest Pain. 1 Bottle 11    inhalational spacing device 1 Each by Does Not Apply route as needed.  1 Device 0    aspirin delayed-release 81 mg tablet Take 1 Tab by mouth daily. 30 Tab 11    diazePAM (VALIUM) 2 mg tablet Take one tablet one hour prior to procedure and one tablet at time of procedure (Patient not taking: No sig reported) 2 Tablet 0    gabapentin (Neurontin) 300 mg capsule Take 1 Capsule by mouth three (3) times daily. Max Daily Amount: 900 mg. (Patient not taking: Reported on 2023) 30 Capsule 1    hydroCHLOROthiazide (HYDRODIURIL) 12.5 mg tablet TAKE ONE TABLET BY MOUTH DAILY (Patient not taking: Reported on 2023) 90 Tablet 3     Allergies   Allergen Reactions    Bupropion Other (comments)     Urinary frequency and flatulence.     Chantix [Varenicline] Other (comments)     Trouble sleeping, soreness of mouth    Codeine Itching    Fluticasone Shortness of Breath and Swelling     possibly due to    Melatonin Myalgia    Nasonex [Mometasone] Other (comments)     swelling of throat and hands possibly due to       Family History   Problem Relation Age of Onset    Cancer Mother         lung cancer    Heart Disease Father     Thyroid Disease Brother     Thyroid Disease Maternal Aunt      Social History     Tobacco Use    Smoking status: Former     Packs/day: 1.00     Years: 45.00     Pack years: 45.00     Types: Cigarettes     Quit date: 2020     Years since quittin.7    Smokeless tobacco: Never   Substance Use Topics    Alcohol use: No     Alcohol/week: 0.0 standard drinks       Rina Billy III,

## 2023-02-18 LAB
ALBUMIN SERPL-MCNC: 3.4 G/DL (ref 3.5–5)
ALBUMIN/GLOB SERPL: 1 (ref 1.1–2.2)
ALP SERPL-CCNC: 110 U/L (ref 45–117)
ALT SERPL-CCNC: 29 U/L (ref 12–78)
ANION GAP SERPL CALC-SCNC: 4 MMOL/L (ref 5–15)
AST SERPL-CCNC: 46 U/L (ref 15–37)
BASOPHILS # BLD: 0 K/UL (ref 0–0.1)
BASOPHILS NFR BLD: 0 % (ref 0–1)
BILIRUB SERPL-MCNC: 0.6 MG/DL (ref 0.2–1)
BUN SERPL-MCNC: 18 MG/DL (ref 6–20)
BUN/CREAT SERPL: 18 (ref 12–20)
CALCIUM SERPL-MCNC: 8.9 MG/DL (ref 8.5–10.1)
CHLORIDE SERPL-SCNC: 99 MMOL/L (ref 97–108)
CHOLEST SERPL-MCNC: 117 MG/DL
CO2 SERPL-SCNC: 29 MMOL/L (ref 21–32)
COMMENT, HOLDF: NORMAL
CREAT SERPL-MCNC: 0.98 MG/DL (ref 0.7–1.3)
DIFFERENTIAL METHOD BLD: NORMAL
EOSINOPHIL # BLD: 0.3 K/UL (ref 0–0.4)
EOSINOPHIL NFR BLD: 4 % (ref 0–7)
ERYTHROCYTE [DISTWIDTH] IN BLOOD BY AUTOMATED COUNT: 13.2 % (ref 11.5–14.5)
EST. AVERAGE GLUCOSE BLD GHB EST-MCNC: 123 MG/DL
GLOBULIN SER CALC-MCNC: 3.4 G/DL (ref 2–4)
GLUCOSE SERPL-MCNC: 106 MG/DL (ref 65–100)
HBA1C MFR BLD: 5.9 % (ref 4–5.6)
HCT VFR BLD AUTO: 42.6 % (ref 36.6–50.3)
HDLC SERPL-MCNC: 52 MG/DL
HDLC SERPL: 2.3 (ref 0–5)
HGB BLD-MCNC: 13.7 G/DL (ref 12.1–17)
IMM GRANULOCYTES # BLD AUTO: 0 K/UL (ref 0–0.04)
IMM GRANULOCYTES NFR BLD AUTO: 0 % (ref 0–0.5)
LDLC SERPL CALC-MCNC: 51.2 MG/DL (ref 0–100)
LYMPHOCYTES # BLD: 1.4 K/UL (ref 0.8–3.5)
LYMPHOCYTES NFR BLD: 18 % (ref 12–49)
MCH RBC QN AUTO: 31.4 PG (ref 26–34)
MCHC RBC AUTO-ENTMCNC: 32.2 G/DL (ref 30–36.5)
MCV RBC AUTO: 97.7 FL (ref 80–99)
MONOCYTES # BLD: 0.8 K/UL (ref 0–1)
MONOCYTES NFR BLD: 10 % (ref 5–13)
NEUTS SEG # BLD: 5.1 K/UL (ref 1.8–8)
NEUTS SEG NFR BLD: 68 % (ref 32–75)
NRBC # BLD: 0 K/UL (ref 0–0.01)
NRBC BLD-RTO: 0 PER 100 WBC
PLATELET # BLD AUTO: 153 K/UL (ref 150–400)
POTASSIUM SERPL-SCNC: 5.2 MMOL/L (ref 3.5–5.1)
PROT SERPL-MCNC: 6.8 G/DL (ref 6.4–8.2)
PSA SERPL-MCNC: 0.7 NG/ML (ref 0.01–4)
RBC # BLD AUTO: 4.36 M/UL (ref 4.1–5.7)
RBC MORPH BLD: NORMAL
SAMPLES BEING HELD,HOLD: NORMAL
SODIUM SERPL-SCNC: 132 MMOL/L (ref 136–145)
TRIGL SERPL-MCNC: 69 MG/DL (ref ?–150)
TSH SERPL DL<=0.05 MIU/L-ACNC: 0.4 UIU/ML (ref 0.36–3.74)
VLDLC SERPL CALC-MCNC: 13.8 MG/DL
WBC # BLD AUTO: 7.6 K/UL (ref 4.1–11.1)

## 2023-02-21 ENCOUNTER — TELEPHONE (OUTPATIENT)
Dept: ORTHOPEDIC SURGERY | Age: 68
End: 2023-02-21

## 2023-02-21 NOTE — TELEPHONE ENCOUNTER
Patient calls to get the information to his pain management provider. He reports the JESSICA lasted 5 weeks. He will contact them for another JESSICA.

## 2023-02-22 RX ORDER — NEBULIZER AND COMPRESSOR
EACH MISCELLANEOUS
Qty: 1 EACH | Refills: 0 | Status: SHIPPED | OUTPATIENT
Start: 2023-02-22 | End: 2023-02-24 | Stop reason: SDUPTHER

## 2023-02-22 NOTE — TELEPHONE ENCOUNTER
Pt states that he has not heard from the home delivery. It shows that the nebulizer and supplies never went to mail order. Can this go today?

## 2023-02-24 ENCOUNTER — TELEPHONE (OUTPATIENT)
Dept: INTERNAL MEDICINE CLINIC | Age: 68
End: 2023-02-24

## 2023-02-24 DIAGNOSIS — R06.2 WHEEZING: ICD-10-CM

## 2023-02-24 DIAGNOSIS — J44.9 CHRONIC OBSTRUCTIVE PULMONARY DISEASE, UNSPECIFIED COPD TYPE (HCC): Primary | ICD-10-CM

## 2023-02-24 RX ORDER — NEBULIZER AND COMPRESSOR
EACH MISCELLANEOUS
Qty: 1 EACH | Refills: 0 | Status: SHIPPED | OUTPATIENT
Start: 2023-02-24

## 2023-02-24 NOTE — TELEPHONE ENCOUNTER
Eleanor received the script and it is not covered under medicare part D it would need to be Part B and a diagnosis code would be needed.

## 2023-02-27 RX ORDER — REVEFENACIN 175 UG/3ML
175 SOLUTION RESPIRATORY (INHALATION) DAILY
Qty: 90 NEBULE | Refills: 3 | Status: SHIPPED | OUTPATIENT
Start: 2023-02-27 | End: 2023-02-27

## 2023-02-27 RX ORDER — REVEFENACIN 175 UG/3ML
175 SOLUTION RESPIRATORY (INHALATION) DAILY
Qty: 90 NEBULE | Refills: 3 | Status: SHIPPED | OUTPATIENT
Start: 2023-02-27

## 2023-02-27 NOTE — TELEPHONE ENCOUNTER
RX shows transmission failure, attempted to send again with same results.      VORB given to Monty Rose at [facility]

## 2023-02-27 NOTE — TELEPHONE ENCOUNTER
Pt states that he has his machine and needs the solution/medication to go to [place] for 90 day supply. Please see prior notes.

## 2023-02-27 NOTE — TELEPHONE ENCOUNTER
PCP: Cristhian Mckeon DO    Last appt: 2/17/2023  No future appointments. Requested Prescriptions     Pending Prescriptions Disp Refills    revefenacin (Yupelri) 175 mcg/3 mL nebu nebulizer solution 90 Nebule 3     Sig: 3 mL by Nebulization route daily.

## 2023-02-27 NOTE — TELEPHONE ENCOUNTER
Pt states has been trying to get his nebulizer meds and cannot , states needs this taken care of.     Please see note below from pharmacy

## 2023-05-26 RX ORDER — TAMSULOSIN HYDROCHLORIDE 0.4 MG/1
0.4 CAPSULE ORAL DAILY
COMMUNITY
Start: 2023-01-30

## 2023-05-26 RX ORDER — HYDROCHLOROTHIAZIDE 12.5 MG/1
CAPSULE, GELATIN COATED ORAL
COMMUNITY
Start: 2023-01-24

## 2023-05-26 RX ORDER — CARVEDILOL 6.25 MG/1
1 TABLET ORAL 2 TIMES DAILY
COMMUNITY
Start: 2021-10-26

## 2023-05-26 RX ORDER — LOSARTAN POTASSIUM 50 MG/1
2 TABLET ORAL DAILY
COMMUNITY
Start: 2021-10-06

## 2023-05-26 RX ORDER — GABAPENTIN 300 MG/1
300 CAPSULE ORAL 3 TIMES DAILY
COMMUNITY
Start: 2022-09-13

## 2023-05-26 RX ORDER — DIAZEPAM 2 MG/1
TABLET ORAL
COMMUNITY
Start: 2022-09-29

## 2023-05-26 RX ORDER — REVEFENACIN 175 UG/3ML
175 SOLUTION RESPIRATORY (INHALATION) DAILY
COMMUNITY
Start: 2023-02-27 | End: 2023-07-03

## 2023-05-26 RX ORDER — NITROGLYCERIN 0.4 MG/1
0.4 TABLET SUBLINGUAL
COMMUNITY
Start: 2017-11-29

## 2023-05-26 RX ORDER — ALBUTEROL SULFATE 90 UG/1
AEROSOL, METERED RESPIRATORY (INHALATION)
COMMUNITY
Start: 2022-01-17

## 2023-05-26 RX ORDER — ATORVASTATIN CALCIUM 40 MG/1
1 TABLET, FILM COATED ORAL DAILY
COMMUNITY
Start: 2022-03-15

## 2023-05-26 RX ORDER — HYDROCHLOROTHIAZIDE 12.5 MG/1
1 TABLET ORAL DAILY
COMMUNITY
Start: 2022-04-28

## 2023-05-26 RX ORDER — ASPIRIN 81 MG/1
81 TABLET ORAL DAILY
COMMUNITY
Start: 2012-05-08

## 2023-05-26 RX ORDER — LEVOTHYROXINE SODIUM 0.1 MG/1
100 TABLET ORAL
COMMUNITY
Start: 2023-01-13

## 2023-05-26 RX ORDER — BUDESONIDE, GLYCOPYRROLATE, AND FORMOTEROL FUMARATE 160; 9; 4.8 UG/1; UG/1; UG/1
2 AEROSOL, METERED RESPIRATORY (INHALATION) 2 TIMES DAILY
COMMUNITY
Start: 2023-01-25

## 2023-05-26 RX ORDER — SENNOSIDES 8.6 MG
650 CAPSULE ORAL EVERY 8 HOURS
COMMUNITY

## 2023-07-03 DIAGNOSIS — J44.9 CHRONIC OBSTRUCTIVE PULMONARY DISEASE, UNSPECIFIED COPD TYPE (HCC): ICD-10-CM

## 2023-07-03 DIAGNOSIS — E03.9 ACQUIRED HYPOTHYROIDISM: Primary | ICD-10-CM

## 2023-07-03 RX ORDER — REVEFENACIN 175 UG/3ML
SOLUTION RESPIRATORY (INHALATION)
Qty: 270 ML | Refills: 3 | Status: SHIPPED | OUTPATIENT
Start: 2023-07-03 | End: 2023-07-03 | Stop reason: SDUPTHER

## 2023-07-03 RX ORDER — REVEFENACIN 175 UG/3ML
SOLUTION RESPIRATORY (INHALATION)
Qty: 270 ML | Refills: 3 | Status: SHIPPED | OUTPATIENT
Start: 2023-07-03

## 2023-07-03 NOTE — TELEPHONE ENCOUNTER
Pt states in order to get today's script they will need a diagnosis code. Please call pharmacy with this.

## 2023-08-17 ENCOUNTER — OFFICE VISIT (OUTPATIENT)
Age: 68
End: 2023-08-17
Payer: MEDICARE

## 2023-08-17 VITALS
SYSTOLIC BLOOD PRESSURE: 108 MMHG | WEIGHT: 161.2 LBS | RESPIRATION RATE: 16 BRPM | HEART RATE: 70 BPM | OXYGEN SATURATION: 92 % | TEMPERATURE: 98.2 F | HEIGHT: 67 IN | DIASTOLIC BLOOD PRESSURE: 65 MMHG | BODY MASS INDEX: 25.3 KG/M2

## 2023-08-17 DIAGNOSIS — G47.00 INSOMNIA, UNSPECIFIED TYPE: Primary | ICD-10-CM

## 2023-08-17 DIAGNOSIS — N40.1 BENIGN PROSTATIC HYPERPLASIA WITH LOWER URINARY TRACT SYMPTOMS, SYMPTOM DETAILS UNSPECIFIED: ICD-10-CM

## 2023-08-17 DIAGNOSIS — I10 ESSENTIAL (PRIMARY) HYPERTENSION: ICD-10-CM

## 2023-08-17 DIAGNOSIS — E78.2 MIXED HYPERLIPIDEMIA: ICD-10-CM

## 2023-08-17 DIAGNOSIS — I25.10 CORONARY ARTERY DISEASE INVOLVING NATIVE CORONARY ARTERY OF NATIVE HEART WITHOUT ANGINA PECTORIS: ICD-10-CM

## 2023-08-17 DIAGNOSIS — J44.9 CHRONIC OBSTRUCTIVE PULMONARY DISEASE, UNSPECIFIED COPD TYPE (HCC): ICD-10-CM

## 2023-08-17 DIAGNOSIS — E03.9 ACQUIRED HYPOTHYROIDISM: ICD-10-CM

## 2023-08-17 PROCEDURE — 3017F COLORECTAL CA SCREEN DOC REV: CPT | Performed by: INTERNAL MEDICINE

## 2023-08-17 PROCEDURE — 3078F DIAST BP <80 MM HG: CPT | Performed by: INTERNAL MEDICINE

## 2023-08-17 PROCEDURE — 3074F SYST BP LT 130 MM HG: CPT | Performed by: INTERNAL MEDICINE

## 2023-08-17 PROCEDURE — 3023F SPIROM DOC REV: CPT | Performed by: INTERNAL MEDICINE

## 2023-08-17 PROCEDURE — G8419 CALC BMI OUT NRM PARAM NOF/U: HCPCS | Performed by: INTERNAL MEDICINE

## 2023-08-17 PROCEDURE — G8427 DOCREV CUR MEDS BY ELIG CLIN: HCPCS | Performed by: INTERNAL MEDICINE

## 2023-08-17 PROCEDURE — 1123F ACP DISCUSS/DSCN MKR DOCD: CPT | Performed by: INTERNAL MEDICINE

## 2023-08-17 PROCEDURE — 99214 OFFICE O/P EST MOD 30 MIN: CPT | Performed by: INTERNAL MEDICINE

## 2023-08-17 PROCEDURE — 1036F TOBACCO NON-USER: CPT | Performed by: INTERNAL MEDICINE

## 2023-08-17 RX ORDER — ZOLPIDEM TARTRATE 5 MG/1
5 TABLET ORAL NIGHTLY PRN
Qty: 90 TABLET | Refills: 1 | Status: SHIPPED | OUTPATIENT
Start: 2023-08-17 | End: 2024-02-13

## 2023-08-17 SDOH — ECONOMIC STABILITY: FOOD INSECURITY: WITHIN THE PAST 12 MONTHS, THE FOOD YOU BOUGHT JUST DIDN'T LAST AND YOU DIDN'T HAVE MONEY TO GET MORE.: NEVER TRUE

## 2023-08-17 SDOH — ECONOMIC STABILITY: FOOD INSECURITY: WITHIN THE PAST 12 MONTHS, YOU WORRIED THAT YOUR FOOD WOULD RUN OUT BEFORE YOU GOT MONEY TO BUY MORE.: NEVER TRUE

## 2023-08-17 SDOH — ECONOMIC STABILITY: HOUSING INSECURITY
IN THE LAST 12 MONTHS, WAS THERE A TIME WHEN YOU DID NOT HAVE A STEADY PLACE TO SLEEP OR SLEPT IN A SHELTER (INCLUDING NOW)?: NO

## 2023-08-17 SDOH — ECONOMIC STABILITY: INCOME INSECURITY: HOW HARD IS IT FOR YOU TO PAY FOR THE VERY BASICS LIKE FOOD, HOUSING, MEDICAL CARE, AND HEATING?: NOT HARD AT ALL

## 2023-08-17 NOTE — PROGRESS NOTES
1. \"Have you been to the ER, urgent care clinic since your last visit? Hospitalized since your last visit? \" No    2. \"Have you seen or consulted any other health care providers outside of the [address] [place] [address] since your last visit? \" Yes Dr Ernestina Bhat      3. For patients aged 43-73: Has the patient had a colonoscopy / FIT/ Cologuard? Yes- no care gap present      If the patient is female:    4. For patients aged 43-66: Has the patient had a mammogram within the past 2 years? NA - based on age or sex      11. For patients aged 21-65: Has the patient had a pap smear?  NA - based on age or sex

## 2023-08-17 NOTE — PATIENT INSTRUCTIONS
Try 1/2  a pill of ambien for starters, see if that dose works. Once you start to sleep better, try not to take it every night, as it will lose it's effectiveness.

## 2023-08-17 NOTE — PROGRESS NOTES
Gualberto Abdi is a 78 y.o. male who presents for evaluation of routine follow up for htn, copd, cad with stents, lumbar radiculopathy into left leg. Last seen by me feb 17, 2023 in awv. Added juliet huston for his copd, and it has helped well. He did have some chest pains and saw cardiology, and had normal stress test.  He has not had any more episodes of chest pain. Still struggles with some left leg radiculopathy--no longer sees pain management, as he had a blow up at his last office appt with them, as they told him that they would not be giving him any injections at that time. Does not sleep well--best night is 4 hours. Most are 2-3 hours, then awake from 1 am until he gets up out of bed. ROS:  Constitutional: negative for fevers, chills, anorexia and weight loss  Eyes:   negative for visual disturbance and irritation  ENT:   negative for tinnitus,sore throat,nasal congestion,ear pain,hoarseness  Respiratory:  negative for cough, hemoptysis, dyspnea,wheezing  CV:   negative for chest pain, palpitations, lower extremity edema  GI:   negative for nausea, vomiting, diarrhea, abdominal pain,melena  Genitourinary: negative for frequency, dysuria and hematuria  Musculoskel: negative for myalgias, arthralgias, muscle weakness, joint pain.   ++lbp with left sided radiculopathy  Neurological:  negative for headaches, dizziness, focal weakness, numbness  Psychiatric:     Negative for depression or anxiety      Past Medical History:   Diagnosis Date    Acute myocardial infarction, unspecified site, episode of care unspecified 2/22/2013    CAD (coronary artery disease)     Chest pain 10/15/2014    COPD (chronic obstructive pulmonary disease) ([facility])     Coronary atherosclerosis of native coronary artery 2/22/2013    Exercise induced bronchospasm 2/22/2013    Hyperlipidemia     Hypertension     Hyperthyroidism     Old myocardial infarction 2/22/2013    Pacemaker     Tobacco use 5/5/2012       Past Surgical History:

## 2023-08-28 ENCOUNTER — TELEPHONE (OUTPATIENT)
Age: 68
End: 2023-08-28

## 2023-08-28 NOTE — TELEPHONE ENCOUNTER
Pt states that he can not take the Ambien as it causes real bad headaches. What else can doctor prescribe? Please call into [facility] mail order for 90 days.

## 2023-08-29 NOTE — TELEPHONE ENCOUNTER
Pt states he is waiting on a call back pertaining to medication that is causing problems. Ambien is the medication. Please call pt.

## 2023-08-30 ENCOUNTER — TELEPHONE (OUTPATIENT)
Age: 68
End: 2023-08-30

## 2023-08-30 DIAGNOSIS — G47.00 INSOMNIA, UNSPECIFIED TYPE: Primary | ICD-10-CM

## 2023-08-30 RX ORDER — ESZOPICLONE 2 MG/1
2 TABLET, FILM COATED ORAL NIGHTLY
Qty: 30 TABLET | Refills: 5 | Status: SHIPPED
Start: 2023-08-30 | End: 2023-09-19

## 2023-08-30 NOTE — TELEPHONE ENCOUNTER
Patient notified of response    States he has been taking melatonin 20 mg and it no longer works  He has also tried OTC sleep aid the were not effective.     Does not drink caffeine, is not napping during day  His room is dark and quiet       He wants PCP to reconsider RX medication

## 2023-08-30 NOTE — TELEPHONE ENCOUNTER
Aurelia Galvan called back. 2 identifiers confirmed. Notified that treatment is being changed due to adverse reaction.

## 2023-08-30 NOTE — TELEPHONE ENCOUNTER
Laila//Catia states she needs a call back in reference to Prescription received for Eszopiclone (LUNESTA) 2 MG TABS. Mirtha Seth she needs to speak with someone on prescription as patient just got a 90 day supply thru Mail Order for Ambien. Please call to discuss & advise.  Thank you      Pharmacy closes 1-1:30 for Lunch

## 2023-09-13 ENCOUNTER — TELEPHONE (OUTPATIENT)
Age: 68
End: 2023-09-13

## 2023-09-13 RX ORDER — UMECLIDINIUM 62.5 UG/1
1 AEROSOL, POWDER ORAL DAILY
Qty: 30 EACH | Refills: 5 | Status: SHIPPED | OUTPATIENT
Start: 2023-09-13 | End: 2023-09-19

## 2023-09-13 NOTE — TELEPHONE ENCOUNTER
----- Message from Ilia Coles sent at 9/12/2023  5:00 PM EDT -----  Subject: Message to Provider    QUESTIONS  Information for Provider? Ashwini Taylor is calling from health insurance   associates and is wanting to let the nurse of Maxime Quintero   know that the patient insurance is no longer covering the patients   medication, Swetha Fall but Jayson Green is covered through her insurance. Please call Ashwini Taylor back to discuss. ---------------------------------------------------------------------------  --------------  John Potter INFO  [phone]; Do not leave any message, patient will call back for answer  ---------------------------------------------------------------------------  --------------  SCRIPT ANSWERS  Relationship to Patient? Covered Entity  Covered Entity Type? Health Insurance? Representative Name?  Ashwini Taylor

## 2023-09-18 ENCOUNTER — TELEPHONE (OUTPATIENT)
Age: 68
End: 2023-09-18

## 2023-09-18 NOTE — TELEPHONE ENCOUNTER
----- Message from Dru Roblero sent at 9/18/2023  9:51 AM EDT -----  Subject: Appointment Request    Reason for Call: Established Patient Appointment needed: Routine Existing   Condition Follow Up    QUESTIONS    Reason for appointment request? Available appointments did not meet   patient need     Additional Information for Provider? patient currently being treated for   frequent urination at night and trouble sleeping. medications he is taking   are not helping and he is requesting to be seen sooner than 10/2 to   discuss.    ---------------------------------------------------------------------------  --------------  Benny Melchor XPFX  [phone]; OK to leave message on voicemail  ---------------------------------------------------------------------------  --------------  SCRIPT ANSWERS

## 2023-09-19 ENCOUNTER — TELEPHONE (OUTPATIENT)
Age: 68
End: 2023-09-19

## 2023-09-19 ENCOUNTER — OFFICE VISIT (OUTPATIENT)
Age: 68
End: 2023-09-19
Payer: MEDICARE

## 2023-09-19 VITALS
DIASTOLIC BLOOD PRESSURE: 78 MMHG | OXYGEN SATURATION: 98 % | SYSTOLIC BLOOD PRESSURE: 147 MMHG | HEART RATE: 60 BPM | BODY MASS INDEX: 24.96 KG/M2 | RESPIRATION RATE: 16 BRPM | WEIGHT: 159 LBS | HEIGHT: 67 IN | TEMPERATURE: 98.2 F

## 2023-09-19 DIAGNOSIS — N40.1 BPH ASSOCIATED WITH NOCTURIA: ICD-10-CM

## 2023-09-19 DIAGNOSIS — J44.9 CHRONIC OBSTRUCTIVE PULMONARY DISEASE, UNSPECIFIED COPD TYPE (HCC): ICD-10-CM

## 2023-09-19 DIAGNOSIS — R35.1 BPH ASSOCIATED WITH NOCTURIA: ICD-10-CM

## 2023-09-19 DIAGNOSIS — G47.00 INSOMNIA, UNSPECIFIED TYPE: Primary | ICD-10-CM

## 2023-09-19 DIAGNOSIS — I10 ESSENTIAL (PRIMARY) HYPERTENSION: ICD-10-CM

## 2023-09-19 DIAGNOSIS — E03.9 ACQUIRED HYPOTHYROIDISM: ICD-10-CM

## 2023-09-19 PROCEDURE — G8427 DOCREV CUR MEDS BY ELIG CLIN: HCPCS | Performed by: INTERNAL MEDICINE

## 2023-09-19 PROCEDURE — 3023F SPIROM DOC REV: CPT | Performed by: INTERNAL MEDICINE

## 2023-09-19 PROCEDURE — 3078F DIAST BP <80 MM HG: CPT | Performed by: INTERNAL MEDICINE

## 2023-09-19 PROCEDURE — 1123F ACP DISCUSS/DSCN MKR DOCD: CPT | Performed by: INTERNAL MEDICINE

## 2023-09-19 PROCEDURE — G8420 CALC BMI NORM PARAMETERS: HCPCS | Performed by: INTERNAL MEDICINE

## 2023-09-19 PROCEDURE — 3077F SYST BP >= 140 MM HG: CPT | Performed by: INTERNAL MEDICINE

## 2023-09-19 PROCEDURE — 1036F TOBACCO NON-USER: CPT | Performed by: INTERNAL MEDICINE

## 2023-09-19 PROCEDURE — 99214 OFFICE O/P EST MOD 30 MIN: CPT | Performed by: INTERNAL MEDICINE

## 2023-09-19 PROCEDURE — 3017F COLORECTAL CA SCREEN DOC REV: CPT | Performed by: INTERNAL MEDICINE

## 2023-09-19 RX ORDER — LEVOTHYROXINE SODIUM 0.1 MG/1
100 TABLET ORAL
Qty: 90 TABLET | Refills: 3 | Status: SHIPPED | OUTPATIENT
Start: 2023-09-19

## 2023-09-19 RX ORDER — TRAZODONE HYDROCHLORIDE 50 MG/1
50 TABLET ORAL NIGHTLY PRN
Qty: 30 TABLET | Refills: 0 | Status: SHIPPED | OUTPATIENT
Start: 2023-09-19

## 2023-09-19 RX ORDER — TAMSULOSIN HYDROCHLORIDE 0.4 MG/1
0.8 CAPSULE ORAL DAILY
Qty: 180 CAPSULE | Refills: 3
Start: 2023-09-19

## 2023-09-19 RX ORDER — ZOLPIDEM TARTRATE 5 MG/1
5 TABLET ORAL NIGHTLY PRN
COMMUNITY
End: 2023-09-19 | Stop reason: SINTOL

## 2023-09-26 DIAGNOSIS — G47.00 INSOMNIA, UNSPECIFIED TYPE: Primary | ICD-10-CM

## 2023-09-26 RX ORDER — TAMSULOSIN HYDROCHLORIDE 0.4 MG/1
0.8 CAPSULE ORAL DAILY
Qty: 180 CAPSULE | Refills: 3 | Status: SHIPPED | OUTPATIENT
Start: 2023-09-26

## 2023-09-26 RX ORDER — ESZOPICLONE 3 MG/1
3 TABLET, FILM COATED ORAL NIGHTLY PRN
Qty: 30 TABLET | Refills: 2 | Status: SHIPPED | OUTPATIENT
Start: 2023-09-26 | End: 2023-12-25

## 2023-09-26 NOTE — TELEPHONE ENCOUNTER
PCP: Zelia Cockayne, DO    Last appt: 9/19/2023  Future Appointments   Date Time Provider [address]   3/12/2024  8:40 AM Indio Qiu III [facility] BS AMB       Requested Prescriptions     Pending Prescriptions Disp Refills    tamsulosin (FLOMAX) 0.4 MG capsule 180 capsule 3     Sig: Take 2 capsules by mouth daily    eszopiclone 3 MG TABS 30 tablet 0     Sig: Take 1 tablet by mouth nightly as needed (insomnia).  Max Daily Amount: 3 mg

## 2023-09-26 NOTE — TELEPHONE ENCOUNTER
Pt needs medications adjusted. Pt also needs refills. Pt has an explanation (long) for each thing. Please call to work with pt on getting this all correct. Pt will need these to go to [facility].

## 2023-10-02 ENCOUNTER — TELEPHONE (OUTPATIENT)
Age: 68
End: 2023-10-02

## 2023-10-02 NOTE — TELEPHONE ENCOUNTER
eszopiclone 3 MG TABS    [facility] needs a call pertaining to this medication prior to filling. Pt states that he really needs this medication.

## 2023-10-02 NOTE — TELEPHONE ENCOUNTER
Spoke with Ariel Viera this am, see 9/19/23 encounter and to Marjorie Alva again this afternoon with approval to fill medication     Marjorie Alva notified that Ambien was discontinued

## 2023-10-02 NOTE — TELEPHONE ENCOUNTER
Call placed to [facility], spoke with pharmacists Derik Negrete  Two pt identifiers confirmed.     They were wanting to confirm dosage of eszopiclone as 3 mg daily    Dosage confirmed

## 2023-10-02 NOTE — TELEPHONE ENCOUNTER
[facility] they need a call back in reference to getting clarification on medication being prescribed, Eszopiclone 3 MG TABS. Please call to discuss & advise.  Thank you    # is [phone]    Ref#   [id number]

## 2023-10-16 RX ORDER — TRAZODONE HYDROCHLORIDE 50 MG/1
50 TABLET ORAL NIGHTLY PRN
Qty: 90 TABLET | Refills: 3 | Status: SHIPPED | OUTPATIENT
Start: 2023-10-16

## 2023-11-20 RX ORDER — BUDESONIDE, GLYCOPYRROLATE, AND FORMOTEROL FUMARATE 160; 9; 4.8 UG/1; UG/1; UG/1
AEROSOL, METERED RESPIRATORY (INHALATION)
Qty: 32.1 G | Refills: 3 | Status: SHIPPED | OUTPATIENT
Start: 2023-11-20

## 2023-12-06 DIAGNOSIS — G47.00 INSOMNIA, UNSPECIFIED TYPE: ICD-10-CM

## 2023-12-06 RX ORDER — ESZOPICLONE 3 MG/1
3 TABLET, FILM COATED ORAL NIGHTLY PRN
Qty: 30 TABLET | Refills: 2 | Status: SHIPPED | OUTPATIENT
Start: 2023-12-06 | End: 2024-03-05

## 2023-12-06 RX ORDER — BUDESONIDE, GLYCOPYRROLATE, AND FORMOTEROL FUMARATE 160; 9; 4.8 UG/1; UG/1; UG/1
AEROSOL, METERED RESPIRATORY (INHALATION)
Qty: 32.1 G | Refills: 3 | Status: SHIPPED | OUTPATIENT
Start: 2023-12-06

## 2023-12-06 RX ORDER — LEVOTHYROXINE SODIUM 0.1 MG/1
100 TABLET ORAL
Qty: 90 TABLET | Refills: 3 | Status: SHIPPED | OUTPATIENT
Start: 2023-12-06

## 2023-12-06 RX ORDER — TAMSULOSIN HYDROCHLORIDE 0.4 MG/1
0.8 CAPSULE ORAL DAILY
Qty: 180 CAPSULE | Refills: 3 | Status: SHIPPED | OUTPATIENT
Start: 2023-12-06

## 2023-12-06 NOTE — TELEPHONE ENCOUNTER
PCP: Yury Garza,     Last appt: 9/19/2023   Future Appointments   Date Time Provider [address]   3/12/2024  8:40 AM Enoc Faria, DO [facility] BS AMB       Requested Prescriptions     Pending Prescriptions Disp Refills    tamsulosin (FLOMAX) 0.4 MG capsule 180 capsule 1     Sig: Take 2 capsules by mouth daily    Budeson-Glycopyrrol-Formoterol (BREZTRI AEROSPHERE) 160-9-4.8 MCG/ACT AERO 32.1 g 1     Sig: USE 2 INHALATIONS BY MOUTH TWICE DAILY    levothyroxine (SYNTHROID) 100 MCG tablet 90 tablet 1     Sig: Take 1 tablet by mouth every morning (before breakfast)    eszopiclone 3 MG TABS 30 tablet 2     Sig: Take 1 tablet by mouth nightly as needed (insomnia) for up to 90 days.  Max Daily Amount: 3 mg

## 2023-12-06 NOTE — TELEPHONE ENCOUNTER
PCP: Yobani Faye DO    Last appt: 9/19/2023   Future Appointments   Date Time Provider [address]   3/12/2024  8:40 AM Airam Luis DO [facility] BS AMB       Requested Prescriptions     Pending Prescriptions Disp Refills    tamsulosin (FLOMAX) 0.4 MG capsule 180 capsule 1     Sig: Take 2 capsules by mouth daily    Budeson-Glycopyrrol-Formoterol (BREZTRI AEROSPHERE) 160-9-4.8 MCG/ACT AERO 32.1 g 1     Sig: USE 2 INHALATIONS BY MOUTH TWICE DAILY    levothyroxine (SYNTHROID) 100 MCG tablet 90 tablet 1     Sig: Take 1 tablet by mouth every morning (before breakfast)

## 2024-01-03 DIAGNOSIS — G47.00 INSOMNIA, UNSPECIFIED TYPE: ICD-10-CM

## 2024-01-03 RX ORDER — ESZOPICLONE 3 MG/1
3 TABLET, FILM COATED ORAL NIGHTLY PRN
Qty: 90 TABLET | Refills: 3 | Status: SHIPPED | OUTPATIENT
Start: 2024-01-03 | End: 2024-12-28

## 2024-01-03 NOTE — TELEPHONE ENCOUNTER
eszopiclone 3 MG TABS    Refill to [facility] mail order 90 day script.    Pt is out of medication.

## 2024-02-13 ENCOUNTER — OFFICE VISIT (OUTPATIENT)
Age: 69
End: 2024-02-13
Payer: MEDICARE

## 2024-02-13 VITALS
SYSTOLIC BLOOD PRESSURE: 114 MMHG | DIASTOLIC BLOOD PRESSURE: 72 MMHG | TEMPERATURE: 98.2 F | HEART RATE: 69 BPM | BODY MASS INDEX: 25.21 KG/M2 | WEIGHT: 160.6 LBS | HEIGHT: 67 IN | OXYGEN SATURATION: 92 % | RESPIRATION RATE: 18 BRPM

## 2024-02-13 DIAGNOSIS — Z87.891 PERSONAL HISTORY OF TOBACCO USE: ICD-10-CM

## 2024-02-13 DIAGNOSIS — I49.5 SICK SINUS SYNDROME (HCC): ICD-10-CM

## 2024-02-13 DIAGNOSIS — Z23 NEED FOR PROPHYLACTIC VACCINATION AGAINST STREPTOCOCCUS PNEUMONIAE (PNEUMOCOCCUS): ICD-10-CM

## 2024-02-13 DIAGNOSIS — R35.1 BPH ASSOCIATED WITH NOCTURIA: ICD-10-CM

## 2024-02-13 DIAGNOSIS — Z00.00 MEDICARE ANNUAL WELLNESS VISIT, SUBSEQUENT: Primary | ICD-10-CM

## 2024-02-13 DIAGNOSIS — E78.2 MIXED HYPERLIPIDEMIA: ICD-10-CM

## 2024-02-13 DIAGNOSIS — I10 ESSENTIAL HYPERTENSION: ICD-10-CM

## 2024-02-13 DIAGNOSIS — I25.10 CORONARY ARTERY DISEASE INVOLVING NATIVE CORONARY ARTERY OF NATIVE HEART WITHOUT ANGINA PECTORIS: ICD-10-CM

## 2024-02-13 DIAGNOSIS — J44.9 CHRONIC OBSTRUCTIVE PULMONARY DISEASE, UNSPECIFIED COPD TYPE (HCC): ICD-10-CM

## 2024-02-13 DIAGNOSIS — N40.1 BPH ASSOCIATED WITH NOCTURIA: ICD-10-CM

## 2024-02-13 DIAGNOSIS — G47.09 OTHER INSOMNIA: ICD-10-CM

## 2024-02-13 DIAGNOSIS — Z12.5 PROSTATE CANCER SCREENING: ICD-10-CM

## 2024-02-13 DIAGNOSIS — R73.03 PREDIABETES: ICD-10-CM

## 2024-02-13 DIAGNOSIS — E03.9 ACQUIRED HYPOTHYROIDISM: ICD-10-CM

## 2024-02-13 PROCEDURE — G0439 PPPS, SUBSEQ VISIT: HCPCS | Performed by: INTERNAL MEDICINE

## 2024-02-13 PROCEDURE — 99213 OFFICE O/P EST LOW 20 MIN: CPT | Performed by: INTERNAL MEDICINE

## 2024-02-13 PROCEDURE — PBSHW PNEUMOCOCCAL, PCV20, PREVNAR 20, (AGE 6W+), IM, PF: Performed by: INTERNAL MEDICINE

## 2024-02-13 PROCEDURE — G0296 VISIT TO DETERM LDCT ELIG: HCPCS | Performed by: INTERNAL MEDICINE

## 2024-02-13 PROCEDURE — 90677 PCV20 VACCINE IM: CPT | Performed by: INTERNAL MEDICINE

## 2024-02-13 PROCEDURE — 1123F ACP DISCUSS/DSCN MKR DOCD: CPT | Performed by: INTERNAL MEDICINE

## 2024-02-13 PROCEDURE — 3078F DIAST BP <80 MM HG: CPT | Performed by: INTERNAL MEDICINE

## 2024-02-13 PROCEDURE — 3074F SYST BP LT 130 MM HG: CPT | Performed by: INTERNAL MEDICINE

## 2024-02-13 NOTE — PROGRESS NOTES
1. \"Have you been to the ER, urgent care clinic since your last visit?  Hospitalized since your last visit?\" No    2. \"Have you seen or consulted any other health care providers outside of the [facility] since your last visit?\" No     3. For patients aged 45-75: Has the patient had a colonoscopy / FIT/ Cologuard? NA - based on age      If the patient is female:    4. For patients aged 40-74: Has the patient had a mammogram within the past 2 years? NA - based on age or sex      5. For patients aged 21-65: Has the patient had a pap smear? NA - based on age or sex  
After obtaining consent, and per verbal order from Dr. Rabago, , patient received Prevnar 20 vaccine given by Carol Wakefield in left Deltoid. Prevnar 20 Vaccine 0.5 mL IM now. Patient was observed for 10 minutes post injection. Patient tolerated injection well.     Carol Wakefield LPN  
Drug use: Yes     Types: Marijuana (Weed)    Sexual activity: Not Currently     Partners: Female   Other Topics Concern    Not on file   Social History Narrative    Not on file     Social Determinants of Health     Financial Resource Strain: Low Risk  (2/13/2024)    Overall Financial Resource Strain (CARDIA)     Difficulty of Paying Living Expenses: Not hard at all   Food Insecurity: No Food Insecurity (2/13/2024)    Hunger Vital Sign     Worried About Running Out of Food in the Last Year: Never true     Ran Out of Food in the Last Year: Never true   Transportation Needs: Unknown (2/13/2024)    PRAPARE - Transportation     Lack of Transportation (Medical): Not on file     Lack of Transportation (Non-Medical): No   Physical Activity: Inactive (2/13/2024)    Exercise Vital Sign     Days of Exercise per Week: 0 days     Minutes of Exercise per Session: 0 min   Stress: Not on file   Social Connections: Not on file   Intimate Partner Violence: Not on file   Housing Stability: Unknown (2/13/2024)    Housing Stability Vital Sign     Unable to Pay for Housing in the Last Year: Not on file     Number of Places Lived in the Last Year: Not on file     Unstable Housing in the Last Year: No          /72 (Site: Left Upper Arm, Position: Sitting, Cuff Size: Large Adult)   Pulse 69   Temp 98.2 °F (36.8 °C) (Temporal)   Resp 18   Ht 1.702 m (5' 7\")   Wt 72.8 kg (160 lb 9.6 oz)   SpO2 92%   BMI 25.15 kg/m²     Physical Examination:   General - Well appearing male  HEENT - PERRL, TM no erythema/opacification, normal nasal turbinates, no oropharyngeal erythema or exudate, MMM  Neck - supple, no bruits, no thyroidomegaly, no lymphadenopathy  Pulm - clear to auscultation bilaterally  Cardio - RRR, normal S1 S2, no murmur  Abd - soft, nontender, no masses, no HSM  Extrem - no edema, +2 distal pulses  Neuro-  No focal deficits, CN intact     Assessment/Plan:     Bph with nocturia--improved with increased dose of flomax to 0.8

## 2024-02-13 NOTE — PATIENT INSTRUCTIONS
towel, or swimming.  Always wear a seat belt when traveling in a car. Always wear a helmet when riding a bicycle or motorcycle.

## 2024-02-14 LAB
ALBUMIN SERPL-MCNC: 3.7 G/DL (ref 3.5–5)
ALBUMIN/GLOB SERPL: 1.1 (ref 1.1–2.2)
ALP SERPL-CCNC: 95 U/L (ref 45–117)
ALT SERPL-CCNC: 32 U/L (ref 12–78)
ANION GAP SERPL CALC-SCNC: 5 MMOL/L (ref 5–15)
AST SERPL-CCNC: 24 U/L (ref 15–37)
BASOPHILS # BLD: 0 K/UL (ref 0–0.1)
BASOPHILS NFR BLD: 0 % (ref 0–1)
BILIRUB SERPL-MCNC: 0.5 MG/DL (ref 0.2–1)
BUN SERPL-MCNC: 15 MG/DL (ref 6–20)
BUN/CREAT SERPL: 15 (ref 12–20)
CALCIUM SERPL-MCNC: 9.2 MG/DL (ref 8.5–10.1)
CHLORIDE SERPL-SCNC: 100 MMOL/L (ref 97–108)
CHOLEST SERPL-MCNC: 126 MG/DL
CO2 SERPL-SCNC: 29 MMOL/L (ref 21–32)
CREAT SERPL-MCNC: 1.02 MG/DL (ref 0.7–1.3)
DIFFERENTIAL METHOD BLD: ABNORMAL
EOSINOPHIL # BLD: 0.5 K/UL (ref 0–0.4)
EOSINOPHIL NFR BLD: 5 % (ref 0–7)
ERYTHROCYTE [DISTWIDTH] IN BLOOD BY AUTOMATED COUNT: 12.7 % (ref 11.5–14.5)
EST. AVERAGE GLUCOSE BLD GHB EST-MCNC: 114 MG/DL
GLOBULIN SER CALC-MCNC: 3.3 G/DL (ref 2–4)
GLUCOSE SERPL-MCNC: 103 MG/DL (ref 65–100)
HBA1C MFR BLD: 5.6 % (ref 4–5.6)
HCT VFR BLD AUTO: 43.1 % (ref 36.6–50.3)
HDLC SERPL-MCNC: 56 MG/DL
HDLC SERPL: 2.3 (ref 0–5)
HGB BLD-MCNC: 14.6 G/DL (ref 12.1–17)
IMM GRANULOCYTES # BLD AUTO: 0 K/UL (ref 0–0.04)
IMM GRANULOCYTES NFR BLD AUTO: 0 % (ref 0–0.5)
LDLC SERPL CALC-MCNC: 49.2 MG/DL (ref 0–100)
LYMPHOCYTES # BLD: 1.8 K/UL (ref 0.8–3.5)
LYMPHOCYTES NFR BLD: 20 % (ref 12–49)
MCH RBC QN AUTO: 32.3 PG (ref 26–34)
MCHC RBC AUTO-ENTMCNC: 33.9 G/DL (ref 30–36.5)
MCV RBC AUTO: 95.4 FL (ref 80–99)
MONOCYTES # BLD: 0.8 K/UL (ref 0–1)
MONOCYTES NFR BLD: 8 % (ref 5–13)
NEUTS SEG # BLD: 5.9 K/UL (ref 1.8–8)
NEUTS SEG NFR BLD: 67 % (ref 32–75)
NRBC # BLD: 0 K/UL (ref 0–0.01)
NRBC BLD-RTO: 0 PER 100 WBC
PLATELET # BLD AUTO: 184 K/UL (ref 150–400)
PMV BLD AUTO: 10.6 FL (ref 8.9–12.9)
POTASSIUM SERPL-SCNC: 4 MMOL/L (ref 3.5–5.1)
PROT SERPL-MCNC: 7 G/DL (ref 6.4–8.2)
PSA SERPL-MCNC: 0.6 NG/ML (ref 0.01–4)
RBC # BLD AUTO: 4.52 M/UL (ref 4.1–5.7)
SODIUM SERPL-SCNC: 134 MMOL/L (ref 136–145)
T4 FREE SERPL-MCNC: 1.6 NG/DL (ref 0.8–1.5)
TRIGL SERPL-MCNC: 104 MG/DL
TSH SERPL DL<=0.05 MIU/L-ACNC: 0.5 UIU/ML (ref 0.36–3.74)
VLDLC SERPL CALC-MCNC: 20.8 MG/DL
WBC # BLD AUTO: 8.9 K/UL (ref 4.1–11.1)

## 2024-02-21 ENCOUNTER — TELEPHONE (OUTPATIENT)
Age: 69
End: 2024-02-21

## 2024-02-21 NOTE — TELEPHONE ENCOUNTER
----- Message from Sofia Cedeno sent at 2/21/2024  2:48 PM EST -----  Subject: Message to Provider    QUESTIONS  Information for Provider? patient called stating he was seen on 2/13 and   he and the dr discussed a medication Yupelri. he states the pharmacy wants   to charge him $250 for a 30 day supply and the dr informed him that he   could give him a code of some kind to get the insurance to cover it or he   could prescribe an alternate medication but the patient states he never   heard anything back. please call patient as soon as possible  ---------------------------------------------------------------------------  --------------  CALL BACK INFO  [phone]; OK to leave message on voicemail  ---------------------------------------------------------------------------  --------------  SCRIPT ANSWERS  Relationship to Patient? Self

## 2024-02-22 NOTE — TELEPHONE ENCOUNTER
Spoke to pt and used two pt identifiers.  Patient notified of response from Eduardo Rabago III, DO    States understanding

## 2024-03-06 ENCOUNTER — HOSPITAL ENCOUNTER (OUTPATIENT)
Facility: HOSPITAL | Age: 69
Discharge: HOME | End: 2024-03-09
Attending: INTERNAL MEDICINE
Payer: MEDICARE

## 2024-03-06 DIAGNOSIS — Z87.891 PERSONAL HISTORY OF TOBACCO USE: ICD-10-CM

## 2024-03-06 PROCEDURE — 71271 CT THORAX LUNG CANCER SCR C-: CPT

## 2024-03-08 NOTE — RESULT ENCOUNTER NOTE
Patient notified of results and response from Eduardo Rabago III, DO    \"Lung scan stable.  Repeat in 12 months.\"    States understanding

## 2024-07-26 DIAGNOSIS — G47.00 INSOMNIA, UNSPECIFIED TYPE: ICD-10-CM

## 2024-07-26 RX ORDER — ESZOPICLONE 3 MG/1
TABLET, FILM COATED ORAL
Qty: 90 TABLET | Refills: 3 | Status: SHIPPED | OUTPATIENT
Start: 2024-07-26 | End: 2025-07-21

## 2024-07-26 RX ORDER — ESZOPICLONE 3 MG/1
3 TABLET, FILM COATED ORAL NIGHTLY PRN
Qty: 90 TABLET | Refills: 3 | OUTPATIENT
Start: 2024-07-26 | End: 2025-07-21

## 2024-07-26 NOTE — TELEPHONE ENCOUNTER
PCP: Eduardo Rabago III,     Last appt: 2/13/2024  Future Appointments   Date Time Provider Department Center   8/13/2024 11:20 AM Eduardo Rabago III, DO [facility]       Requested Prescriptions     Pending Prescriptions Disp Refills    eszopiclone 3 MG TABS 90 tablet 3     Sig: Take 1 tablet by mouth nightly as needed (insomnia) for up to 360 days. Max Daily Amount: 3 mg

## 2024-08-13 ENCOUNTER — TELEPHONE (OUTPATIENT)
Age: 69
End: 2024-08-13

## 2024-08-13 ENCOUNTER — OFFICE VISIT (OUTPATIENT)
Age: 69
End: 2024-08-13
Payer: MEDICARE

## 2024-08-13 VITALS
RESPIRATION RATE: 14 BRPM | TEMPERATURE: 97.7 F | OXYGEN SATURATION: 94 % | HEART RATE: 72 BPM | BODY MASS INDEX: 24.67 KG/M2 | WEIGHT: 157.2 LBS | DIASTOLIC BLOOD PRESSURE: 82 MMHG | HEIGHT: 67 IN | SYSTOLIC BLOOD PRESSURE: 132 MMHG

## 2024-08-13 DIAGNOSIS — I10 ESSENTIAL HYPERTENSION: ICD-10-CM

## 2024-08-13 DIAGNOSIS — I25.10 CORONARY ARTERY DISEASE INVOLVING NATIVE CORONARY ARTERY OF NATIVE HEART WITHOUT ANGINA PECTORIS: ICD-10-CM

## 2024-08-13 DIAGNOSIS — E03.9 ACQUIRED HYPOTHYROIDISM: ICD-10-CM

## 2024-08-13 DIAGNOSIS — Z12.11 SCREEN FOR COLON CANCER: ICD-10-CM

## 2024-08-13 DIAGNOSIS — J44.9 CHRONIC OBSTRUCTIVE PULMONARY DISEASE, UNSPECIFIED COPD TYPE (HCC): ICD-10-CM

## 2024-08-13 DIAGNOSIS — G43.009 MIGRAINE WITHOUT AURA AND WITHOUT STATUS MIGRAINOSUS, NOT INTRACTABLE: Primary | ICD-10-CM

## 2024-08-13 PROCEDURE — 3017F COLORECTAL CA SCREEN DOC REV: CPT | Performed by: INTERNAL MEDICINE

## 2024-08-13 PROCEDURE — 99214 OFFICE O/P EST MOD 30 MIN: CPT | Performed by: INTERNAL MEDICINE

## 2024-08-13 PROCEDURE — 1036F TOBACCO NON-USER: CPT | Performed by: INTERNAL MEDICINE

## 2024-08-13 PROCEDURE — 3023F SPIROM DOC REV: CPT | Performed by: INTERNAL MEDICINE

## 2024-08-13 PROCEDURE — G8420 CALC BMI NORM PARAMETERS: HCPCS | Performed by: INTERNAL MEDICINE

## 2024-08-13 PROCEDURE — 3075F SYST BP GE 130 - 139MM HG: CPT | Performed by: INTERNAL MEDICINE

## 2024-08-13 PROCEDURE — G8427 DOCREV CUR MEDS BY ELIG CLIN: HCPCS | Performed by: INTERNAL MEDICINE

## 2024-08-13 PROCEDURE — 1123F ACP DISCUSS/DSCN MKR DOCD: CPT | Performed by: INTERNAL MEDICINE

## 2024-08-13 PROCEDURE — 3079F DIAST BP 80-89 MM HG: CPT | Performed by: INTERNAL MEDICINE

## 2024-08-13 RX ORDER — IBUPROFEN 200 MG
200 TABLET ORAL EVERY 6 HOURS PRN
COMMUNITY

## 2024-08-13 RX ORDER — UBROGEPANT 50 MG/1
50 TABLET ORAL DAILY PRN
Qty: 16 TABLET | Refills: 1 | Status: SHIPPED | OUTPATIENT
Start: 2024-08-13

## 2024-08-13 NOTE — TELEPHONE ENCOUNTER
Patient states he needs a call back to discuss medication prescribed, Ubrogepant (UBRELVY) 50 MG TABS that he said he was advised needs a Prior Auth & could be up to a Week before he can get Medication.    Please call patient to discuss options/Alternatives. Thank you

## 2024-08-13 NOTE — PROGRESS NOTES
Chief Complaint   Patient presents with    Hypertension     6 month follow up    Headache     Patient stated that he has been experiencing headaches daily \"for a while\" with some sensitivity to light and sound.        \"Have you been to the ER, urgent care clinic since your last visit?  Hospitalized since your last visit?\"    NO    “Have you seen or consulted any other health care providers outside of [facility] since your last visit?”    NO        “Have you had a colorectal cancer screening such as a colonoscopy/FIT/Cologuard?    NO    No colonoscopy on file  No cologuard on file  Date of last FIT: 3/18/2023   No flexible sigmoidoscopy on file         Click Here for Release of Records Request

## 2024-08-14 LAB
ANION GAP SERPL CALC-SCNC: 1 MMOL/L (ref 5–15)
BUN SERPL-MCNC: 14 MG/DL (ref 6–20)
BUN/CREAT SERPL: 18 (ref 12–20)
CALCIUM SERPL-MCNC: 9.5 MG/DL (ref 8.5–10.1)
CHLORIDE SERPL-SCNC: 103 MMOL/L (ref 97–108)
CO2 SERPL-SCNC: 34 MMOL/L (ref 21–32)
CREAT SERPL-MCNC: 0.77 MG/DL (ref 0.7–1.3)
GLUCOSE SERPL-MCNC: 100 MG/DL (ref 65–100)
POTASSIUM SERPL-SCNC: 4 MMOL/L (ref 3.5–5.1)
SODIUM SERPL-SCNC: 138 MMOL/L (ref 136–145)
T4 FREE SERPL-MCNC: 1.3 NG/DL (ref 0.8–1.5)
TSH SERPL DL<=0.05 MIU/L-ACNC: 1.15 UIU/ML (ref 0.36–3.74)

## 2024-08-14 RX ORDER — RIMEGEPANT SULFATE 75 MG/75MG
75 TABLET, ORALLY DISINTEGRATING ORAL DAILY PRN
Qty: 16 TABLET | Refills: 1 | Status: SHIPPED | OUTPATIENT
Start: 2024-08-14

## 2024-08-15 ENCOUNTER — TELEPHONE (OUTPATIENT)
Facility: CLINIC | Age: 69
End: 2024-08-15

## 2024-08-15 NOTE — TELEPHONE ENCOUNTER
Patient called in stating his insurance is denying nurtec. Per MrMakaylaMitch, his insurance will authorize 3 other medications in place of Nurtec.   Meds listed below:  Lyrica   Ubrelvy   Miranda

## 2024-08-16 ENCOUNTER — TELEPHONE (OUTPATIENT)
Age: 69
End: 2024-08-16

## 2024-08-16 NOTE — TELEPHONE ENCOUNTER
Patient called into office inquiring about Ubrelvy Rx. I advised patient that we are waiting on response to appeal submitted.  Patient verbalized understanding.

## 2024-08-16 NOTE — TELEPHONE ENCOUNTER
Patient states he needs a call back in reference to Medication Change needed due to Insurance Denial. Please see PCP's response & advise patient. Thank you

## 2024-08-16 NOTE — TELEPHONE ENCOUNTER
PA for Ubrelvy denied, awaiting appeals letter from [facility].     Faxed appeals paperwork to [facility] to [phone].    Confirmation received.

## 2024-10-20 RX ORDER — BUDESONIDE, GLYCOPYRROLATE, AND FORMOTEROL FUMARATE 160; 9; 4.8 UG/1; UG/1; UG/1
AEROSOL, METERED RESPIRATORY (INHALATION)
Qty: 3 EACH | Refills: 3 | Status: SHIPPED | OUTPATIENT
Start: 2024-10-20

## 2024-10-20 RX ORDER — TAMSULOSIN HYDROCHLORIDE 0.4 MG/1
CAPSULE ORAL
Qty: 180 CAPSULE | Refills: 3 | Status: SHIPPED | OUTPATIENT
Start: 2024-10-20

## 2024-10-20 RX ORDER — LEVOTHYROXINE SODIUM 100 UG/1
100 TABLET ORAL
Qty: 90 TABLET | Refills: 3 | Status: SHIPPED | OUTPATIENT
Start: 2024-10-20

## 2025-01-08 DIAGNOSIS — G47.00 INSOMNIA, UNSPECIFIED TYPE: ICD-10-CM

## 2025-01-08 NOTE — TELEPHONE ENCOUNTER
Caller requests Refill of:    eszopiclone (LUNESTA) 3 MG TABS     Please send to:    [facility] - [place], [place] - [address] - P [phone] - F [phone]     Visit / Appointment History:  Future Appointment at [facility]:  3/11/2025   Last Appointment With PCP:  8/13/2024       Caller confirmed instructions and dosages as correct.    Caller was advised that Meds will be refilled as soon as possible, however there can be a 48-72 business hour turn around on refill requests.

## 2025-01-08 NOTE — TELEPHONE ENCOUNTER
PCP: Eduardo Rabago III, DO    Last appt: 8/13/2024  Future Appointments   Date Time Provider Department Center   3/11/2025 11:00 AM Eduardo Rabago III, DO [facility]       Requested Prescriptions     Pending Prescriptions Disp Refills    eszopiclone (LUNESTA) 3 MG TABS 90 tablet 3     Sig: Take 1 tablet by mouth nightly for 360 days. Max Daily Amount: 3 mg       Pharmacy Confirmed-

## 2025-01-09 RX ORDER — ESZOPICLONE 3 MG/1
3 TABLET, FILM COATED ORAL NIGHTLY
Qty: 90 TABLET | Refills: 3 | Status: SHIPPED | OUTPATIENT
Start: 2025-01-09 | End: 2026-01-04

## 2025-03-11 ENCOUNTER — OFFICE VISIT (OUTPATIENT)
Age: 70
End: 2025-03-11
Payer: MEDICARE

## 2025-03-11 VITALS
BODY MASS INDEX: 26.18 KG/M2 | RESPIRATION RATE: 14 BRPM | TEMPERATURE: 97.9 F | HEART RATE: 72 BPM | HEIGHT: 67 IN | SYSTOLIC BLOOD PRESSURE: 126 MMHG | OXYGEN SATURATION: 94 % | DIASTOLIC BLOOD PRESSURE: 76 MMHG | WEIGHT: 166.8 LBS

## 2025-03-11 DIAGNOSIS — J44.9 CHRONIC OBSTRUCTIVE PULMONARY DISEASE, UNSPECIFIED COPD TYPE (HCC): ICD-10-CM

## 2025-03-11 DIAGNOSIS — G43.009 MIGRAINE WITHOUT AURA AND WITHOUT STATUS MIGRAINOSUS, NOT INTRACTABLE: ICD-10-CM

## 2025-03-11 DIAGNOSIS — Z12.5 PROSTATE CANCER SCREENING: ICD-10-CM

## 2025-03-11 DIAGNOSIS — Z12.11 SCREEN FOR COLON CANCER: ICD-10-CM

## 2025-03-11 DIAGNOSIS — I10 ESSENTIAL HYPERTENSION: ICD-10-CM

## 2025-03-11 DIAGNOSIS — R73.03 PREDIABETES: ICD-10-CM

## 2025-03-11 DIAGNOSIS — I25.10 CORONARY ARTERY DISEASE INVOLVING NATIVE CORONARY ARTERY OF NATIVE HEART WITHOUT ANGINA PECTORIS: ICD-10-CM

## 2025-03-11 DIAGNOSIS — E78.2 MIXED HYPERLIPIDEMIA: ICD-10-CM

## 2025-03-11 DIAGNOSIS — Z87.891 PERSONAL HISTORY OF TOBACCO USE: ICD-10-CM

## 2025-03-11 DIAGNOSIS — E03.9 ACQUIRED HYPOTHYROIDISM: ICD-10-CM

## 2025-03-11 DIAGNOSIS — Z00.00 MEDICARE ANNUAL WELLNESS VISIT, SUBSEQUENT: Primary | ICD-10-CM

## 2025-03-11 LAB
ALBUMIN SERPL-MCNC: 3.4 G/DL (ref 3.5–5)
ALBUMIN/GLOB SERPL: 1 (ref 1.1–2.2)
ALP SERPL-CCNC: 106 U/L (ref 45–117)
ALT SERPL-CCNC: 43 U/L (ref 12–78)
ANION GAP SERPL CALC-SCNC: 3 MMOL/L (ref 2–12)
AST SERPL-CCNC: 27 U/L (ref 15–37)
BASOPHILS # BLD: 0.02 K/UL (ref 0–0.1)
BASOPHILS NFR BLD: 0.3 % (ref 0–1)
BILIRUB SERPL-MCNC: 0.9 MG/DL (ref 0.2–1)
BUN SERPL-MCNC: 16 MG/DL (ref 6–20)
BUN/CREAT SERPL: 17 (ref 12–20)
CALCIUM SERPL-MCNC: 9.2 MG/DL (ref 8.5–10.1)
CHLORIDE SERPL-SCNC: 98 MMOL/L (ref 97–108)
CHOLEST SERPL-MCNC: 124 MG/DL
CO2 SERPL-SCNC: 32 MMOL/L (ref 21–32)
CREAT SERPL-MCNC: 0.93 MG/DL (ref 0.7–1.3)
DIFFERENTIAL METHOD BLD: ABNORMAL
EOSINOPHIL # BLD: 0.3 K/UL (ref 0–0.4)
EOSINOPHIL NFR BLD: 4.5 % (ref 0–7)
ERYTHROCYTE [DISTWIDTH] IN BLOOD BY AUTOMATED COUNT: 12.8 % (ref 11.5–14.5)
EST. AVERAGE GLUCOSE BLD GHB EST-MCNC: 117 MG/DL
GLOBULIN SER CALC-MCNC: 3.4 G/DL (ref 2–4)
GLUCOSE SERPL-MCNC: 103 MG/DL (ref 65–100)
HBA1C MFR BLD: 5.7 % (ref 4–5.6)
HCT VFR BLD AUTO: 43.4 % (ref 36.6–50.3)
HDLC SERPL-MCNC: 58 MG/DL
HDLC SERPL: 2.1 (ref 0–5)
HGB BLD-MCNC: 14.4 G/DL (ref 12.1–17)
IMM GRANULOCYTES # BLD AUTO: 0.04 K/UL (ref 0–0.04)
IMM GRANULOCYTES NFR BLD AUTO: 0.6 % (ref 0–0.5)
LDLC SERPL CALC-MCNC: 51.2 MG/DL (ref 0–100)
LYMPHOCYTES # BLD: 1.59 K/UL (ref 0.8–3.5)
LYMPHOCYTES NFR BLD: 23.8 % (ref 12–49)
MCH RBC QN AUTO: 32.3 PG (ref 26–34)
MCHC RBC AUTO-ENTMCNC: 33.2 G/DL (ref 30–36.5)
MCV RBC AUTO: 97.3 FL (ref 80–99)
MONOCYTES # BLD: 0.6 K/UL (ref 0–1)
MONOCYTES NFR BLD: 9 % (ref 5–13)
NEUTS SEG # BLD: 4.14 K/UL (ref 1.8–8)
NEUTS SEG NFR BLD: 61.8 % (ref 32–75)
NRBC # BLD: 0 K/UL (ref 0–0.01)
NRBC BLD-RTO: 0 PER 100 WBC
PLATELET # BLD AUTO: 142 K/UL (ref 150–400)
PMV BLD AUTO: 11.1 FL (ref 8.9–12.9)
POTASSIUM SERPL-SCNC: 4.1 MMOL/L (ref 3.5–5.1)
PROT SERPL-MCNC: 6.8 G/DL (ref 6.4–8.2)
PSA SERPL-MCNC: 0.7 NG/ML (ref 0.01–4)
RBC # BLD AUTO: 4.46 M/UL (ref 4.1–5.7)
SODIUM SERPL-SCNC: 133 MMOL/L (ref 136–145)
T4 FREE SERPL-MCNC: 1.5 NG/DL (ref 0.8–1.5)
TRIGL SERPL-MCNC: 74 MG/DL
TSH SERPL DL<=0.05 MIU/L-ACNC: 1.41 UIU/ML (ref 0.36–3.74)
VLDLC SERPL CALC-MCNC: 14.8 MG/DL
WBC # BLD AUTO: 6.7 K/UL (ref 4.1–11.1)

## 2025-03-11 PROCEDURE — 99213 OFFICE O/P EST LOW 20 MIN: CPT | Performed by: INTERNAL MEDICINE

## 2025-03-11 PROCEDURE — G0296 VISIT TO DETERM LDCT ELIG: HCPCS | Performed by: INTERNAL MEDICINE

## 2025-03-11 SDOH — ECONOMIC STABILITY: FOOD INSECURITY: WITHIN THE PAST 12 MONTHS, YOU WORRIED THAT YOUR FOOD WOULD RUN OUT BEFORE YOU GOT MONEY TO BUY MORE.: NEVER TRUE

## 2025-03-11 SDOH — ECONOMIC STABILITY: FOOD INSECURITY: WITHIN THE PAST 12 MONTHS, THE FOOD YOU BOUGHT JUST DIDN'T LAST AND YOU DIDN'T HAVE MONEY TO GET MORE.: NEVER TRUE

## 2025-03-11 ASSESSMENT — PATIENT HEALTH QUESTIONNAIRE - PHQ9
SUM OF ALL RESPONSES TO PHQ QUESTIONS 1-9: 0
2. FEELING DOWN, DEPRESSED OR HOPELESS: NOT AT ALL
SUM OF ALL RESPONSES TO PHQ QUESTIONS 1-9: 0
1. LITTLE INTEREST OR PLEASURE IN DOING THINGS: NOT AT ALL

## 2025-03-11 ASSESSMENT — LIFESTYLE VARIABLES
HOW MANY STANDARD DRINKS CONTAINING ALCOHOL DO YOU HAVE ON A TYPICAL DAY: PATIENT DOES NOT DRINK
HOW OFTEN DO YOU HAVE A DRINK CONTAINING ALCOHOL: NEVER

## 2025-03-11 NOTE — PROGRESS NOTES
\"Have you been to the ER, urgent care clinic since your last visit?  Hospitalized since your last visit?\"    NO    “Have you seen or consulted any other health care providers outside our system since your last visit?”    NO      “Have you had a colorectal cancer screening such as a colonoscopy/FIT/Cologuard?    NO    No colonoscopy on file  No cologuard on file  Date of last FIT: 3/18/2023   No flexible sigmoidoscopy on file          
DO

## 2025-03-11 NOTE — PATIENT INSTRUCTIONS

## 2025-03-12 ENCOUNTER — RESULTS FOLLOW-UP (OUTPATIENT)
Age: 70
End: 2025-03-12

## 2025-03-19 LAB — HEMOCCULT STL QL IA: NEGATIVE

## 2025-03-20 ENCOUNTER — HOSPITAL ENCOUNTER (OUTPATIENT)
Facility: HOSPITAL | Age: 70
Discharge: HOME | End: 2025-03-23
Attending: INTERNAL MEDICINE
Payer: MEDICARE

## 2025-03-20 DIAGNOSIS — Z87.891 PERSONAL HISTORY OF TOBACCO USE: ICD-10-CM

## 2025-03-20 PROCEDURE — 71271 CT THORAX LUNG CANCER SCR C-: CPT

## 2025-03-23 ENCOUNTER — RESULTS FOLLOW-UP (OUTPATIENT)
Facility: HOSPITAL | Age: 70
End: 2025-03-23

## 2025-04-18 DIAGNOSIS — G47.00 INSOMNIA, UNSPECIFIED TYPE: ICD-10-CM

## 2025-04-18 DIAGNOSIS — G43.009 MIGRAINE WITHOUT AURA AND WITHOUT STATUS MIGRAINOSUS, NOT INTRACTABLE: ICD-10-CM

## 2025-04-18 NOTE — TELEPHONE ENCOUNTER
BREZTRI AEROSPHERE 160-9-4.8 MCG/ACT AERO      tamsulosin (FLOMAX) 0.4 MG capsule     eszopiclone (LUNESTA) 3 MG TABS     levothyroxine (SYNTHROID) 100 MCG tablet      Ubrogepant (UBRELVY) 50 MG TABS    90 day mail order refills [facility]

## 2025-04-21 RX ORDER — LEVOTHYROXINE SODIUM 100 UG/1
100 TABLET ORAL
Qty: 90 TABLET | Refills: 3 | Status: SHIPPED | OUTPATIENT
Start: 2025-04-21

## 2025-04-21 RX ORDER — TAMSULOSIN HYDROCHLORIDE 0.4 MG/1
0.8 CAPSULE ORAL DAILY
Qty: 180 CAPSULE | Refills: 3 | Status: SHIPPED | OUTPATIENT
Start: 2025-04-21

## 2025-04-21 RX ORDER — BUDESONIDE, GLYCOPYRROLATE, AND FORMOTEROL FUMARATE 160; 9; 4.8 UG/1; UG/1; UG/1
AEROSOL, METERED RESPIRATORY (INHALATION)
Qty: 30.21 G | Refills: 3 | Status: SHIPPED | OUTPATIENT
Start: 2025-04-21

## 2025-04-21 RX ORDER — UBROGEPANT 50 MG/1
50 TABLET ORAL DAILY PRN
Qty: 16 TABLET | Refills: 5 | Status: SHIPPED | OUTPATIENT
Start: 2025-04-21

## 2025-04-21 RX ORDER — ESZOPICLONE 3 MG/1
3 TABLET, FILM COATED ORAL NIGHTLY
Qty: 90 TABLET | Refills: 3 | Status: SHIPPED | OUTPATIENT
Start: 2025-04-21 | End: 2026-04-16

## 2025-04-21 NOTE — TELEPHONE ENCOUNTER
PCP: Eduardo Rabago III, DO    Last appt: 3/11/2025   Future Appointments   Date Time Provider Department Center   9/11/2025 10:00 AM Eduardo Rabago III, DO [facility]       Requested Prescriptions     Pending Prescriptions Disp Refills    Budeson-Glycopyrrol-Formoterol (BREZTRI AEROSPHERE) 160-9-4.8 MCG/ACT AERO 3 each 3     Sig: USE 2 INHALATIONS BY MOUTH TWICE DAILY    tamsulosin (FLOMAX) 0.4 MG capsule 180 capsule 3     Sig: Take 1 capsule by mouth daily    eszopiclone (LUNESTA) 3 MG TABS 90 tablet 3     Sig: Take 1 tablet by mouth nightly for 360 days. Max Daily Amount: 3 mg    levothyroxine (SYNTHROID) 100 MCG tablet 90 tablet 3     Sig: Take 1 tablet by mouth every morning (before breakfast)    Ubrogepant (UBRELVY) 50 MG TABS 16 tablet 1     Sig: Take 50 mg by mouth daily as needed (migraine)

## 2025-07-14 DIAGNOSIS — G47.00 INSOMNIA, UNSPECIFIED TYPE: ICD-10-CM

## 2025-07-15 DIAGNOSIS — G47.00 INSOMNIA, UNSPECIFIED TYPE: ICD-10-CM

## 2025-07-15 RX ORDER — ESZOPICLONE 3 MG/1
3 TABLET, FILM COATED ORAL NIGHTLY
Qty: 90 TABLET | Refills: 3 | Status: SHIPPED | OUTPATIENT
Start: 2025-07-15 | End: 2026-07-10

## 2025-07-25 ENCOUNTER — OFFICE VISIT (OUTPATIENT)
Age: 70
End: 2025-07-25
Payer: MEDICARE

## 2025-07-25 VITALS
OXYGEN SATURATION: 96 % | DIASTOLIC BLOOD PRESSURE: 60 MMHG | HEART RATE: 73 BPM | HEIGHT: 67 IN | SYSTOLIC BLOOD PRESSURE: 98 MMHG | RESPIRATION RATE: 16 BRPM | TEMPERATURE: 98.1 F | BODY MASS INDEX: 25.33 KG/M2 | WEIGHT: 161.38 LBS

## 2025-07-25 DIAGNOSIS — J44.9 CHRONIC OBSTRUCTIVE PULMONARY DISEASE, UNSPECIFIED COPD TYPE (HCC): Primary | ICD-10-CM

## 2025-07-25 DIAGNOSIS — I25.10 CORONARY ARTERY DISEASE INVOLVING NATIVE CORONARY ARTERY OF NATIVE HEART WITHOUT ANGINA PECTORIS: ICD-10-CM

## 2025-07-25 DIAGNOSIS — I10 ESSENTIAL HYPERTENSION: ICD-10-CM

## 2025-07-25 DIAGNOSIS — E03.9 ACQUIRED HYPOTHYROIDISM: ICD-10-CM

## 2025-07-25 DIAGNOSIS — Z87.891 PERSONAL HISTORY OF TOBACCO USE: ICD-10-CM

## 2025-07-25 DIAGNOSIS — G43.009 MIGRAINE WITHOUT AURA AND WITHOUT STATUS MIGRAINOSUS, NOT INTRACTABLE: ICD-10-CM

## 2025-07-25 PROCEDURE — 99214 OFFICE O/P EST MOD 30 MIN: CPT | Performed by: INTERNAL MEDICINE

## 2025-07-25 PROCEDURE — 3074F SYST BP LT 130 MM HG: CPT | Performed by: INTERNAL MEDICINE

## 2025-07-25 PROCEDURE — 3078F DIAST BP <80 MM HG: CPT | Performed by: INTERNAL MEDICINE

## 2025-07-25 PROCEDURE — 1123F ACP DISCUSS/DSCN MKR DOCD: CPT | Performed by: INTERNAL MEDICINE

## 2025-07-25 PROCEDURE — 1159F MED LIST DOCD IN RCRD: CPT | Performed by: INTERNAL MEDICINE

## 2025-07-25 PROCEDURE — 1160F RVW MEDS BY RX/DR IN RCRD: CPT | Performed by: INTERNAL MEDICINE

## 2025-07-25 RX ORDER — FLUTICASONE PROPIONATE AND SALMETEROL 250; 50 UG/1; UG/1
1 POWDER RESPIRATORY (INHALATION) EVERY 12 HOURS
Qty: 60 EACH | Refills: 5 | Status: SHIPPED | OUTPATIENT
Start: 2025-07-25

## 2025-07-25 RX ORDER — UMECLIDINIUM 62.5 UG/1
1 AEROSOL, POWDER ORAL DAILY
Qty: 30 EACH | Refills: 5
Start: 2025-07-25

## 2025-07-25 RX ORDER — ACETAMINOPHEN 500 MG
500 TABLET ORAL EVERY 6 HOURS PRN
COMMUNITY

## 2025-07-25 NOTE — PATIENT INSTRUCTIONS
Let me know in a few weeks how you are doing with the new discus inhaler, and I will send it to mail order then.

## 2025-08-11 ENCOUNTER — TELEPHONE (OUTPATIENT)
Age: 70
End: 2025-08-11

## 2025-08-14 ENCOUNTER — OFFICE VISIT (OUTPATIENT)
Age: 70
End: 2025-08-14
Payer: MEDICARE

## 2025-08-14 VITALS
SYSTOLIC BLOOD PRESSURE: 118 MMHG | WEIGHT: 159 LBS | OXYGEN SATURATION: 96 % | BODY MASS INDEX: 24.96 KG/M2 | RESPIRATION RATE: 18 BRPM | DIASTOLIC BLOOD PRESSURE: 78 MMHG | HEART RATE: 77 BPM | TEMPERATURE: 97.8 F | HEIGHT: 67 IN

## 2025-08-14 DIAGNOSIS — J44.1 COPD EXACERBATION (HCC): Primary | ICD-10-CM

## 2025-08-14 DIAGNOSIS — E03.9 ACQUIRED HYPOTHYROIDISM: ICD-10-CM

## 2025-08-14 DIAGNOSIS — I10 ESSENTIAL HYPERTENSION: ICD-10-CM

## 2025-08-14 DIAGNOSIS — G43.009 MIGRAINE WITHOUT AURA AND WITHOUT STATUS MIGRAINOSUS, NOT INTRACTABLE: ICD-10-CM

## 2025-08-14 DIAGNOSIS — I25.10 CORONARY ARTERY DISEASE INVOLVING NATIVE CORONARY ARTERY OF NATIVE HEART WITHOUT ANGINA PECTORIS: ICD-10-CM

## 2025-08-14 PROCEDURE — 1160F RVW MEDS BY RX/DR IN RCRD: CPT | Performed by: INTERNAL MEDICINE

## 2025-08-14 PROCEDURE — 3074F SYST BP LT 130 MM HG: CPT | Performed by: INTERNAL MEDICINE

## 2025-08-14 PROCEDURE — 3078F DIAST BP <80 MM HG: CPT | Performed by: INTERNAL MEDICINE

## 2025-08-14 PROCEDURE — 1123F ACP DISCUSS/DSCN MKR DOCD: CPT | Performed by: INTERNAL MEDICINE

## 2025-08-14 PROCEDURE — 99214 OFFICE O/P EST MOD 30 MIN: CPT | Performed by: INTERNAL MEDICINE

## 2025-08-14 PROCEDURE — 1159F MED LIST DOCD IN RCRD: CPT | Performed by: INTERNAL MEDICINE

## 2025-08-14 RX ORDER — BUDESONIDE, GLYCOPYRROLATE, AND FORMOTEROL FUMARATE 160; 9; 4.8 UG/1; UG/1; UG/1
AEROSOL, METERED RESPIRATORY (INHALATION)
Qty: 3 EACH | Refills: 3
Start: 2025-08-14

## 2025-08-14 RX ORDER — PREDNISONE 20 MG/1
TABLET ORAL
Qty: 18 TABLET | Refills: 0 | Status: SHIPPED | OUTPATIENT
Start: 2025-08-14